# Patient Record
Sex: MALE | Race: WHITE | NOT HISPANIC OR LATINO | Employment: FULL TIME | URBAN - METROPOLITAN AREA
[De-identification: names, ages, dates, MRNs, and addresses within clinical notes are randomized per-mention and may not be internally consistent; named-entity substitution may affect disease eponyms.]

---

## 2021-05-13 ENCOUNTER — OFFICE VISIT (OUTPATIENT)
Dept: SURGERY | Facility: CLINIC | Age: 51
End: 2021-05-13

## 2021-05-13 VITALS — HEIGHT: 75 IN | BODY MASS INDEX: 39.17 KG/M2 | WEIGHT: 315 LBS

## 2021-05-13 DIAGNOSIS — N50.89 SCROTAL MASS: Primary | ICD-10-CM

## 2021-05-13 PROBLEM — I51.7 LEFT VENTRICULAR HYPERTROPHY: Status: ACTIVE | Noted: 2021-05-13

## 2021-05-13 PROBLEM — F41.1 GENERALIZED ANXIETY DISORDER: Status: ACTIVE | Noted: 2021-05-13

## 2021-05-13 PROBLEM — F32.0 MILD MAJOR DEPRESSION (HCC): Status: ACTIVE | Noted: 2021-05-13

## 2021-05-13 PROBLEM — R04.0 ANTERIOR EPISTAXIS: Status: ACTIVE | Noted: 2021-05-13

## 2021-05-13 PROBLEM — K21.9 GASTROESOPHAGEAL REFLUX DISEASE WITHOUT ESOPHAGITIS: Status: ACTIVE | Noted: 2021-05-13

## 2021-05-13 PROBLEM — I51.7 CARDIOMEGALY: Status: ACTIVE | Noted: 2021-05-13

## 2021-05-13 PROBLEM — R05.3 CHRONIC COUGH: Status: ACTIVE | Noted: 2021-05-13

## 2021-05-13 PROBLEM — J30.2 SEASONAL ALLERGIC RHINITIS: Status: ACTIVE | Noted: 2018-05-29

## 2021-05-13 PROBLEM — R73.01 IMPAIRED FASTING GLUCOSE: Status: ACTIVE | Noted: 2018-05-29

## 2021-05-13 PROCEDURE — 99204 OFFICE O/P NEW MOD 45 MIN: CPT | Performed by: PHYSICIAN ASSISTANT

## 2021-05-13 RX ORDER — OMEPRAZOLE 40 MG/1
40 CAPSULE, DELAYED RELEASE ORAL NIGHTLY
COMMUNITY

## 2021-05-13 RX ORDER — TELMISARTAN 40 MG/1
40 TABLET ORAL NIGHTLY
COMMUNITY

## 2021-05-13 NOTE — PROGRESS NOTES
"CC:    Right inguinal hernia    HPI:    This is a 50-year-old gentleman presenting to the office today at the request of Dr. Bigg Hassan for consultation.  He states that for going on approximately 20 years now he has had an enlarging area in his scrotum on the right side.  Over the last several months however this is begun to enlarge significantly extending from his right groin down into his right scrotum.  He occasionally has pain in his right groin and testicle.  He states that the bulge never reduces or goes away.  He does state that previously he believes he had an ultrasound potentially as far back as 2000 for which he was informed was showing a hydrocele.  More recently he states he underwent [facility] at [facility] and was told that he had a right inguinal hernia.  He denies having difficulty with urination, difficulty with bowel habits, abdominal distention, nausea, vomiting or any other complaints.    PMH:    Seasonal allergies  Cardiomegaly  Essential hypertension  Left ventricular hypertrophy  Anterior epistaxis  Impaired fasting glucose  GERD  Generalized anxiety disorder  Mild major depression  Chronic cough    PSH:     Colonoscopy 2020  Left toe amputation 2003    SH:  Does not smoke, does not consume alcohol    FMH:  No colorectal cancer in his family history    ALLERGIES:   Penicillin    MEDICATIONS:  Reviewed and reconciled in Epic.    ROS:    All other systems reviewed and negative other than presenting complaints.    PE:  Vitals: Weight 343 height 75\" BMI 43 Discussed with patient increased perioperative risks associated with obesity including increased risks of DVT, infection, seromas, poor wound healing and hernias (with abdominal surgery).  Constitutional: Well-nourished, well-developed male in no acute distress  HEENT: Normocephalic, atraumatic, sclera anicteric, normal conjunctiva  Pulmonary: Clear to auscultation bilaterally, normal respiratory effort, no wheezes, rales or rhonchi " noted  Cardiac: Regular rate and rhythm, no murmurs, gallops or rubs noted  Abdomen: Rotund, soft, nontender, nondistended, normal bowel sounds are present  : Large right-sided scrotal mass, unable to reduce, unable to ascertain whether or not this is hernia or hydrocele on exam  Skin: Warm, dry, intact, no rashes noted  Musculoskeletal: Normal gait, no joint asymmetries noted  Psych: Alert and orient x3, normal affect, normal judgment    CLINICAL SUMMARY (A/P):    This is a 50-year-old gentleman presenting with a longstanding history of a large right-sided scrotal mass.  Dr. Mueller and I examined him and were unable to ascertain whether or not this was indeed a large right inguinal hernia or hydrocele.  We called [facility] to obtain the ultrasound report.  We did decide to proceed with a CT scan of the pelvis to fully ascertain whether this was indeed an inguinal hernia.  In the event that it does return as an inguinal hernia I did discuss with him surgical intervention with that being an inguinal hernia repair.  I discussed with him the possibility of attempting this laparoscopically versus open and he understands the nature of the procedure and the risks including but not limited to bleeding, infection, use of mesh, and recurrence.  All questions were answered at the time of this visit and they were willing to proceed with all recommendations.  Once we have the results of the CT scan we will contact him to discuss the next step.      Linwood Mcgill PA-C

## 2021-06-07 ENCOUNTER — APPOINTMENT (OUTPATIENT)
Dept: CT IMAGING | Facility: HOSPITAL | Age: 51
End: 2021-06-07

## 2021-07-16 ENCOUNTER — TELEPHONE (OUTPATIENT)
Dept: SURGERY | Facility: CLINIC | Age: 51
End: 2021-07-16

## 2021-07-16 ENCOUNTER — PREP FOR SURGERY (OUTPATIENT)
Dept: OTHER | Facility: HOSPITAL | Age: 51
End: 2021-07-16

## 2021-07-16 DIAGNOSIS — K40.90 RIGHT INGUINAL HERNIA: Primary | ICD-10-CM

## 2021-07-16 RX ORDER — CEFAZOLIN SODIUM 2 G/100ML
2 INJECTION, SOLUTION INTRAVENOUS ONCE
Status: CANCELLED | OUTPATIENT
Start: 2022-01-25 | End: 2021-07-16

## 2021-07-16 NOTE — TELEPHONE ENCOUNTER
----- Message from Anton Mueller MD sent at 7/16/2021  8:54 AM EDT -----  Please let him know that his CT shows large right inguinal hernia. I have placed orders for open right inguinal hernia repair to be done in main OR

## 2022-01-04 PROBLEM — K40.90 RIGHT INGUINAL HERNIA: Status: ACTIVE | Noted: 2022-01-04

## 2022-01-17 ENCOUNTER — PRE-ADMISSION TESTING (OUTPATIENT)
Dept: PREADMISSION TESTING | Facility: HOSPITAL | Age: 52
End: 2022-01-17

## 2022-01-17 ENCOUNTER — TELEPHONE (OUTPATIENT)
Dept: SURGERY | Facility: CLINIC | Age: 52
End: 2022-01-17

## 2022-01-17 VITALS
SYSTOLIC BLOOD PRESSURE: 137 MMHG | WEIGHT: 315 LBS | HEIGHT: 75 IN | RESPIRATION RATE: 18 BRPM | HEART RATE: 97 BPM | DIASTOLIC BLOOD PRESSURE: 83 MMHG | TEMPERATURE: 97.9 F | BODY MASS INDEX: 39.17 KG/M2 | OXYGEN SATURATION: 95 %

## 2022-01-17 LAB
ANION GAP SERPL CALCULATED.3IONS-SCNC: 11.8 MMOL/L (ref 5–15)
BUN SERPL-MCNC: 12 MG/DL (ref 6–20)
BUN/CREAT SERPL: 17.1 (ref 7–25)
CALCIUM SPEC-SCNC: 9.3 MG/DL (ref 8.6–10.5)
CHLORIDE SERPL-SCNC: 98 MMOL/L (ref 98–107)
CO2 SERPL-SCNC: 26.2 MMOL/L (ref 22–29)
CREAT SERPL-MCNC: 0.7 MG/DL (ref 0.76–1.27)
DEPRECATED RDW RBC AUTO: 39.8 FL (ref 37–54)
ERYTHROCYTE [DISTWIDTH] IN BLOOD BY AUTOMATED COUNT: 13.4 % (ref 12.3–15.4)
GFR SERPL CREATININE-BSD FRML MDRD: 118 ML/MIN/1.73
GLUCOSE SERPL-MCNC: 343 MG/DL (ref 65–99)
HCT VFR BLD AUTO: 45.1 % (ref 37.5–51)
HGB BLD-MCNC: 14.8 G/DL (ref 13–17.7)
MCH RBC QN AUTO: 27.3 PG (ref 26.6–33)
MCHC RBC AUTO-ENTMCNC: 32.8 G/DL (ref 31.5–35.7)
MCV RBC AUTO: 83.1 FL (ref 79–97)
PLATELET # BLD AUTO: 307 10*3/MM3 (ref 140–450)
PMV BLD AUTO: 10.2 FL (ref 6–12)
POTASSIUM SERPL-SCNC: 4.3 MMOL/L (ref 3.5–5.2)
QT INTERVAL: 340 MS
RBC # BLD AUTO: 5.43 10*6/MM3 (ref 4.14–5.8)
SODIUM SERPL-SCNC: 136 MMOL/L (ref 136–145)
WBC NRBC COR # BLD: 8.44 10*3/MM3 (ref 3.4–10.8)

## 2022-01-17 PROCEDURE — 36415 COLL VENOUS BLD VENIPUNCTURE: CPT

## 2022-01-17 PROCEDURE — 80048 BASIC METABOLIC PNL TOTAL CA: CPT

## 2022-01-17 PROCEDURE — 93005 ELECTROCARDIOGRAM TRACING: CPT

## 2022-01-17 PROCEDURE — 85027 COMPLETE CBC AUTOMATED: CPT

## 2022-01-17 PROCEDURE — 93010 ELECTROCARDIOGRAM REPORT: CPT | Performed by: INTERNAL MEDICINE

## 2022-01-17 RX ORDER — CHLORHEXIDINE GLUCONATE 500 MG/1
1 CLOTH TOPICAL TAKE AS DIRECTED
COMMUNITY
End: 2022-02-15 | Stop reason: HOSPADM

## 2022-01-17 NOTE — DISCHARGE INSTRUCTIONS
ARRIVE DAY OF SURGERY AT  7:30 AM          Take the following medications the morning of surgery:  NONE      If you are on prescription narcotic pain medication to control your pain you may also take that medication the morning of surgery.    General Instructions:  • Do not eat solid food after midnight the night before surgery.  • You may drink clear liquids day of surgery but must stop at least one hour before your hospital arrival time.  • It is beneficial for you to have a clear drink that contains carbohydrates the day of surgery.  We suggest a 12 to 20 ounce bottle of Gatorade or Powerade for non-diabetic patients or a 12 to 20 ounce bottle of G2 or Powerade Zero for diabetic patients. (Pediatric patients, are not advised to drink a 12 to 20 ounce carbohydrate drink)    Clear liquids are liquids you can see through.  Nothing red in color.     Plain water                               Sports drinks  Sodas                                   Gelatin (Jell-O)  Fruit juices without pulp such as white grape juice and apple juice  Popsicles that contain no fruit or yogurt  Tea or coffee (no cream or milk added)  Gatorade / Powerade  G2 / Powerade Zero    • Infants may have breast milk up to four hours before surgery.  • Infants drinking formula may drink formula up to six hours before surgery.   • Patients who avoid smoking, chewing tobacco and alcohol for 4 weeks prior to surgery have a reduced risk of post-operative complications.  Quit smoking as many days before surgery as you can.  • Do not smoke, use chewing tobacco or drink alcohol the day of surgery.   • If applicable bring your C-PAP/ BI-PAP machine.  • Bring any papers given to you in the doctor’s office.  • Wear clean comfortable clothes.  • Do not wear contact lenses, false eyelashes or make-up.  Bring a case for your glasses.   • Bring crutches or walker if applicable.  • Remove all piercings.  Leave jewelry and any other valuables at home.  • Hair  extensions with metal clips must be removed prior to surgery.  • The Pre-Admission Testing nurse will instruct you to bring medications if unable to obtain an accurate list in Pre-Admission Testing.            Preventing a Surgical Site Infection:  • For 2 to 3 days before surgery, avoid shaving with a razor because the razor can irritate skin and make it easier to develop an infection.    • Any areas of open skin can increase the risk of a post-operative wound infection by allowing bacteria to enter and travel throughout the body.  Notify your surgeon if you have any skin wounds / rashes even if it is not near the expected surgical site.  The area will need assessed to determine if surgery should be delayed until it is healed.  • The night prior to surgery shower using a fresh bar of anti-bacterial soap (such as Dial) and clean washcloth.  Sleep in a clean bed with clean clothing.  Do not allow pets to sleep with you.  • Shower on the morning of surgery using a fresh bar of anti-bacterial soap (such as Dial) and clean washcloth.  Dry with a clean towel and dress in clean clothing.  • Ask your surgeon if you will be receiving antibiotics prior to surgery.  • Make sure you, your family, and all healthcare providers clean their hands with soap and water or an alcohol based hand  before caring for you or your wound.    Day of surgery:  Your arrival time is approximately two hours before your scheduled surgery time.  Upon arrival, a Pre-op nurse and Anesthesiologist will review your health history, obtain vital signs, and answer questions you may have.  The only belongings needed at this time will be a list of your home medications and if applicable your C-PAP/BI-PAP machine.  A Pre-op nurse will start an IV and you may receive medication in preparation for surgery, including something to help you relax.     Please be aware that surgery does come with discomfort.  We want to make every effort to control your  discomfort so please discuss any uncontrolled symptoms with your nurse.   Your doctor will most likely have prescribed pain medications.      If you are going home after surgery you will receive individualized written care instructions before being discharged.  A responsible adult must drive you to and from the hospital on the day of your surgery and stay with you for 24 hours.  Discharge prescriptions can be filled by the hospital pharmacy during regular pharmacy hours.  If you are having surgery late in the day/evening your prescription may be e-prescribed to your pharmacy.  Please verify your pharmacy hours or chose a 24 hour pharmacy to avoid not having access to your prescription because your pharmacy has closed for the day.    If you are staying overnight following surgery, you will be transported to your hospital room following the recovery period.  [facility] has all private rooms.    If you have any questions please call Pre-Admission Testing at [phone].  Deductibles and co-payments are collected on the day of service. Please be prepared to pay the required co-pay, deductible or deposit on the day of service as defined by your plan.    Patient Education for Self-Quarantine Process    • Following your COVID testing, we strongly recommend that you wear a mask when you are with other people and practice social distancing.   • Limit your activities to only required outings.  • Wash your hands with soap and water frequently for at least 20 seconds.   • Avoid touching your eyes, nose and mouth with unwashed hands.  • Do not share anything - utensils, drinking glasses, food from the same bowl.   • Sanitize household surfaces daily. Include all high touch areas (door handles, light switches, phones, countertops, etc.)    Call your surgeon immediately if you experience any of the following symptoms:  • Sore Throat  • Shortness of Breath or difficulty breathing  • Cough  • Chills  • Body soreness  or muscle pain  • Headache  • Fever  • New loss of taste or smell  • Do not arrive for your surgery ill.  Your procedure will need to be rescheduled to another time.  You will need to call your physician before the day of surgery to avoid any unnecessary exposure to hospital staff as well as other patients.    CHLORHEXIDINE CLOTH INSTRUCTIONS  The morning of surgery follow these instructions using the Chlorhexidine cloths you've been given.  These steps reduce bacteria on the body.  Do not use the cloths near your eyes, ears mouth, genitalia or on open wounds.  Throw the cloths away after use but do not try to flush them down a toilet.      • Open and remove one cloth at a time from the package.    • Leave the cloth unfolded and begin the bathing.  • Massage the skin with the cloths using gentle pressure to remove bacteria.  Do not scrub harshly.   • Follow the steps below with one 2% CHG cloth per area (6 total cloths).  • One cloth for neck, shoulders and chest.  • One cloth for both arms, hands, fingers and underarms (do underarms last).  • One cloth for the abdomen followed by groin.  • One cloth for right leg and foot including between the toes.  • One cloth for left leg and foot including between the toes.  • The last cloth is to be used for the back of the neck, back and buttocks.    Allow the CHG to air dry 3 minutes on the skin which will give it time to work and decrease the chance of irritation.  The skin may feel sticky until it is dry.  Do not rinse with water or any other liquid or you will lose the beneficial effects of the CHG.  If mild skin irritation occurs, do rinse the skin to remove the CHG.  Report this to the nurse at time of admission.  Do not apply lotions, creams, ointments, deodorants or perfumes after using the clothes. Dress in clean clothes before coming to the hospital.

## 2022-01-17 NOTE — TELEPHONE ENCOUNTER
"Called pt to inform him of his blood sugar and what steps we needed to take to make sure he was okay for surgery. He was upset.\" He does not know when he can get into doctor and if we are going be like this he wants to cancel the whole thing\" I did confirm with him twice that he wanted to cancel his surgery.  "

## 2022-02-12 ENCOUNTER — LAB (OUTPATIENT)
Dept: LAB | Facility: HOSPITAL | Age: 52
End: 2022-02-12

## 2022-02-12 LAB — SARS-COV-2 ORF1AB RESP QL NAA+PROBE: NOT DETECTED

## 2022-02-12 PROCEDURE — C9803 HOPD COVID-19 SPEC COLLECT: HCPCS

## 2022-02-12 PROCEDURE — U0004 COV-19 TEST NON-CDC HGH THRU: HCPCS

## 2022-02-15 ENCOUNTER — ANESTHESIA (OUTPATIENT)
Dept: PERIOP | Facility: HOSPITAL | Age: 52
End: 2022-02-15

## 2022-02-15 ENCOUNTER — HOSPITAL ENCOUNTER (OUTPATIENT)
Facility: HOSPITAL | Age: 52
Setting detail: HOSPITAL OUTPATIENT SURGERY
Discharge: HOME | End: 2022-02-15
Attending: SURGERY | Admitting: SURGERY

## 2022-02-15 ENCOUNTER — ANESTHESIA EVENT (OUTPATIENT)
Dept: PERIOP | Facility: HOSPITAL | Age: 52
End: 2022-02-15

## 2022-02-15 VITALS
HEIGHT: 75 IN | OXYGEN SATURATION: 95 % | DIASTOLIC BLOOD PRESSURE: 77 MMHG | RESPIRATION RATE: 16 BRPM | SYSTOLIC BLOOD PRESSURE: 112 MMHG | HEART RATE: 89 BPM | WEIGHT: 315 LBS | TEMPERATURE: 98.1 F | BODY MASS INDEX: 39.17 KG/M2

## 2022-02-15 DIAGNOSIS — K40.90 RIGHT INGUINAL HERNIA: ICD-10-CM

## 2022-02-15 LAB
GLUCOSE BLDC GLUCOMTR-MCNC: 154 MG/DL (ref 70–130)
GLUCOSE BLDC GLUCOMTR-MCNC: 160 MG/DL (ref 70–130)

## 2022-02-15 PROCEDURE — 25010000002 ONDANSETRON PER 1 MG: Performed by: NURSE ANESTHETIST, CERTIFIED REGISTERED

## 2022-02-15 PROCEDURE — 25010000002 SUCCINYLCHOLINE PER 20 MG: Performed by: NURSE ANESTHETIST, CERTIFIED REGISTERED

## 2022-02-15 PROCEDURE — C1781 MESH (IMPLANTABLE): HCPCS | Performed by: SURGERY

## 2022-02-15 PROCEDURE — 49507 PRP I/HERN INIT BLOCK >5 YR: CPT | Performed by: SURGERY

## 2022-02-15 PROCEDURE — C1889 IMPLANT/INSERT DEVICE, NOC: HCPCS | Performed by: SURGERY

## 2022-02-15 PROCEDURE — 25010000002 HYDROMORPHONE PER 4 MG: Performed by: NURSE ANESTHETIST, CERTIFIED REGISTERED

## 2022-02-15 PROCEDURE — S0260 H&P FOR SURGERY: HCPCS | Performed by: SURGERY

## 2022-02-15 PROCEDURE — 25010000002 CEFAZOLIN PER 500 MG: Performed by: SURGERY

## 2022-02-15 PROCEDURE — 25010000002 NEOSTIGMINE 5 MG/10ML SOLUTION: Performed by: NURSE ANESTHETIST, CERTIFIED REGISTERED

## 2022-02-15 PROCEDURE — 25010000002 FENTANYL CITRATE (PF) 50 MCG/ML SOLUTION: Performed by: NURSE ANESTHETIST, CERTIFIED REGISTERED

## 2022-02-15 PROCEDURE — 25010000002 DEXAMETHASONE PER 1 MG: Performed by: NURSE ANESTHETIST, CERTIFIED REGISTERED

## 2022-02-15 PROCEDURE — 82962 GLUCOSE BLOOD TEST: CPT

## 2022-02-15 PROCEDURE — 49507 PRP I/HERN INIT BLOCK >5 YR: CPT | Performed by: SPECIALIST/TECHNOLOGIST, OTHER

## 2022-02-15 PROCEDURE — 25010000002 PROPOFOL 10 MG/ML EMULSION: Performed by: NURSE ANESTHETIST, CERTIFIED REGISTERED

## 2022-02-15 PROCEDURE — 25010000002 KETOROLAC TROMETHAMINE PER 15 MG: Performed by: NURSE ANESTHETIST, CERTIFIED REGISTERED

## 2022-02-15 DEVICE — BARD MESH
Type: IMPLANTABLE DEVICE | Site: GROIN | Status: FUNCTIONAL
Brand: BARD MESH

## 2022-02-15 DEVICE — HORIZON TI ML 6 CLIPS/CART
Type: IMPLANTABLE DEVICE | Site: GROIN | Status: FUNCTIONAL
Brand: WECK

## 2022-02-15 RX ORDER — CEFAZOLIN SODIUM 2 G/100ML
2 INJECTION, SOLUTION INTRAVENOUS ONCE
Status: DISCONTINUED | OUTPATIENT
Start: 2022-02-15 | End: 2022-02-15

## 2022-02-15 RX ORDER — HYDROCODONE BITARTRATE AND ACETAMINOPHEN 7.5; 325 MG/1; MG/1
1 TABLET ORAL ONCE AS NEEDED
Status: COMPLETED | OUTPATIENT
Start: 2022-02-15 | End: 2022-02-15

## 2022-02-15 RX ORDER — SODIUM CHLORIDE 0.9 % (FLUSH) 0.9 %
10 SYRINGE (ML) INJECTION AS NEEDED
Status: DISCONTINUED | OUTPATIENT
Start: 2022-02-15 | End: 2022-02-15 | Stop reason: HOSPADM

## 2022-02-15 RX ORDER — KETOROLAC TROMETHAMINE 30 MG/ML
INJECTION, SOLUTION INTRAMUSCULAR; INTRAVENOUS AS NEEDED
Status: DISCONTINUED | OUTPATIENT
Start: 2022-02-15 | End: 2022-02-15 | Stop reason: SURG

## 2022-02-15 RX ORDER — DIPHENHYDRAMINE HCL 25 MG
25 CAPSULE ORAL
Status: DISCONTINUED | OUTPATIENT
Start: 2022-02-15 | End: 2022-02-15 | Stop reason: HOSPADM

## 2022-02-15 RX ORDER — FENTANYL CITRATE 50 UG/ML
INJECTION, SOLUTION INTRAMUSCULAR; INTRAVENOUS AS NEEDED
Status: DISCONTINUED | OUTPATIENT
Start: 2022-02-15 | End: 2022-02-15 | Stop reason: SURG

## 2022-02-15 RX ORDER — OXYCODONE AND ACETAMINOPHEN 7.5; 325 MG/1; MG/1
1 TABLET ORAL EVERY 4 HOURS PRN
Status: DISCONTINUED | OUTPATIENT
Start: 2022-02-15 | End: 2022-02-15 | Stop reason: HOSPADM

## 2022-02-15 RX ORDER — BUPIVACAINE HYDROCHLORIDE AND EPINEPHRINE 5; 5 MG/ML; UG/ML
INJECTION, SOLUTION EPIDURAL; INTRACAUDAL; PERINEURAL AS NEEDED
Status: DISCONTINUED | OUTPATIENT
Start: 2022-02-15 | End: 2022-02-15 | Stop reason: HOSPADM

## 2022-02-15 RX ORDER — SODIUM CHLORIDE 0.9 % (FLUSH) 0.9 %
10 SYRINGE (ML) INJECTION EVERY 12 HOURS SCHEDULED
Status: DISCONTINUED | OUTPATIENT
Start: 2022-02-15 | End: 2022-02-15 | Stop reason: HOSPADM

## 2022-02-15 RX ORDER — FLUMAZENIL 0.1 MG/ML
0.2 INJECTION INTRAVENOUS AS NEEDED
Status: DISCONTINUED | OUTPATIENT
Start: 2022-02-15 | End: 2022-02-15 | Stop reason: HOSPADM

## 2022-02-15 RX ORDER — NEOSTIGMINE METHYLSULFATE 0.5 MG/ML
INJECTION, SOLUTION INTRAVENOUS AS NEEDED
Status: DISCONTINUED | OUTPATIENT
Start: 2022-02-15 | End: 2022-02-15 | Stop reason: SURG

## 2022-02-15 RX ORDER — LIDOCAINE HYDROCHLORIDE 20 MG/ML
INJECTION, SOLUTION INFILTRATION; PERINEURAL AS NEEDED
Status: DISCONTINUED | OUTPATIENT
Start: 2022-02-15 | End: 2022-02-15 | Stop reason: SURG

## 2022-02-15 RX ORDER — PROMETHAZINE HYDROCHLORIDE 25 MG/1
25 SUPPOSITORY RECTAL ONCE AS NEEDED
Status: DISCONTINUED | OUTPATIENT
Start: 2022-02-15 | End: 2022-02-15 | Stop reason: HOSPADM

## 2022-02-15 RX ORDER — HYDROCODONE BITARTRATE AND ACETAMINOPHEN 5; 325 MG/1; MG/1
2 TABLET ORAL EVERY 4 HOURS PRN
Qty: 24 TABLET | Refills: 0 | Status: SHIPPED | OUTPATIENT
Start: 2022-02-15 | End: 2022-03-07 | Stop reason: ALTCHOICE

## 2022-02-15 RX ORDER — SODIUM CHLORIDE, SODIUM LACTATE, POTASSIUM CHLORIDE, CALCIUM CHLORIDE 600; 310; 30; 20 MG/100ML; MG/100ML; MG/100ML; MG/100ML
9 INJECTION, SOLUTION INTRAVENOUS CONTINUOUS
Status: DISCONTINUED | OUTPATIENT
Start: 2022-02-15 | End: 2022-02-15 | Stop reason: HOSPADM

## 2022-02-15 RX ORDER — ONDANSETRON 2 MG/ML
INJECTION INTRAMUSCULAR; INTRAVENOUS AS NEEDED
Status: DISCONTINUED | OUTPATIENT
Start: 2022-02-15 | End: 2022-02-15 | Stop reason: SURG

## 2022-02-15 RX ORDER — MAGNESIUM HYDROXIDE 1200 MG/15ML
LIQUID ORAL AS NEEDED
Status: DISCONTINUED | OUTPATIENT
Start: 2022-02-15 | End: 2022-02-15 | Stop reason: HOSPADM

## 2022-02-15 RX ORDER — LABETALOL HYDROCHLORIDE 5 MG/ML
5 INJECTION, SOLUTION INTRAVENOUS
Status: DISCONTINUED | OUTPATIENT
Start: 2022-02-15 | End: 2022-02-15 | Stop reason: HOSPADM

## 2022-02-15 RX ORDER — FAMOTIDINE 10 MG/ML
20 INJECTION, SOLUTION INTRAVENOUS ONCE
Status: COMPLETED | OUTPATIENT
Start: 2022-02-15 | End: 2022-02-15

## 2022-02-15 RX ORDER — DEXAMETHASONE SODIUM PHOSPHATE 10 MG/ML
INJECTION INTRAMUSCULAR; INTRAVENOUS AS NEEDED
Status: DISCONTINUED | OUTPATIENT
Start: 2022-02-15 | End: 2022-02-15 | Stop reason: SURG

## 2022-02-15 RX ORDER — FENTANYL CITRATE 50 UG/ML
50 INJECTION, SOLUTION INTRAMUSCULAR; INTRAVENOUS
Status: DISCONTINUED | OUTPATIENT
Start: 2022-02-15 | End: 2022-02-15 | Stop reason: HOSPADM

## 2022-02-15 RX ORDER — PIOGLITAZONEHYDROCHLORIDE 45 MG/1
45 TABLET ORAL DAILY
Status: ON HOLD | COMMUNITY
End: 2022-02-15

## 2022-02-15 RX ORDER — DAPAGLIFLOZIN 10 MG/1
10 TABLET, FILM COATED ORAL DAILY
COMMUNITY

## 2022-02-15 RX ORDER — ROCURONIUM BROMIDE 10 MG/ML
INJECTION, SOLUTION INTRAVENOUS AS NEEDED
Status: DISCONTINUED | OUTPATIENT
Start: 2022-02-15 | End: 2022-02-15 | Stop reason: SURG

## 2022-02-15 RX ORDER — DIPHENHYDRAMINE HYDROCHLORIDE 50 MG/ML
12.5 INJECTION INTRAMUSCULAR; INTRAVENOUS
Status: DISCONTINUED | OUTPATIENT
Start: 2022-02-15 | End: 2022-02-15 | Stop reason: HOSPADM

## 2022-02-15 RX ORDER — SUCCINYLCHOLINE CHLORIDE 20 MG/ML
INJECTION INTRAMUSCULAR; INTRAVENOUS AS NEEDED
Status: DISCONTINUED | OUTPATIENT
Start: 2022-02-15 | End: 2022-02-15 | Stop reason: SURG

## 2022-02-15 RX ORDER — EPHEDRINE SULFATE 50 MG/ML
5 INJECTION, SOLUTION INTRAVENOUS ONCE AS NEEDED
Status: DISCONTINUED | OUTPATIENT
Start: 2022-02-15 | End: 2022-02-15 | Stop reason: HOSPADM

## 2022-02-15 RX ORDER — MIDAZOLAM HYDROCHLORIDE 1 MG/ML
1 INJECTION INTRAMUSCULAR; INTRAVENOUS
Status: DISCONTINUED | OUTPATIENT
Start: 2022-02-15 | End: 2022-02-15 | Stop reason: HOSPADM

## 2022-02-15 RX ORDER — ONDANSETRON 4 MG/1
4 TABLET, FILM COATED ORAL EVERY 6 HOURS PRN
Qty: 10 TABLET | Refills: 1 | Status: SHIPPED | OUTPATIENT
Start: 2022-02-15 | End: 2022-03-07

## 2022-02-15 RX ORDER — PROMETHAZINE HYDROCHLORIDE 25 MG/1
25 TABLET ORAL ONCE AS NEEDED
Status: DISCONTINUED | OUTPATIENT
Start: 2022-02-15 | End: 2022-02-15 | Stop reason: HOSPADM

## 2022-02-15 RX ORDER — ONDANSETRON 2 MG/ML
4 INJECTION INTRAMUSCULAR; INTRAVENOUS ONCE AS NEEDED
Status: COMPLETED | OUTPATIENT
Start: 2022-02-15 | End: 2022-02-15

## 2022-02-15 RX ORDER — GLYCOPYRROLATE 0.2 MG/ML
INJECTION INTRAMUSCULAR; INTRAVENOUS AS NEEDED
Status: DISCONTINUED | OUTPATIENT
Start: 2022-02-15 | End: 2022-02-15 | Stop reason: SURG

## 2022-02-15 RX ORDER — IBUPROFEN 600 MG/1
600 TABLET ORAL ONCE AS NEEDED
Status: DISCONTINUED | OUTPATIENT
Start: 2022-02-15 | End: 2022-02-15 | Stop reason: HOSPADM

## 2022-02-15 RX ORDER — HYDRALAZINE HYDROCHLORIDE 20 MG/ML
5 INJECTION INTRAMUSCULAR; INTRAVENOUS
Status: DISCONTINUED | OUTPATIENT
Start: 2022-02-15 | End: 2022-02-15 | Stop reason: HOSPADM

## 2022-02-15 RX ORDER — PROPOFOL 10 MG/ML
VIAL (ML) INTRAVENOUS AS NEEDED
Status: DISCONTINUED | OUTPATIENT
Start: 2022-02-15 | End: 2022-02-15 | Stop reason: SURG

## 2022-02-15 RX ORDER — HYDROMORPHONE HYDROCHLORIDE 1 MG/ML
0.5 INJECTION, SOLUTION INTRAMUSCULAR; INTRAVENOUS; SUBCUTANEOUS
Status: DISCONTINUED | OUTPATIENT
Start: 2022-02-15 | End: 2022-02-15 | Stop reason: HOSPADM

## 2022-02-15 RX ORDER — NALOXONE HCL 0.4 MG/ML
0.2 VIAL (ML) INJECTION AS NEEDED
Status: DISCONTINUED | OUTPATIENT
Start: 2022-02-15 | End: 2022-02-15 | Stop reason: HOSPADM

## 2022-02-15 RX ORDER — ORAL SEMAGLUTIDE 3 MG/1
3 TABLET ORAL DAILY
COMMUNITY

## 2022-02-15 RX ORDER — CEFAZOLIN SODIUM IN 0.9 % NACL 3 G/100 ML
3 INTRAVENOUS SOLUTION, PIGGYBACK (ML) INTRAVENOUS ONCE
Status: COMPLETED | OUTPATIENT
Start: 2022-02-15 | End: 2022-02-15

## 2022-02-15 RX ADMIN — FENTANYL CITRATE 100 MCG: 0.05 INJECTION, SOLUTION INTRAMUSCULAR; INTRAVENOUS at 09:54

## 2022-02-15 RX ADMIN — SODIUM CHLORIDE, POTASSIUM CHLORIDE, SODIUM LACTATE AND CALCIUM CHLORIDE: 600; 310; 30; 20 INJECTION, SOLUTION INTRAVENOUS at 11:47

## 2022-02-15 RX ADMIN — SUCCINYLCHOLINE CHLORIDE 200 MG: 20 INJECTION, SOLUTION INTRAMUSCULAR; INTRAVENOUS; PARENTERAL at 09:58

## 2022-02-15 RX ADMIN — FENTANYL CITRATE 50 MCG: 50 INJECTION, SOLUTION INTRAMUSCULAR; INTRAVENOUS at 12:10

## 2022-02-15 RX ADMIN — LIDOCAINE HYDROCHLORIDE 100 MG: 20 INJECTION, SOLUTION INFILTRATION; PERINEURAL at 09:57

## 2022-02-15 RX ADMIN — HYDROCODONE BITARTRATE AND ACETAMINOPHEN 1 TABLET: 7.5; 325 TABLET ORAL at 13:01

## 2022-02-15 RX ADMIN — DEXAMETHASONE SODIUM PHOSPHATE 8 MG: 10 INJECTION INTRAMUSCULAR; INTRAVENOUS at 10:04

## 2022-02-15 RX ADMIN — ONDANSETRON 4 MG: 2 INJECTION INTRAMUSCULAR; INTRAVENOUS at 13:01

## 2022-02-15 RX ADMIN — ROCURONIUM BROMIDE 30 MG: 50 INJECTION INTRAVENOUS at 10:04

## 2022-02-15 RX ADMIN — ROCURONIUM BROMIDE 10 MG: 50 INJECTION INTRAVENOUS at 10:37

## 2022-02-15 RX ADMIN — ROCURONIUM BROMIDE 10 MG: 50 INJECTION INTRAVENOUS at 09:57

## 2022-02-15 RX ADMIN — HYDROMORPHONE HYDROCHLORIDE 0.5 MG: 1 INJECTION, SOLUTION INTRAMUSCULAR; INTRAVENOUS; SUBCUTANEOUS at 11:56

## 2022-02-15 RX ADMIN — SODIUM CHLORIDE, POTASSIUM CHLORIDE, SODIUM LACTATE AND CALCIUM CHLORIDE 9 ML/HR: 600; 310; 30; 20 INJECTION, SOLUTION INTRAVENOUS at 08:25

## 2022-02-15 RX ADMIN — CEFAZOLIN SODIUM 3 G: 10 INJECTION, POWDER, FOR SOLUTION INTRAVENOUS at 09:38

## 2022-02-15 RX ADMIN — NEOSTIGMINE METHYLSULFATE 3 MG: 0.5 INJECTION INTRAVENOUS at 11:30

## 2022-02-15 RX ADMIN — FENTANYL CITRATE 50 MCG: 50 INJECTION, SOLUTION INTRAMUSCULAR; INTRAVENOUS at 11:50

## 2022-02-15 RX ADMIN — KETOROLAC TROMETHAMINE 30 MG: 30 INJECTION, SOLUTION INTRAMUSCULAR at 11:30

## 2022-02-15 RX ADMIN — PROPOFOL 250 MG: 10 INJECTION, EMULSION INTRAVENOUS at 09:57

## 2022-02-15 RX ADMIN — ONDANSETRON 4 MG: 2 INJECTION INTRAMUSCULAR; INTRAVENOUS at 11:30

## 2022-02-15 RX ADMIN — FAMOTIDINE 20 MG: 10 INJECTION INTRAVENOUS at 08:37

## 2022-02-15 RX ADMIN — GLYCOPYRROLATE 0.6 MG: 0.2 INJECTION INTRAMUSCULAR; INTRAVENOUS at 11:30

## 2022-02-15 NOTE — ANESTHESIA PREPROCEDURE EVALUATION
Anesthesia Evaluation     history of anesthetic complications (post op headache):               Airway   Mallampati: III  TM distance: >3 FB  Neck ROM: full  Large neck circumference  Dental - normal exam     Pulmonary    (+) sleep apnea (at risk , sleep study scheduled),   (-) shortness of breath, not a smoker  Cardiovascular     (+) hypertension,   (-) dysrhythmias, angina, hyperlipidemia    ROS comment: Cardiomegaly, LVH    Neuro/Psych  GI/Hepatic/Renal/Endo    (+) morbid obesity, GERD,  diabetes mellitus,   (-) liver disease, no renal disease    Musculoskeletal     Abdominal    Substance History      OB/GYN          Other                        Anesthesia Plan    ASA 3     general     intravenous induction     Anesthetic plan, all risks, benefits, and alternatives have been provided, discussed and informed consent has been obtained with: patient.        CODE STATUS:

## 2022-02-15 NOTE — ANESTHESIA PROCEDURE NOTES
Airway  Urgency: elective    Date/Time: 2/15/2022 9:59 AM  Airway not difficult    General Information and Staff    Patient location during procedure: OR  Anesthesiologist: Dawson Carlin MD  CRNA: Bam Marquez CRNA    Indications and Patient Condition  Indications for airway management: airway protection    Preoxygenated: yes  MILS maintained throughout  Mask difficulty assessment: 2 - vent by mask + OA or adjuvant +/- NMBA    Final Airway Details  Final airway type: endotracheal airway      Successful airway: ETT  Cuffed: yes   Successful intubation technique: direct laryngoscopy  Facilitating devices/methods: intubating stylet  Endotracheal tube insertion site: oral  Blade: Beltrán  Blade size: 2  ETT size (mm): 8.0  Cormack-Lehane Classification: grade I - full view of glottis  Placement verified by: chest auscultation and capnometry   Cuff volume (mL): 8  Measured from: lips  ETT/EBT  to lips (cm): 22  Number of attempts at approach: 1  Assessment: lips, teeth, and gum same as pre-op and atraumatic intubation

## 2022-02-15 NOTE — ANESTHESIA POSTPROCEDURE EVALUATION
Patient: Guevara Barreto    Procedure Summary     Date: 02/15/22 Room / Location: [facility] OR  / [facility] MAIN OR    Anesthesia Start: 0950 Anesthesia Stop: 1147    Procedure: OPEN RIGHT INGUINAL HERNIA REPAIR WITH MESH (Right Abdomen) Diagnosis:       Right inguinal hernia      (Right inguinal hernia [K40.90])    Surgeons: Anton Mueller MD Provider: Dawson Carlin MD    Anesthesia Type: general ASA Status: 3          Anesthesia Type: general    Vitals  Vitals Value Taken Time   /80 02/15/22 1156   Temp 36.7 °C (98.1 °F) 02/15/22 1144   Pulse 91 02/15/22 1207   Resp 16 02/15/22 1155   SpO2 98 % 02/15/22 1207   Vitals shown include unvalidated device data.        Post Anesthesia Care and Evaluation    Patient location during evaluation: PACU  Patient participation: complete - patient participated  Level of consciousness: awake and alert  Pain management: adequate  Airway patency: patent  Anesthetic complications: No anesthetic complications    Cardiovascular status: acceptable  Respiratory status: acceptable  Hydration status: acceptable    Comments: --------------------            02/15/22               1155     --------------------   BP:       134/80     Pulse:      80       Resp:       16       Temp:                SpO2:      96%      --------------------

## 2022-02-15 NOTE — H&P
HPI: 51-year-old gentleman with longstanding right inguinal hernia.    PMH, PSH, MEDS AND ALLERGIES reviewed and reconciled with EPIC    PHYSICAL EXAM:  -  Constitutional:  no acute distress  -  Respiratory:  normal inspiratory effort  -  Cardiovascular: regular rate  -  Gastrointestinal: Soft, Large right inguinal hernia    ASSESSMENT/PLAN:    Open right inguinal hernia repair.  He understands nature the procedure and the risks.    Anton Mueller M.D.

## 2022-02-15 NOTE — DISCHARGE INSTRUCTIONS
1.  May shower day after tomorrow over incision and drain, may remove dressings day after tomorrow  2.  My office will call him with follow-up appointment for this coming Monday.        Dr. Anton Mueller  [address]  [place], [place] [address] [phone]    Discharge Instructions for Hernia Surgery    1. Go home, rest and take it easy today; however, you should get up and move about several times today to reduce the risk of developing a clot in your legs.      2. You may experience some dizziness or memory loss from the anesthesia.  This may last for the next 24 hours.  Someone should plan on staying with you for the first 24 hours for your safety.    3. Do not make any important legal decisions or sign any legal papers for the next 24 hours.      4. Eat and drink lightly today.  Start off with liquids, jello, soup, crackers or other bland foods at first. You may advance your diet tomorrow as tolerated as long as you do not experience any nausea or vomiting.     5. If skin glue (Dermabond) was used, your incisions are protected and covered.  The invisible glue will dissolve on its own as your incision heals. If dressings were used, you may remove your outer dressings in 3 days.  The white tapes called steri-strips should stay in place.  They will fall off on their own in 1-2 weeks.  Do not worry if they come off sooner.      6. If dressings were used, you may notice some bleeding/drainage on your outer dressings. A little bloody drainage is normal. If the bleeding/drainage is such that the bandage cannot absorb it, remove the dressing, apply clean gauze and apply firm pressure for a full 15 minutes.  If the bleeding continues, please call me.    7. You may shower tomorrow allowing water to run over the incisions; however, do not scrub the incisions.  No tub baths until your incisions are completely healed.      8. No lifting > 20 lbs. until you are seen at your follow-up visit.         9. You have  received a prescription for a narcotic pain medicine, as you will have some pain following surgery.   You will not be totally pain free, but your pain medicine should make the pain tolerable.  Please take your pain medicine as prescribed and always take your pills with food to prevent nausea. If you are having severe pain that cannot be controlled by the pain medicine, please contact me.      10. You have also received a prescription for an anti-nausea medicine.  Please take this as prescribed for any nausea or vomiting.  Nausea could be a result of the anesthesia or a result of the narcotic pain medicine.  If you experience severe nausea and vomiting that cannot be controlled by the nausea medicine, please call me.      11. If you had a laparoscopic surgery, it is not unusual to experience pain/discomfort in your shoulders or under your ribs after surgery.  It is from the gas used during the laparoscopic procedure and usually lasts 1-3 days.  The prescription pain medicine is used to treat the surgical pain and does not typically alleviate this “gassy” pain.     12. No driving for 24 hours and for as long as you are taking your prescription pain medicine.    13. You will need to call the office at [phone] to schedule a follow-up appointment in 6-10 days.     14. Remember to contact me for any of the following:    • Fever > 101 degrees  • Severe pain that cannot be controlled by taking your pain pills  • Severe nausea or vomiting that cannot be controlled by taking your nausea pills  • Significant bleeding of your incisions  • Drainage that has a bad smell or is yellow or green in appearance  • Any other questions or concerns      Additional Instruction for Inguinal Hernia Patients Only    1. If you did not urinate at the hospital after your surgery or if you feel the need to urinate and cannot, this will necessitate a return to the Emergency Room for placement of a urinary catheter.  You should also notify me as  well.  As a rule, you should be able to empty your bladder within 4-6 hours after discharge from the hospital.      2. You may notice some scrotal bruising and/or swelling. A scrotal support or briefs as well as ice packs may be used to alleviate discomfort.

## 2022-02-15 NOTE — OP NOTE
PREOPERATIVE DIAGNOSIS:  Large incarcerated right inguinal hernia    POSTOPERATIVE DIAGNOSIS (FINDINGS):  Large incarcerated indirect right inguinal hernia    PROCEDURE:  Open incarcerated right inguinal hernia repair with polypropylene mesh (Per technique)    SURGEON:  Anton Mueller MD    ASSISTANT:  Emely Bhardwaj, was responsible for performing the following activities: suction, irrigation, suturing, closing, retraction, and placing dressing, and their skilled assistance was necessary for the success of this case.    ANESTHESIA:  General    EBL:  Minimal    SPECIMEN(S):  none    DESCRIPTION:  In supine position under general anesthetic prepped and draped usual sterile manner.  Half percent Marcaine with epinephrine infiltrated locally.  Transverse incision made and carried to a large incarcerated hernia sac.  The external oblique was  to the level of the internal ring and was divided laterally.  The hernia sac and cord structures were dissected circumferentially and Penrose drain was placed.  Cremasteric muscles were  from the underlying cord structures and ultimately dissected back past the external ring and divided with the electrocautery.  Spermatic cord was skeletonized and the vas deferens and vessels were  from the very large indirect sac that extended down to the scrotum.  Dissection was carried proximally and ultimately the hernia sac was completely reduced with some difficulty.  The transversalis fascia was closed over the sac with interrupted 0 Ethibond.  A generous medial relaxing incision was made and the conjoined aponeurosis was sutured to the transversalis fascia immediately adjacent to the shelving edge of Poupart's ligament with interrupted 0 Ethibond.  Appropriate defect was left for the cord.  6 x 6 polypropylene mesh was then placed on the inguinal floor and secured medially close to the pubic tubercle with interrupted 0 Ethibond and then secured to the  shelving edge of Poupart's ligament with interrupted 0 Ethibond.  A slit was cut for the cord structures and the inferior aspect of the superior portion of the mesh was secured to the shelving edge of Poupart's ligament lateral to the cord structures to finish the neointernal ring.  Medially and superiorly the mesh was secured to the fascia with interrupted 0 Ethibond.  Good flat apposition of the mesh was achieved and good hemostasis was noted.  The portion of the external oblique which was intact preoperatively was sutured with interrupted 3-0 Vicryl.  19 Maori Syed drain was placed well into the scrotum and then anterior to the cord structures.  Skin was closed with interrupted 3-0 nylon vertical mattress sutures.  Sterile dressing applied.  Tolerated well.    Anton Mueller M.D.

## 2022-02-18 ENCOUNTER — TELEPHONE (OUTPATIENT)
Dept: SURGERY | Facility: CLINIC | Age: 52
End: 2022-02-18

## 2022-02-18 NOTE — TELEPHONE ENCOUNTER
S/P OPEN RIGHT INGUINAL HERNIA REPAIR WITH MESH on 2/15/2022    Mr. Barreto called this morning stating that he had been constipated took a stool softener, had a bowel movement and then later last night had a coughing spell and now his right testicle is swollen (see below picture). He confirmed that he has not had an increase in pain and his INDER drain is still in tact. The fluid has changed color from normal color red to light red/pinkish now. He has only emptied his drain once yesterday evening and it was 20 cc's.         Does patient just need to elevate and apply ice?

## 2022-02-18 NOTE — TELEPHONE ENCOUNTER
I spoke with Mr. Barreto and informed him that per Dr. Mueller his swelling does not look like a problem. He has advised that the patient can apply ice to help minimize this and I also advised him to elevate his scrotum by rolling up a hand towel and placing it beneath them. Patient voiced understanding and his post-op appointment was scheduled for Monday 2/21 @ 1:00 pm

## 2022-02-21 ENCOUNTER — OFFICE VISIT (OUTPATIENT)
Dept: SURGERY | Facility: CLINIC | Age: 52
End: 2022-02-21

## 2022-02-21 VITALS — WEIGHT: 315 LBS | HEIGHT: 75 IN | BODY MASS INDEX: 39.17 KG/M2

## 2022-02-21 DIAGNOSIS — Z09 FOLLOW UP: Primary | ICD-10-CM

## 2022-02-21 PROCEDURE — 99024 POSTOP FOLLOW-UP VISIT: CPT | Performed by: SURGERY

## 2022-02-21 NOTE — PROGRESS NOTES
Postoperative visit    Open incarcerated right inguinal hernia repair with polypropylene mesh (Per technique) 2/15/2022    Office visit: His incision is healing well and his drain is putting out minimal serosanguineous fluid and was removed.  He has significant but not unexpected right hemiscrotal swelling.  I recommended that he start wearing better scrotal support.  He is to return in 1 week to remove sutures.

## 2022-02-24 ENCOUNTER — TELEPHONE (OUTPATIENT)
Dept: SURGERY | Facility: CLINIC | Age: 52
End: 2022-02-24

## 2022-02-24 NOTE — TELEPHONE ENCOUNTER
Caller: DARIA WAGNER    Relationship to patient: SELF     Best call back number: [phone]    Patient is needing: PT WAS RETURNING [facility] CALL. PLEASE CALL HIM BACK.

## 2022-02-24 NOTE — TELEPHONE ENCOUNTER
I spoke with Mr. Barreto and he states that he is weak and yesterday evening after eating a salad he began to have vomiting and running a fever from 100.0 to around 101.9  He confirms that he still has drainage where his drain was removed on Monday 2/21. He states the color is reddish and has an odor.  He took an at home Covid test and it was negative.  The first thing he has had in the last 24 hr is an apple and that seems to be staying down. He states he is drinking plenty of fluids also.  I advised the patient that he can take Zofran that was prescribed post-operatively and to drink either Gatorade or Powered as he may be dehydrated and the electrolytes will help with supplementation. I also advised him to continue on a bland diet such as dry toast, beef broth, chicken broth, crackers etc to see if this will stay down also. I advised the patient that if his symptoms worsened to either proceed to the ER or call the office and the answering service will page the on call provider for advice.  Please advise.

## 2022-02-24 NOTE — TELEPHONE ENCOUNTER
ATTEMPTED TO WARM TRANSFER BUT UNABLE TO REACH THE PRACTICE.     Caller: DARIA WAGNER    Relationship to patient: SELF    Best call back number: [phone]    Patient is needing: PT HAD OPEN RIGHT INGUINAL HERNIA REPAIR WITH MESH (RIGHT TESTICLE) ON 2/15/22.  PT HAD DRAIN REMOVED AT POST-OP APPT ON 2/21/22    SINCE THEN, PUS HAS BEEN DRAINING FROM PT'S RIGHT TESTICLE,  PT STARTED RUNNING A FEVER (100-102), CURRENTLY 101.8 AND IS EXTREMELY WEAK.  PT VOMITED LAST NIGHT AFTER A SALAD, AND AGAIN THIS MORNING (JUST BILE).    PT WANTED TO KNOW IF THIS IS TYPICAL OR A PROBLEM.    SINCE I COULDN'T GET ANYONE ON THE LINE TO ADVISE THE PT, I TOLD THE PT I WOULD PUT IN A NOTE TO HAVE SOMEONE CALL THEM BACK, BUT ADVISED THEM TO GO TO UC OR ER TO GET CHECKED OUT SINCE I DIDN'T KNOW WHEN SOMEONE WOULD BE ABLE TO CALL BACK.

## 2022-02-25 RX ORDER — CEPHALEXIN 500 MG/1
500 CAPSULE ORAL 3 TIMES DAILY
Qty: 21 CAPSULE | Refills: 0 | Status: SHIPPED | OUTPATIENT
Start: 2022-02-25 | End: 2022-03-04

## 2022-02-25 NOTE — TELEPHONE ENCOUNTER
I spoke with the patient & informed him that Keflex has been e-scribed to the [facility] @ [address]. Patient voiced understanding and will call back if he develops any additional issues or this does not help.

## 2022-02-25 NOTE — TELEPHONE ENCOUNTER
"Patient called and left message stating, \"I won't be able to take Amoxicillin.\" Please let him know if there is anything else that Dr. Mueller could prescribe. "

## 2022-02-25 NOTE — TELEPHONE ENCOUNTER
I left Mr. Barreto a voice mail to call me back to find out if he is able to Augmentin since he has an allergy to Penicillins. I left him my direct number to return my call.

## 2022-02-28 ENCOUNTER — HOSPITAL ENCOUNTER (INPATIENT)
Facility: HOSPITAL | Age: 52
LOS: 2 days | Discharge: HOME | End: 2022-03-02
Attending: EMERGENCY MEDICINE | Admitting: SURGERY

## 2022-02-28 DIAGNOSIS — T81.31XA POSTOPERATIVE WOUND DEHISCENCE, INITIAL ENCOUNTER: Primary | ICD-10-CM

## 2022-02-28 DIAGNOSIS — Z87.19 H/O INGUINAL HERNIA: ICD-10-CM

## 2022-02-28 DIAGNOSIS — T81.31XS POSTOPERATIVE WOUND DEHISCENCE, SEQUELA: ICD-10-CM

## 2022-02-28 DIAGNOSIS — L03.314 CELLULITIS OF RIGHT GROIN: ICD-10-CM

## 2022-02-28 DIAGNOSIS — E87.6 HYPOKALEMIA: ICD-10-CM

## 2022-02-28 LAB
ALBUMIN SERPL-MCNC: 2.7 G/DL (ref 3.5–5.2)
ALBUMIN/GLOB SERPL: 0.7 G/DL
ALP SERPL-CCNC: 81 U/L (ref 39–117)
ALT SERPL W P-5'-P-CCNC: 27 U/L (ref 1–41)
ANION GAP SERPL CALCULATED.3IONS-SCNC: 17 MMOL/L (ref 5–15)
APTT PPP: 31.2 SECONDS (ref 22.7–35.4)
AST SERPL-CCNC: 22 U/L (ref 1–40)
BASOPHILS # BLD AUTO: 0.07 10*3/MM3 (ref 0–0.2)
BASOPHILS NFR BLD AUTO: 0.5 % (ref 0–1.5)
BILIRUB SERPL-MCNC: 0.5 MG/DL (ref 0–1.2)
BUN SERPL-MCNC: 12 MG/DL (ref 6–20)
BUN/CREAT SERPL: 23.1 (ref 7–25)
CALCIUM SPEC-SCNC: 8.7 MG/DL (ref 8.6–10.5)
CHLORIDE SERPL-SCNC: 93 MMOL/L (ref 98–107)
CO2 SERPL-SCNC: 23 MMOL/L (ref 22–29)
CREAT SERPL-MCNC: 0.52 MG/DL (ref 0.76–1.27)
D-LACTATE SERPL-SCNC: 1.2 MMOL/L (ref 0.5–2)
DEPRECATED RDW RBC AUTO: 38.3 FL (ref 37–54)
EGFRCR SERPLBLD CKD-EPI 2021: 121.3 ML/MIN/1.73
EOSINOPHIL # BLD AUTO: 0.03 10*3/MM3 (ref 0–0.4)
EOSINOPHIL NFR BLD AUTO: 0.2 % (ref 0.3–6.2)
ERYTHROCYTE [DISTWIDTH] IN BLOOD BY AUTOMATED COUNT: 13.3 % (ref 12.3–15.4)
GLOBULIN UR ELPH-MCNC: 3.9 GM/DL
GLUCOSE SERPL-MCNC: 146 MG/DL (ref 65–99)
HCT VFR BLD AUTO: 38.3 % (ref 37.5–51)
HGB BLD-MCNC: 12.5 G/DL (ref 13–17.7)
IMM GRANULOCYTES # BLD AUTO: 0.2 10*3/MM3 (ref 0–0.05)
IMM GRANULOCYTES NFR BLD AUTO: 1.4 % (ref 0–0.5)
INR PPP: 1.18 (ref 0.9–1.1)
LYMPHOCYTES # BLD AUTO: 1.19 10*3/MM3 (ref 0.7–3.1)
LYMPHOCYTES NFR BLD AUTO: 8.3 % (ref 19.6–45.3)
MCH RBC QN AUTO: 26.7 PG (ref 26.6–33)
MCHC RBC AUTO-ENTMCNC: 32.6 G/DL (ref 31.5–35.7)
MCV RBC AUTO: 81.7 FL (ref 79–97)
MONOCYTES # BLD AUTO: 1.68 10*3/MM3 (ref 0.1–0.9)
MONOCYTES NFR BLD AUTO: 11.7 % (ref 5–12)
NEUTROPHILS NFR BLD AUTO: 11.22 10*3/MM3 (ref 1.7–7)
NEUTROPHILS NFR BLD AUTO: 77.9 % (ref 42.7–76)
NRBC BLD AUTO-RTO: 0 /100 WBC (ref 0–0.2)
PLATELET # BLD AUTO: 428 10*3/MM3 (ref 140–450)
PMV BLD AUTO: 9 FL (ref 6–12)
POTASSIUM SERPL-SCNC: 3.1 MMOL/L (ref 3.5–5.2)
PROT SERPL-MCNC: 6.6 G/DL (ref 6–8.5)
PROTHROMBIN TIME: 14.9 SECONDS (ref 11.7–14.2)
RBC # BLD AUTO: 4.69 10*6/MM3 (ref 4.14–5.8)
SARS-COV-2 RNA PNL SPEC NAA+PROBE: NOT DETECTED
SODIUM SERPL-SCNC: 133 MMOL/L (ref 136–145)
WBC NRBC COR # BLD: 14.39 10*3/MM3 (ref 3.4–10.8)

## 2022-02-28 PROCEDURE — 87186 SC STD MICRODIL/AGAR DIL: CPT | Performed by: EMERGENCY MEDICINE

## 2022-02-28 PROCEDURE — 87040 BLOOD CULTURE FOR BACTERIA: CPT | Performed by: EMERGENCY MEDICINE

## 2022-02-28 PROCEDURE — 80053 COMPREHEN METABOLIC PANEL: CPT | Performed by: EMERGENCY MEDICINE

## 2022-02-28 PROCEDURE — 85025 COMPLETE CBC W/AUTO DIFF WBC: CPT | Performed by: EMERGENCY MEDICINE

## 2022-02-28 PROCEDURE — 25010000002 VANCOMYCIN 10 G RECONSTITUTED SOLUTION: Performed by: EMERGENCY MEDICINE

## 2022-02-28 PROCEDURE — 25010000002 CEFTRIAXONE PER 250 MG: Performed by: EMERGENCY MEDICINE

## 2022-02-28 PROCEDURE — 85730 THROMBOPLASTIN TIME PARTIAL: CPT | Performed by: EMERGENCY MEDICINE

## 2022-02-28 PROCEDURE — 87205 SMEAR GRAM STAIN: CPT | Performed by: EMERGENCY MEDICINE

## 2022-02-28 PROCEDURE — 87070 CULTURE OTHR SPECIMN AEROBIC: CPT | Performed by: EMERGENCY MEDICINE

## 2022-02-28 PROCEDURE — 85610 PROTHROMBIN TIME: CPT | Performed by: EMERGENCY MEDICINE

## 2022-02-28 PROCEDURE — 36415 COLL VENOUS BLD VENIPUNCTURE: CPT

## 2022-02-28 PROCEDURE — 99284 EMERGENCY DEPT VISIT MOD MDM: CPT

## 2022-02-28 PROCEDURE — 83605 ASSAY OF LACTIC ACID: CPT | Performed by: EMERGENCY MEDICINE

## 2022-02-28 PROCEDURE — 87635 SARS-COV-2 COVID-19 AMP PRB: CPT | Performed by: EMERGENCY MEDICINE

## 2022-02-28 RX ORDER — HYDROCODONE BITARTRATE AND ACETAMINOPHEN 5; 325 MG/1; MG/1
1 TABLET ORAL EVERY 6 HOURS PRN
Status: DISCONTINUED | OUTPATIENT
Start: 2022-02-28 | End: 2022-03-02 | Stop reason: HOSPADM

## 2022-02-28 RX ORDER — ONDANSETRON 4 MG/1
4 TABLET, FILM COATED ORAL EVERY 6 HOURS PRN
Status: DISCONTINUED | OUTPATIENT
Start: 2022-02-28 | End: 2022-03-02 | Stop reason: HOSPADM

## 2022-02-28 RX ORDER — CEFAZOLIN SODIUM 2 G/100ML
2 INJECTION, SOLUTION INTRAVENOUS EVERY 8 HOURS
Status: DISCONTINUED | OUTPATIENT
Start: 2022-03-01 | End: 2022-03-01

## 2022-02-28 RX ORDER — LOSARTAN POTASSIUM 50 MG/1
50 TABLET ORAL
Status: DISCONTINUED | OUTPATIENT
Start: 2022-03-01 | End: 2022-03-02 | Stop reason: HOSPADM

## 2022-02-28 RX ORDER — NICOTINE POLACRILEX 4 MG
15 LOZENGE BUCCAL
Status: DISCONTINUED | OUTPATIENT
Start: 2022-02-28 | End: 2022-03-02 | Stop reason: HOSPADM

## 2022-02-28 RX ORDER — SODIUM CHLORIDE 0.9 % (FLUSH) 0.9 %
10 SYRINGE (ML) INJECTION AS NEEDED
Status: DISCONTINUED | OUTPATIENT
Start: 2022-02-28 | End: 2022-03-02 | Stop reason: HOSPADM

## 2022-02-28 RX ORDER — DEXTROSE MONOHYDRATE 25 G/50ML
25 INJECTION, SOLUTION INTRAVENOUS
Status: DISCONTINUED | OUTPATIENT
Start: 2022-02-28 | End: 2022-03-02 | Stop reason: HOSPADM

## 2022-02-28 RX ORDER — HYDROCODONE BITARTRATE AND ACETAMINOPHEN 5; 325 MG/1; MG/1
2 TABLET ORAL EVERY 4 HOURS PRN
Status: DISCONTINUED | OUTPATIENT
Start: 2022-02-28 | End: 2022-03-02 | Stop reason: HOSPADM

## 2022-02-28 RX ORDER — CHOLECALCIFEROL (VITAMIN D3) 125 MCG
5 CAPSULE ORAL NIGHTLY PRN
Status: DISCONTINUED | OUTPATIENT
Start: 2022-02-28 | End: 2022-03-02 | Stop reason: HOSPADM

## 2022-02-28 RX ORDER — SODIUM CHLORIDE 0.9 % (FLUSH) 0.9 %
10 SYRINGE (ML) INJECTION EVERY 12 HOURS SCHEDULED
Status: DISCONTINUED | OUTPATIENT
Start: 2022-02-28 | End: 2022-03-02 | Stop reason: HOSPADM

## 2022-02-28 RX ORDER — SODIUM CHLORIDE 9 MG/ML
125 INJECTION, SOLUTION INTRAVENOUS CONTINUOUS
Status: DISCONTINUED | OUTPATIENT
Start: 2022-02-28 | End: 2022-03-01

## 2022-02-28 RX ORDER — HYDROMORPHONE HYDROCHLORIDE 1 MG/ML
0.5 INJECTION, SOLUTION INTRAMUSCULAR; INTRAVENOUS; SUBCUTANEOUS
Status: DISCONTINUED | OUTPATIENT
Start: 2022-02-28 | End: 2022-03-02 | Stop reason: HOSPADM

## 2022-02-28 RX ORDER — INSULIN LISPRO 100 [IU]/ML
0-7 INJECTION, SOLUTION INTRAVENOUS; SUBCUTANEOUS
Status: DISCONTINUED | OUTPATIENT
Start: 2022-03-01 | End: 2022-03-02 | Stop reason: HOSPADM

## 2022-02-28 RX ORDER — PANTOPRAZOLE SODIUM 40 MG/1
40 TABLET, DELAYED RELEASE ORAL EVERY MORNING
Status: DISCONTINUED | OUTPATIENT
Start: 2022-03-01 | End: 2022-03-02 | Stop reason: HOSPADM

## 2022-02-28 RX ORDER — DEXTROSE AND SODIUM CHLORIDE 5; .45 G/100ML; G/100ML
100 INJECTION, SOLUTION INTRAVENOUS CONTINUOUS
Status: DISCONTINUED | OUTPATIENT
Start: 2022-02-28 | End: 2022-03-01

## 2022-02-28 RX ORDER — POTASSIUM CHLORIDE 750 MG/1
40 TABLET, FILM COATED, EXTENDED RELEASE ORAL ONCE
Status: COMPLETED | OUTPATIENT
Start: 2022-02-28 | End: 2022-02-28

## 2022-02-28 RX ADMIN — SODIUM CHLORIDE 500 ML: 9 INJECTION, SOLUTION INTRAVENOUS at 20:02

## 2022-02-28 RX ADMIN — POTASSIUM CHLORIDE 40 MEQ: 750 TABLET, EXTENDED RELEASE ORAL at 20:00

## 2022-02-28 RX ADMIN — HYDROCODONE BITARTRATE AND ACETAMINOPHEN 1 TABLET: 5; 325 TABLET ORAL at 23:00

## 2022-02-28 RX ADMIN — SODIUM CHLORIDE 125 ML/HR: 9 INJECTION, SOLUTION INTRAVENOUS at 20:02

## 2022-02-28 RX ADMIN — Medication 5 MG: at 23:00

## 2022-02-28 RX ADMIN — DEXTROSE AND SODIUM CHLORIDE 75 ML/HR: 5; 450 INJECTION, SOLUTION INTRAVENOUS at 23:00

## 2022-02-28 RX ADMIN — VANCOMYCIN HYDROCHLORIDE 3000 MG: 10 INJECTION, POWDER, LYOPHILIZED, FOR SOLUTION INTRAVENOUS at 23:00

## 2022-02-28 RX ADMIN — CEFTRIAXONE 2 G: 2 INJECTION, POWDER, FOR SOLUTION INTRAMUSCULAR; INTRAVENOUS at 20:01

## 2022-02-28 RX ADMIN — Medication 10 ML: at 23:00

## 2022-03-01 ENCOUNTER — ANESTHESIA EVENT (OUTPATIENT)
Dept: PERIOP | Facility: HOSPITAL | Age: 52
End: 2022-03-01

## 2022-03-01 ENCOUNTER — ANESTHESIA (OUTPATIENT)
Dept: PERIOP | Facility: HOSPITAL | Age: 52
End: 2022-03-01

## 2022-03-01 LAB
BASOPHILS # BLD AUTO: 0.08 10*3/MM3 (ref 0–0.2)
BASOPHILS NFR BLD AUTO: 0.7 % (ref 0–1.5)
DEPRECATED RDW RBC AUTO: 39.9 FL (ref 37–54)
EOSINOPHIL # BLD AUTO: 0.07 10*3/MM3 (ref 0–0.4)
EOSINOPHIL NFR BLD AUTO: 0.6 % (ref 0.3–6.2)
ERYTHROCYTE [DISTWIDTH] IN BLOOD BY AUTOMATED COUNT: 13.4 % (ref 12.3–15.4)
GLUCOSE BLDC GLUCOMTR-MCNC: 123 MG/DL (ref 70–130)
GLUCOSE BLDC GLUCOMTR-MCNC: 123 MG/DL (ref 70–130)
GLUCOSE BLDC GLUCOMTR-MCNC: 174 MG/DL (ref 70–130)
GLUCOSE BLDC GLUCOMTR-MCNC: 253 MG/DL (ref 70–130)
HCT VFR BLD AUTO: 36 % (ref 37.5–51)
HGB BLD-MCNC: 11.9 G/DL (ref 13–17.7)
IMM GRANULOCYTES # BLD AUTO: 0.24 10*3/MM3 (ref 0–0.05)
IMM GRANULOCYTES NFR BLD AUTO: 2.2 % (ref 0–0.5)
LYMPHOCYTES # BLD AUTO: 2.09 10*3/MM3 (ref 0.7–3.1)
LYMPHOCYTES NFR BLD AUTO: 19 % (ref 19.6–45.3)
MCH RBC QN AUTO: 27.1 PG (ref 26.6–33)
MCHC RBC AUTO-ENTMCNC: 33.1 G/DL (ref 31.5–35.7)
MCV RBC AUTO: 82 FL (ref 79–97)
MONOCYTES # BLD AUTO: 1.33 10*3/MM3 (ref 0.1–0.9)
MONOCYTES NFR BLD AUTO: 12.1 % (ref 5–12)
NEUTROPHILS NFR BLD AUTO: 65.4 % (ref 42.7–76)
NEUTROPHILS NFR BLD AUTO: 7.21 10*3/MM3 (ref 1.7–7)
NRBC BLD AUTO-RTO: 0 /100 WBC (ref 0–0.2)
PLATELET # BLD AUTO: 411 10*3/MM3 (ref 140–450)
PMV BLD AUTO: 9.1 FL (ref 6–12)
RBC # BLD AUTO: 4.39 10*6/MM3 (ref 4.14–5.8)
WBC NRBC COR # BLD: 11.02 10*3/MM3 (ref 3.4–10.8)

## 2022-03-01 PROCEDURE — 25010000002 HYDROMORPHONE PER 4 MG: Performed by: STUDENT IN AN ORGANIZED HEALTH CARE EDUCATION/TRAINING PROGRAM

## 2022-03-01 PROCEDURE — 25010000002 FENTANYL CITRATE (PF) 50 MCG/ML SOLUTION: Performed by: ANESTHESIOLOGY

## 2022-03-01 PROCEDURE — 0 CEFAZOLIN IN DEXTROSE 2-4 GM/100ML-% SOLUTION: Performed by: STUDENT IN AN ORGANIZED HEALTH CARE EDUCATION/TRAINING PROGRAM

## 2022-03-01 PROCEDURE — 10140 I&D HMTMA SEROMA/FLUID COLLJ: CPT | Performed by: SURGERY

## 2022-03-01 PROCEDURE — 25010000002 SUCCINYLCHOLINE PER 20 MG: Performed by: NURSE ANESTHETIST, CERTIFIED REGISTERED

## 2022-03-01 PROCEDURE — 25010000002 ONDANSETRON PER 1 MG: Performed by: NURSE ANESTHETIST, CERTIFIED REGISTERED

## 2022-03-01 PROCEDURE — 0J9C3ZZ DRAINAGE OF PELVIC REGION SUBCUTANEOUS TISSUE AND FASCIA, PERCUTANEOUS APPROACH: ICD-10-PCS | Performed by: SURGERY

## 2022-03-01 PROCEDURE — 25010000002 FENTANYL CITRATE (PF) 50 MCG/ML SOLUTION: Performed by: NURSE ANESTHETIST, CERTIFIED REGISTERED

## 2022-03-01 PROCEDURE — 85025 COMPLETE CBC W/AUTO DIFF WBC: CPT | Performed by: STUDENT IN AN ORGANIZED HEALTH CARE EDUCATION/TRAINING PROGRAM

## 2022-03-01 PROCEDURE — 25010000002 PROPOFOL 10 MG/ML EMULSION: Performed by: NURSE ANESTHETIST, CERTIFIED REGISTERED

## 2022-03-01 PROCEDURE — 25010000002 CEFAZOLIN PER 500 MG: Performed by: SURGERY

## 2022-03-01 PROCEDURE — 25010000002 KETOROLAC TROMETHAMINE PER 15 MG: Performed by: NURSE ANESTHETIST, CERTIFIED REGISTERED

## 2022-03-01 PROCEDURE — 25010000002 VANCOMYCIN 10 G RECONSTITUTED SOLUTION: Performed by: SURGERY

## 2022-03-01 PROCEDURE — 25010000002 MAGNESIUM SULFATE PER 500 MG OF MAGNESIUM: Performed by: NURSE ANESTHETIST, CERTIFIED REGISTERED

## 2022-03-01 PROCEDURE — 82962 GLUCOSE BLOOD TEST: CPT

## 2022-03-01 PROCEDURE — 99024 POSTOP FOLLOW-UP VISIT: CPT | Performed by: SURGERY

## 2022-03-01 PROCEDURE — 87147 CULTURE TYPE IMMUNOLOGIC: CPT | Performed by: SURGERY

## 2022-03-01 PROCEDURE — 87070 CULTURE OTHR SPECIMN AEROBIC: CPT | Performed by: SURGERY

## 2022-03-01 PROCEDURE — 87205 SMEAR GRAM STAIN: CPT | Performed by: SURGERY

## 2022-03-01 PROCEDURE — 87075 CULTR BACTERIA EXCEPT BLOOD: CPT | Performed by: SURGERY

## 2022-03-01 PROCEDURE — 25010000002 DEXAMETHASONE PER 1 MG: Performed by: NURSE ANESTHETIST, CERTIFIED REGISTERED

## 2022-03-01 PROCEDURE — 25010000002 MIDAZOLAM PER 1 MG: Performed by: ANESTHESIOLOGY

## 2022-03-01 RX ORDER — NITROGLYCERIN 0.4 MG/1
0.4 TABLET SUBLINGUAL
Status: DISCONTINUED | OUTPATIENT
Start: 2022-03-01 | End: 2022-03-02 | Stop reason: HOSPADM

## 2022-03-01 RX ORDER — ONDANSETRON 2 MG/ML
INJECTION INTRAMUSCULAR; INTRAVENOUS AS NEEDED
Status: DISCONTINUED | OUTPATIENT
Start: 2022-03-01 | End: 2022-03-01 | Stop reason: SURG

## 2022-03-01 RX ORDER — ONDANSETRON 2 MG/ML
4 INJECTION INTRAMUSCULAR; INTRAVENOUS ONCE AS NEEDED
Status: DISCONTINUED | OUTPATIENT
Start: 2022-03-01 | End: 2022-03-01 | Stop reason: HOSPADM

## 2022-03-01 RX ORDER — FENTANYL CITRATE 50 UG/ML
50 INJECTION, SOLUTION INTRAMUSCULAR; INTRAVENOUS
Status: DISCONTINUED | OUTPATIENT
Start: 2022-03-01 | End: 2022-03-01 | Stop reason: HOSPADM

## 2022-03-01 RX ORDER — FAMOTIDINE 10 MG/ML
20 INJECTION, SOLUTION INTRAVENOUS ONCE
Status: COMPLETED | OUTPATIENT
Start: 2022-03-01 | End: 2022-03-01

## 2022-03-01 RX ORDER — LIDOCAINE HYDROCHLORIDE 10 MG/ML
0.5 INJECTION, SOLUTION EPIDURAL; INFILTRATION; INTRACAUDAL; PERINEURAL ONCE AS NEEDED
Status: DISCONTINUED | OUTPATIENT
Start: 2022-03-01 | End: 2022-03-01 | Stop reason: HOSPADM

## 2022-03-01 RX ORDER — PROMETHAZINE HYDROCHLORIDE 25 MG/1
25 TABLET ORAL ONCE AS NEEDED
Status: DISCONTINUED | OUTPATIENT
Start: 2022-03-01 | End: 2022-03-01 | Stop reason: HOSPADM

## 2022-03-01 RX ORDER — MAGNESIUM HYDROXIDE 1200 MG/15ML
LIQUID ORAL AS NEEDED
Status: DISCONTINUED | OUTPATIENT
Start: 2022-03-01 | End: 2022-03-01 | Stop reason: HOSPADM

## 2022-03-01 RX ORDER — HYDROMORPHONE HYDROCHLORIDE 1 MG/ML
0.5 INJECTION, SOLUTION INTRAMUSCULAR; INTRAVENOUS; SUBCUTANEOUS
Status: DISCONTINUED | OUTPATIENT
Start: 2022-03-01 | End: 2022-03-01 | Stop reason: HOSPADM

## 2022-03-01 RX ORDER — LABETALOL HYDROCHLORIDE 5 MG/ML
5 INJECTION, SOLUTION INTRAVENOUS
Status: DISCONTINUED | OUTPATIENT
Start: 2022-03-01 | End: 2022-03-01 | Stop reason: HOSPADM

## 2022-03-01 RX ORDER — SODIUM CHLORIDE 0.9 % (FLUSH) 0.9 %
3 SYRINGE (ML) INJECTION EVERY 12 HOURS SCHEDULED
Status: DISCONTINUED | OUTPATIENT
Start: 2022-03-01 | End: 2022-03-01 | Stop reason: HOSPADM

## 2022-03-01 RX ORDER — HYDROCODONE BITARTRATE AND ACETAMINOPHEN 7.5; 325 MG/1; MG/1
1 TABLET ORAL ONCE AS NEEDED
Status: DISCONTINUED | OUTPATIENT
Start: 2022-03-01 | End: 2022-03-01 | Stop reason: HOSPADM

## 2022-03-01 RX ORDER — LIDOCAINE HYDROCHLORIDE 20 MG/ML
INJECTION, SOLUTION INFILTRATION; PERINEURAL AS NEEDED
Status: DISCONTINUED | OUTPATIENT
Start: 2022-03-01 | End: 2022-03-01 | Stop reason: SURG

## 2022-03-01 RX ORDER — FLUMAZENIL 0.1 MG/ML
0.2 INJECTION INTRAVENOUS AS NEEDED
Status: DISCONTINUED | OUTPATIENT
Start: 2022-03-01 | End: 2022-03-01 | Stop reason: HOSPADM

## 2022-03-01 RX ORDER — SUCCINYLCHOLINE CHLORIDE 20 MG/ML
INJECTION INTRAMUSCULAR; INTRAVENOUS AS NEEDED
Status: DISCONTINUED | OUTPATIENT
Start: 2022-03-01 | End: 2022-03-01 | Stop reason: SURG

## 2022-03-01 RX ORDER — PROPOFOL 10 MG/ML
VIAL (ML) INTRAVENOUS AS NEEDED
Status: DISCONTINUED | OUTPATIENT
Start: 2022-03-01 | End: 2022-03-01 | Stop reason: SURG

## 2022-03-01 RX ORDER — DROPERIDOL 2.5 MG/ML
0.62 INJECTION, SOLUTION INTRAMUSCULAR; INTRAVENOUS ONCE
Status: CANCELLED | OUTPATIENT
Start: 2022-03-01 | End: 2022-03-01

## 2022-03-01 RX ORDER — PROMETHAZINE HYDROCHLORIDE 25 MG/1
25 SUPPOSITORY RECTAL ONCE AS NEEDED
Status: DISCONTINUED | OUTPATIENT
Start: 2022-03-01 | End: 2022-03-01 | Stop reason: HOSPADM

## 2022-03-01 RX ORDER — DIPHENHYDRAMINE HCL 25 MG
25 CAPSULE ORAL
Status: DISCONTINUED | OUTPATIENT
Start: 2022-03-01 | End: 2022-03-01 | Stop reason: HOSPADM

## 2022-03-01 RX ORDER — KETOROLAC TROMETHAMINE 30 MG/ML
INJECTION, SOLUTION INTRAMUSCULAR; INTRAVENOUS AS NEEDED
Status: DISCONTINUED | OUTPATIENT
Start: 2022-03-01 | End: 2022-03-01 | Stop reason: SURG

## 2022-03-01 RX ORDER — DIPHENHYDRAMINE HYDROCHLORIDE 50 MG/ML
12.5 INJECTION INTRAMUSCULAR; INTRAVENOUS
Status: DISCONTINUED | OUTPATIENT
Start: 2022-03-01 | End: 2022-03-01 | Stop reason: HOSPADM

## 2022-03-01 RX ORDER — SODIUM CHLORIDE, SODIUM LACTATE, POTASSIUM CHLORIDE, CALCIUM CHLORIDE 600; 310; 30; 20 MG/100ML; MG/100ML; MG/100ML; MG/100ML
9 INJECTION, SOLUTION INTRAVENOUS CONTINUOUS
Status: DISCONTINUED | OUTPATIENT
Start: 2022-03-01 | End: 2022-03-01

## 2022-03-01 RX ORDER — IBUPROFEN 600 MG/1
600 TABLET ORAL ONCE AS NEEDED
Status: DISCONTINUED | OUTPATIENT
Start: 2022-03-01 | End: 2022-03-01 | Stop reason: HOSPADM

## 2022-03-01 RX ORDER — FENTANYL CITRATE 50 UG/ML
INJECTION, SOLUTION INTRAMUSCULAR; INTRAVENOUS AS NEEDED
Status: DISCONTINUED | OUTPATIENT
Start: 2022-03-01 | End: 2022-03-01 | Stop reason: SURG

## 2022-03-01 RX ORDER — EPHEDRINE SULFATE 50 MG/ML
5 INJECTION, SOLUTION INTRAVENOUS ONCE AS NEEDED
Status: DISCONTINUED | OUTPATIENT
Start: 2022-03-01 | End: 2022-03-01 | Stop reason: HOSPADM

## 2022-03-01 RX ORDER — DEXAMETHASONE SODIUM PHOSPHATE 4 MG/ML
INJECTION, SOLUTION INTRA-ARTICULAR; INTRALESIONAL; INTRAMUSCULAR; INTRAVENOUS; SOFT TISSUE AS NEEDED
Status: DISCONTINUED | OUTPATIENT
Start: 2022-03-01 | End: 2022-03-01 | Stop reason: SURG

## 2022-03-01 RX ORDER — OXYCODONE AND ACETAMINOPHEN 7.5; 325 MG/1; MG/1
1 TABLET ORAL EVERY 4 HOURS PRN
Status: DISCONTINUED | OUTPATIENT
Start: 2022-03-01 | End: 2022-03-01 | Stop reason: HOSPADM

## 2022-03-01 RX ORDER — MAGNESIUM SULFATE HEPTAHYDRATE 500 MG/ML
INJECTION, SOLUTION INTRAMUSCULAR; INTRAVENOUS AS NEEDED
Status: DISCONTINUED | OUTPATIENT
Start: 2022-03-01 | End: 2022-03-01 | Stop reason: SURG

## 2022-03-01 RX ORDER — CEFAZOLIN SODIUM IN 0.9 % NACL 3 G/100 ML
3 INTRAVENOUS SOLUTION, PIGGYBACK (ML) INTRAVENOUS EVERY 8 HOURS
Status: DISCONTINUED | OUTPATIENT
Start: 2022-03-01 | End: 2022-03-02 | Stop reason: HOSPADM

## 2022-03-01 RX ORDER — MIDAZOLAM HYDROCHLORIDE 1 MG/ML
1 INJECTION INTRAMUSCULAR; INTRAVENOUS
Status: COMPLETED | OUTPATIENT
Start: 2022-03-01 | End: 2022-03-01

## 2022-03-01 RX ORDER — NALOXONE HCL 0.4 MG/ML
0.2 VIAL (ML) INJECTION AS NEEDED
Status: DISCONTINUED | OUTPATIENT
Start: 2022-03-01 | End: 2022-03-01 | Stop reason: HOSPADM

## 2022-03-01 RX ORDER — HYDRALAZINE HYDROCHLORIDE 20 MG/ML
5 INJECTION INTRAMUSCULAR; INTRAVENOUS
Status: DISCONTINUED | OUTPATIENT
Start: 2022-03-01 | End: 2022-03-01 | Stop reason: HOSPADM

## 2022-03-01 RX ORDER — SODIUM CHLORIDE 0.9 % (FLUSH) 0.9 %
3-10 SYRINGE (ML) INJECTION AS NEEDED
Status: DISCONTINUED | OUTPATIENT
Start: 2022-03-01 | End: 2022-03-01 | Stop reason: HOSPADM

## 2022-03-01 RX ADMIN — CEFAZOLIN SODIUM 2 G: 2 INJECTION, SOLUTION INTRAVENOUS at 08:39

## 2022-03-01 RX ADMIN — Medication 10 ML: at 18:42

## 2022-03-01 RX ADMIN — HYDROCODONE BITARTRATE AND ACETAMINOPHEN 1 TABLET: 5; 325 TABLET ORAL at 21:50

## 2022-03-01 RX ADMIN — VANCOMYCIN HYDROCHLORIDE 1500 MG: 10 INJECTION, POWDER, LYOPHILIZED, FOR SOLUTION INTRAVENOUS at 18:45

## 2022-03-01 RX ADMIN — PROPOFOL 200 MG: 10 INJECTION, EMULSION INTRAVENOUS at 13:10

## 2022-03-01 RX ADMIN — Medication 5 MG: at 21:38

## 2022-03-01 RX ADMIN — DEXAMETHASONE SODIUM PHOSPHATE 8 MG: 4 INJECTION, SOLUTION INTRAMUSCULAR; INTRAVENOUS at 13:21

## 2022-03-01 RX ADMIN — Medication 10 ML: at 23:16

## 2022-03-01 RX ADMIN — LIDOCAINE HYDROCHLORIDE 100 MG: 20 INJECTION, SOLUTION INFILTRATION; PERINEURAL at 13:10

## 2022-03-01 RX ADMIN — PROPOFOL 50 MG: 10 INJECTION, EMULSION INTRAVENOUS at 13:34

## 2022-03-01 RX ADMIN — MIDAZOLAM 1 MG: 1 INJECTION INTRAMUSCULAR; INTRAVENOUS at 11:47

## 2022-03-01 RX ADMIN — HYDROMORPHONE HYDROCHLORIDE 0.5 MG: 1 INJECTION, SOLUTION INTRAMUSCULAR; INTRAVENOUS; SUBCUTANEOUS at 08:39

## 2022-03-01 RX ADMIN — MAGNESIUM SULFATE HEPTAHYDRATE 2 G: 500 INJECTION, SOLUTION INTRAMUSCULAR; INTRAVENOUS at 13:24

## 2022-03-01 RX ADMIN — HYDROCODONE BITARTRATE AND ACETAMINOPHEN 2 TABLET: 5; 325 TABLET ORAL at 14:23

## 2022-03-01 RX ADMIN — CEFAZOLIN SODIUM 2 G: 2 INJECTION, SOLUTION INTRAVENOUS at 16:38

## 2022-03-01 RX ADMIN — ONDANSETRON 4 MG: 2 INJECTION INTRAMUSCULAR; INTRAVENOUS at 13:39

## 2022-03-01 RX ADMIN — HYDROMORPHONE HYDROCHLORIDE 0.5 MG: 1 INJECTION, SOLUTION INTRAMUSCULAR; INTRAVENOUS; SUBCUTANEOUS at 03:53

## 2022-03-01 RX ADMIN — MIDAZOLAM 1 MG: 1 INJECTION INTRAMUSCULAR; INTRAVENOUS at 11:09

## 2022-03-01 RX ADMIN — KETOROLAC TROMETHAMINE 30 MG: 30 INJECTION, SOLUTION INTRAMUSCULAR at 13:38

## 2022-03-01 RX ADMIN — FENTANYL CITRATE 100 MCG: 0.05 INJECTION, SOLUTION INTRAMUSCULAR; INTRAVENOUS at 13:06

## 2022-03-01 RX ADMIN — SUCCINYLCHOLINE CHLORIDE 200 MG: 20 INJECTION, SOLUTION INTRAMUSCULAR; INTRAVENOUS; PARENTERAL at 13:11

## 2022-03-01 RX ADMIN — FAMOTIDINE 20 MG: 10 INJECTION INTRAVENOUS at 11:09

## 2022-03-01 RX ADMIN — SODIUM CHLORIDE, POTASSIUM CHLORIDE, SODIUM LACTATE AND CALCIUM CHLORIDE 9 ML/HR: 600; 310; 30; 20 INJECTION, SOLUTION INTRAVENOUS at 11:09

## 2022-03-01 RX ADMIN — FENTANYL CITRATE 50 MCG: 50 INJECTION INTRAMUSCULAR; INTRAVENOUS at 14:23

## 2022-03-01 RX ADMIN — CEFAZOLIN SODIUM 2 G: 2 INJECTION, SOLUTION INTRAVENOUS at 13:31

## 2022-03-01 RX ADMIN — CEFAZOLIN SODIUM 3 G: 10 INJECTION, POWDER, FOR SOLUTION INTRAVENOUS at 21:50

## 2022-03-01 RX ADMIN — FENTANYL CITRATE 50 MCG: 50 INJECTION INTRAMUSCULAR; INTRAVENOUS at 12:29

## 2022-03-01 RX ADMIN — LOSARTAN POTASSIUM 50 MG: 50 TABLET, FILM COATED ORAL at 16:39

## 2022-03-01 NOTE — PLAN OF CARE
Goal Outcome Evaluation:              Outcome Summary: Pt new admit for wound dehiscence from recent inguinal hernia repair. RA, AAOx4. NPO at midnight upheld. Administered prn norco and dilaudid for scrotal pain. Pt ambulating to toilet standy assist. Steri strips applied to wound. Monitoring for s/s bleeding upheld. General surgery to see pt today. VSS. Will CTM.

## 2022-03-01 NOTE — CASE MANAGEMENT/SOCIAL WORK
Discharge Planning Assessment  [facility]     Patient Name: Guevara Barreto  MRN: [id number]  Today's Date: 3/1/2022    Admit Date: 2/28/2022     Discharge Needs Assessment     Row Name 03/01/22 2346       Living Environment    Lives With spouse; child(nancy), dependent    Name(s) of Who Lives With Patient wife, Emily ( [phone]) and 13 year old son    Current Living Arrangements home/apartment/condo    Primary Care Provided by self    Provides Primary Care For child(nancy)  has 13 year old son    Family Caregiver if Needed none    Quality of Family Relationships helpful; involved; supportive    Able to Return to Prior Arrangements yes       Resource/Environmental Concerns    Resource/Environmental Concerns none    Transportation Concerns car, none       Transition Planning    Patient/Family Anticipates Transition to home with family    Patient/Family Anticipated Services at Transition none    Transportation Anticipated family or friend will provide       Discharge Needs Assessment    Equipment Currently Used at Home glucometer    Concerns to be Addressed no discharge needs identified; denies needs/concerns at this time    Anticipated Changes Related to Illness none    Equipment Needed After Discharge none               Discharge Plan     Row Name 03/01/22 7110       Plan    Plan plans to return home- CCP will follow for needs    Patient/Family in Agreement with Plan yes    Plan Comments Spoke with patient at bedside.  Facesheet, PCP and pharmacy verified.  Patient lives with his wife, Emily ( [phone]) , and 13 year old son. He is IADLS and works full time.  He does not use any DME and does not have a HH or SNF history.  At dc, he plans to return home and and does not anticipate any dc needs.  Will continue to follow. Lisette Tobias RN              Continued Care and Services - Admitted Since 2/28/2022    Coordination has not been started for this encounter.       Expected Discharge Date and Time     Expected  Discharge Date Expected Discharge Time    Mar 3, 2022          Demographic Summary     Row Name 03/01/22 1551       General Information    Admission Type inpatient    Arrived From emergency department    Referral Source admission list    Reason for Consult discharge planning    Preferred Language English               Functional Status     Row Name 03/01/22 1551       Functional Status    Usual Activity Tolerance good    Current Activity Tolerance good       Functional Status, IADL    Medications independent    Meal Preparation independent    Housekeeping independent    Laundry independent    Shopping independent       Mental Status    General Appearance WDL WDL               Psychosocial    No documentation.                Abuse/Neglect    No documentation.                Legal    No documentation.                Substance Abuse    No documentation.                Patient Forms    No documentation.                   Lisette Tobias, RN

## 2022-03-01 NOTE — ANESTHESIA POSTPROCEDURE EVALUATION
"Patient: Guevara Barreto    Procedure Summary     Date: 03/01/22 Room / Location: [facility] OR  / [facility] MAIN OR    Anesthesia Start: 1303 Anesthesia Stop: 1357    Procedure: RIGHT INGUINAL WOUND EXPLORATION AND DRAINAGE (N/A ) Diagnosis:     Surgeons: Anton Mueller MD Provider: Shani Zamora MD    Anesthesia Type: general ASA Status: 3          Anesthesia Type: general    Vitals  Vitals Value Taken Time   /62 03/01/22 1401   Temp 37.1 °C (98.7 °F) 03/01/22 1353   Pulse 96 03/01/22 1415   Resp 16 03/01/22 1400   SpO2 96 % 03/01/22 1415   Vitals shown include unvalidated device data.        Post Anesthesia Care and Evaluation    Patient location during evaluation: PACU  Patient participation: complete - patient participated  Level of consciousness: awake  Pain management: adequate  Airway patency: patent  Anesthetic complications: No anesthetic complications    Cardiovascular status: acceptable  Respiratory status: acceptable  Hydration status: acceptable    Comments: /62 (BP Location: Right arm, Patient Position: Lying)   Pulse 101   Temp 37.1 °C (98.7 °F) (Oral)   Resp 16   Ht 190.5 cm (75\")   Wt (!) 139 kg (307 lb)   SpO2 100%   BMI 38.37 kg/m²       "

## 2022-03-01 NOTE — ANESTHESIA PREPROCEDURE EVALUATION
Anesthesia Evaluation     Patient summary reviewed and Nursing notes reviewed   history of anesthetic complications (post op headache):  NPO Solid Status: > 8 hours             Airway   Mallampati: III  TM distance: >3 FB  Neck ROM: full  Large neck circumference  Dental - normal exam     Pulmonary    (+) sleep apnea (at risk , sleep study scheduled),   (-) shortness of breath, not a smoker  Cardiovascular     (+) hypertension,   (-) dysrhythmias, angina, hyperlipidemia    ROS comment: Cardiomegaly, LVH    Neuro/Psych  GI/Hepatic/Renal/Endo    (+) morbid obesity, GERD,  diabetes mellitus,   (-) liver disease, no renal disease    Musculoskeletal     Abdominal    Substance History      OB/GYN          Other                          Anesthesia Plan    ASA 3     general     intravenous induction     Anesthetic plan, all risks, benefits, and alternatives have been provided, discussed and informed consent has been obtained with: patient.        CODE STATUS:

## 2022-03-01 NOTE — ANESTHESIA PROCEDURE NOTES
Airway  Urgency: elective    Date/Time: 3/1/2022 1:13 PM    General Information and Staff    Patient location during procedure: OR  Anesthesiologist: Shani Zamora MD  CRNA: Susy Valadez CRNA    Indications and Patient Condition  Indications for airway management: airway protection    Preoxygenated: yes  Mask difficulty assessment: 1 - vent by mask    Final Airway Details  Final airway type: endotracheal airway      Successful airway: ETT  Cuffed: yes   Successful intubation technique: direct laryngoscopy  Facilitating devices/methods: cricoid pressure  Endotracheal tube insertion site: oral  Blade: Beltrán  Blade size: 2  ETT size (mm): 7.5  Cormack-Lehane Classification: grade IIa - partial view of glottis  Placement verified by: chest auscultation and capnometry   Cuff volume (mL): 5  Measured from: lips  ETT/EBT  to lips (cm): 23  Number of attempts at approach: 1  Assessment: lips, teeth, and gum same as pre-op    Additional Comments  EBBS: ETCO2+: Atraumatic intubation

## 2022-03-01 NOTE — PROGRESS NOTES
His right inguinal incision remains intact with only slight separation medially where serous drainage is occurring.  I think the erythema is more related to the undrained seroma than actually cellulitis.  I recommended going to surgery today to open the wound, evacuate the seroma, and likely close over a drain.

## 2022-03-01 NOTE — PROGRESS NOTES
"[facility] Clinical Pharmacy Services: Vancomycin Pharmacokinetic Initial Consult Note    Guevara Barreto is a 51 y.o. male who is on day 1 of pharmacy to dose vancomycin.    Indication: SSTI  Consulting Provider: Dr. Mueller  Planned Duration of Therapy: 7 days  Loading Dose Ordered or Given: 3000 mg on 2/28 at 2300  MRSA PCR performed: n/a; Result: n/a  Culture/Source: blood pending, body fluid (groin): 2+ gpc  Target: -600 mg/L.hr   Other Antimicrobials: cefazolin    Vitals/Labs  Ht: 190.5 cm (75\"); Wt: (!) 139 kg (307 lb)  Temp Readings from Last 1 Encounters:   03/01/22 97.9 °F (36.6 °C) (Oral)    Estimated Creatinine Clearance: 249.1 mL/min (A) (by C-G formula based on SCr of 0.52 mg/dL (L)).       Results from last 7 days   Lab Units 03/01/22  0626 02/28/22  1845   CREATININE mg/dL  --  0.52*   WBC 10*3/mm3 11.02* 14.39*     Assessment/Plan:    Vancomycin Dose:   1500 mg IV every  12  hours  Predictive AUC level for the dose ordered is 458 mg/L.hr, which is within the target of 400-600 mg/L.hr  Vanc Trough has been ordered for 3/2 at 1730     Pharmacy will follow patient's kidney function and will adjust doses and obtain levels as necessary. Thank you for involving pharmacy in this patient's care. Please contact pharmacy with any questions or concerns.                           Ervin Chi [facility]  Clinical Pharmacist    "

## 2022-03-01 NOTE — H&P
General Surgery history and physical exam    Summary:    Mr. Guevara Barreto is a 51 y.o. year old gentleman status post open inguinal hernia repair with a seroma and cellulitis.  Will admit for IV antibiotics.  N.p.o. after midnight for possible washout and drain placement in the OR tomorrow.    Chief Complaint:    Drainage    History of Present Illness:    Mr. Guevara Barreto is a 51 y.o. year old gentleman who underwent an open right inguinal hernia repair with mesh with Dr. Mueller 2/15/2022.  He presents today because he is having a large amount of drainage from his incision.  He knew he had a seroma that he has been monitoring.  His drain was removed in the office recently. No fever or chills.     Past Medical History:   Past Medical History:   Diagnosis Date   • At risk for sleep apnea     STOP BANG - 5   • Diabetes mellitus (HCC)    • GERD (gastroesophageal reflux disease)    • Groin rash    • Hypertension    • Painful urination    • Right inguinal hernia    • Scrotal swelling       Past Surgical History:    Past Surgical History:   Procedure Laterality Date   • AMPUTATION Left 2003    MIDDLE TOE   • COLONOSCOPY  01/2020    Pt reports 2 polyps, repeat in 10 years, [facility]   • INGUINAL HERNIA REPAIR Right 2/15/2022    Procedure: OPEN RIGHT INGUINAL HERNIA REPAIR WITH MESH;  Surgeon: Anton Mueller MD;  Location: [facility];  Service: General;  Laterality: Right;   • MANDIBLE FRACTURE SURGERY       Family History:    Family History   Problem Relation Age of Onset   • Hypertension Father    • Arthritis Father    • Cancer Father    • Malig Hyperthermia Neg Hx      Social History:    Social History     Socioeconomic History   • Marital status:    Tobacco Use   • Smoking status: Never Smoker   • Smokeless tobacco: Never Used   Vaping Use   • Vaping Use: Never used   Substance and Sexual Activity   • Alcohol use: Yes     Comment: 1 WINE COOLER AT NIGHT   • Drug use: Never   • Sexual  activity: Defer   •   Allergies:   Allergies   Allergen Reactions   • Penicillins Hives and Rash     Medications:     Current Facility-Administered Medications:   •  [COMPLETED] Insert peripheral IV, , , Once **AND** sodium chloride 0.9 % flush 10 mL, 10 mL, Intravenous, PRN, Rubens Zamora MD  •  sodium chloride 0.9 % infusion, 125 mL/hr, Intravenous, Continuous, Rubens Zamora MD, Last Rate: 125 mL/hr at 02/28/22 2002, 125 mL/hr at 02/28/22 2002  •  vancomycin 3000 mg/1000 mL 0.9% NS IVPB, 20 mg/kg, Intravenous, Once, Rubens Zamora MD    Radiology/Endoscopy:    • No imaging today    Labs:    • WBC 14 Hgb 12 platelets 428 Cr 0.52    Review of Systems:   Influenza-like illness: no fever, no  cough, no  sore throat, no  body aches, no loss of sense of taste or smell, no known exposure to person with Covid-19.  Constitutional: Negative for fevers or chills  HENT: Negative for hearing loss or runny nose  Eyes: Negative for vision changes or scleral icterus  Respiratory: Negative for cough or shortness of breath  Cardiovascular: Negative for chest pain or heart palpitations  Gastrointestinal: Negative for abdominal pain, nausea, vomiting, constipation, melena, or hematochezia  Genitourinary: Negative for hematuria or dysuria  Musculoskeletal: Negative for joint swelling or gait instability  Neurologic: Negative for tremors or seizures  Psychiatric: Negative for suicidal ideations or depression  All other systems reviewed and negative    Physical Exam:   • Constitutional: Well-developed, well-nourished, no acute distress  • Eyes: Conjunctiva normal, sclera nonicteric  • ENMT: Hearing grossly normal, oral mucosa moist  • Neck: Supple, trachea midline  • Respiratory: Clear to auscultation, normal inspiratory effort  • Cardiovascular: Regular rate, no peripheral edema, no jugular venous distention  • Gastrointestinal: Soft, nontender  • Skin:  Warm, dry, no rash on visualized skin surfaces, right groin incision with mild  erythema and drainage from medial aspect  • Musculoskeletal: Symmetric strength, normal gait  • Psychiatric: Alert and oriented ×3, normal affect     DEJAN AGOSTO M.D.  General and Endoscopic Surgery  [facility] [place], operated by [facility]    [address], [address]  [place], [place], [address]  P: [phone]  F: [phone]

## 2022-03-01 NOTE — OP NOTE
PREOPERATIVE DIAGNOSIS:  Seroma status post large open right inguinal hernia repair    POSTOPERATIVE DIAGNOSIS (FINDINGS):  Probable infected seroma    PROCEDURE:  Incision and drainage of infected inguinal seroma    SURGEON:  Anton Mueller MD    ASSISTANT:  Emely Bhardwaj, was responsible for performing the following activities: suction, irrigation, retraction, and placing dressing, and their skilled assistance was necessary for the success of this case.    ANESTHESIA:  General    EBL:  Minimal    SPECIMEN(S):  Wound culture    DESCRIPTION:  In supine position under general anesthetic prepped and draped usual sterile manner.  The medial aspect of his open inguinal hernia incision where drainage was already occurring was opened for approximately 1 inch.  The rest of the incision was intact.  Murky fluid was encountered and cultured and this was aspirated.  The cavity extended from the area just below the incision to the scrotum.  This was irrigated out copiously.  Good hemostasis was noted.  Wound was packed with Betadine soaked gauze.  Tolerated well.    Anton Mueller M.D.

## 2022-03-01 NOTE — CASE MANAGEMENT/SOCIAL WORK
Continued Stay Note  [facility]     Patient Name: Guevara Barreto  MRN: [id number]  Today's Date: 3/1/2022    Admit Date: 2/28/2022     Discharge Plan     Row Name 03/01/22 1206       Plan    Plan Comments Patient in OR.  CCP will follow up on Wednesday. Lisette Tobias RN               Discharge Codes    No documentation.                     Lisette Tobias RN

## 2022-03-01 NOTE — ED PROVIDER NOTES
EMERGENCY DEPARTMENT ENCOUNTER    Room Number:  26/26  Date of encounter:  2/28/2022  PCP: Bigg Hassan MD  Historian: Patient    Patient was placed in face mask during triage process. Patient was wearing facemask when I entered the room and throughout our encounter. I wore full protective equipment throughout this patient encounter including a face mask, eye protection, and gloves. Hand hygiene was performed before donning protective equipment and again following doffing of PPE after leaving the room.    HPI:  Chief Complaint: Right groin pain and bleeding  A complete HPI/ROS/PMH/PSH/SH/FH are unobtainable due to: N/A   Context: Guevara Barreto is a 51 y.o. male who is 2 weeks postop from right inguinal hernia repair by Dr. Mueller presents to the ED c/o abrupt onset bleeding from the surgical site today in the right groin with persistent swelling of the right groin and discomfort radiating into the testicle since surgery.  No fevers, change in bowel habits, cough, shortness of breath.  No exacerbating or relieving factors.  Mild to moderate discomfort at this time.  Declines offer for pain medication.      MEDICAL HISTORY REVIEW  EMR reviewed    PAST MEDICAL HISTORY  Active Ambulatory Problems     Diagnosis Date Noted   • Gastroesophageal reflux disease without esophagitis 05/13/2021   • Anterior epistaxis 05/13/2021   • Cardiomegaly 05/13/2021   • Chronic cough 05/13/2021   • Essential hypertension 12/20/2016   • Generalized anxiety disorder 05/13/2021   • Impaired fasting glucose 05/29/2018   • Left ventricular hypertrophy 05/13/2021   • Mild major depression (HCC) 05/13/2021   • Seasonal allergic rhinitis 05/29/2018   • Right inguinal hernia 01/04/2022     Resolved Ambulatory Problems     Diagnosis Date Noted   • No Resolved Ambulatory Problems     Past Medical History:   Diagnosis Date   • At risk for sleep apnea    • Diabetes mellitus (HCC)    • GERD (gastroesophageal reflux disease)    • Groin rash    •  Hypertension    • Painful urination    • Scrotal swelling          PAST SURGICAL HISTORY  Past Surgical History:   Procedure Laterality Date   • AMPUTATION Left 2003    MIDDLE TOE   • COLONOSCOPY  01/2020    Pt reports 2 polyps, repeat in 10 years, [facility]   • INGUINAL HERNIA REPAIR Right 2/15/2022    Procedure: OPEN RIGHT INGUINAL HERNIA REPAIR WITH MESH;  Surgeon: Anton Mueller MD;  Location: [facility] OR;  Service: General;  Laterality: Right;   • MANDIBLE FRACTURE SURGERY           FAMILY HISTORY  Family History   Problem Relation Age of Onset   • Hypertension Father    • Arthritis Father    • Cancer Father    • Malig Hyperthermia Neg Hx          SOCIAL HISTORY  Social History     Socioeconomic History   • Marital status:    Tobacco Use   • Smoking status: Never Smoker   • Smokeless tobacco: Never Used   Vaping Use   • Vaping Use: Never used   Substance and Sexual Activity   • Alcohol use: Yes     Comment: 1 WINE COOLER AT NIGHT   • Drug use: Never   • Sexual activity: Defer         ALLERGIES  Penicillins        REVIEW OF SYSTEMS  Review of Systems     All systems reviewed and negative except for those discussed in HPI.       PHYSICAL EXAM    I have reviewed the triage vital signs and nursing notes.    ED Triage Vitals   Temp Heart Rate Resp BP SpO2   02/28/22 1730 02/28/22 1730 02/28/22 1730 02/28/22 1847 02/28/22 1730   99.5 °F (37.5 °C) 118 18 112/66 94 %      Temp src Heart Rate Source Patient Position BP Location FiO2 (%)   -- -- 02/28/22 1847 02/28/22 1847 --     Lying Right arm        Physical Exam    Physical Exam   Constitutional: No distress.  Nontoxic but somewhat uncomfortable appearing  HENT:  Head: Normocephalic and atraumatic.   Oropharynx: Mucous membranes are moist.   Eyes: No scleral icterus. No conjunctival pallor.  Neck: Painless range of motion noted. Neck supple.   Cardiovascular: Normal rate, regular rhythm and intact distal pulses.  Pulmonary/Chest: No  respiratory distress. There are no wheezes, no rhonchi, and no rales.   Abdominal: Large habitus soft. There is no tenderness. There is no rebound and no guarding.  Large inguinal swelling with erythema, this includes his scrotum.  There is mild induration with no fluctuance appreciated.  Surgical wound on the most medial portion is slightly dehisced with persistently draining thin pinkish-white drainage.  Moderate discomfort with palpation of this area.  Musculoskeletal: Moves all extremities equally. There is no pedal edema or calf tenderness.   Neurological: Alert.  Baseline strength and sensation noted.   Skin: Skin is pink, warm, and dry. No pallor.   Psychiatric: Mood and affect normal.   Nursing note and vitals reviewed.    LAB RESULTS  Recent Results (from the past 24 hour(s))   Comprehensive Metabolic Panel    Collection Time: 02/28/22  6:45 PM    Specimen: Blood   Result Value Ref Range    Glucose 146 (H) 65 - 99 mg/dL    BUN 12 6 - 20 mg/dL    Creatinine 0.52 (L) 0.76 - 1.27 mg/dL    Sodium 133 (L) 136 - 145 mmol/L    Potassium 3.1 (L) 3.5 - 5.2 mmol/L    Chloride 93 (L) 98 - 107 mmol/L    CO2 23.0 22.0 - 29.0 mmol/L    Calcium 8.7 8.6 - 10.5 mg/dL    Total Protein 6.6 6.0 - 8.5 g/dL    Albumin 2.70 (L) 3.50 - 5.20 g/dL    ALT (SGPT) 27 1 - 41 U/L    AST (SGOT) 22 1 - 40 U/L    Alkaline Phosphatase 81 39 - 117 U/L    Total Bilirubin 0.5 0.0 - 1.2 mg/dL    Globulin 3.9 gm/dL    A/G Ratio 0.7 g/dL    BUN/Creatinine Ratio 23.1 7.0 - 25.0    Anion Gap 17.0 (H) 5.0 - 15.0 mmol/L    eGFR 121.3 >60.0 mL/min/1.73   Protime-INR    Collection Time: 02/28/22  6:45 PM    Specimen: Blood   Result Value Ref Range    Protime 14.9 (H) 11.7 - 14.2 Seconds    INR 1.18 (H) 0.90 - 1.10   aPTT    Collection Time: 02/28/22  6:45 PM    Specimen: Blood   Result Value Ref Range    PTT 31.2 22.7 - 35.4 seconds   CBC Auto Differential    Collection Time: 02/28/22  6:45 PM    Specimen: Blood   Result Value Ref Range    WBC 14.39 (H)  3.40 - 10.80 10*3/mm3    RBC 4.69 4.14 - 5.80 10*6/mm3    Hemoglobin 12.5 (L) 13.0 - 17.7 g/dL    Hematocrit 38.3 37.5 - 51.0 %    MCV 81.7 79.0 - 97.0 fL    MCH 26.7 26.6 - 33.0 pg    MCHC 32.6 31.5 - 35.7 g/dL    RDW 13.3 12.3 - 15.4 %    RDW-SD 38.3 37.0 - 54.0 fl    MPV 9.0 6.0 - 12.0 fL    Platelets 428 140 - 450 10*3/mm3    Neutrophil % 77.9 (H) 42.7 - 76.0 %    Lymphocyte % 8.3 (L) 19.6 - 45.3 %    Monocyte % 11.7 5.0 - 12.0 %    Eosinophil % 0.2 (L) 0.3 - 6.2 %    Basophil % 0.5 0.0 - 1.5 %    Immature Grans % 1.4 (H) 0.0 - 0.5 %    Neutrophils, Absolute 11.22 (H) 1.70 - 7.00 10*3/mm3    Lymphocytes, Absolute 1.19 0.70 - 3.10 10*3/mm3    Monocytes, Absolute 1.68 (H) 0.10 - 0.90 10*3/mm3    Eosinophils, Absolute 0.03 0.00 - 0.40 10*3/mm3    Basophils, Absolute 0.07 0.00 - 0.20 10*3/mm3    Immature Grans, Absolute 0.20 (H) 0.00 - 0.05 10*3/mm3    nRBC 0.0 0.0 - 0.2 /100 WBC       Ordered the above labs and independently reviewed the results.        RADIOLOGY  No Radiology Exams Resulted Within Past 24 Hours    I ordered the above noted radiological studies. Reviewed by me and discussed with radiologist.  See dictation for official radiology interpretation.      PROCEDURES    Procedures        MEDICATIONS GIVEN IN ER    Medications   sodium chloride 0.9 % flush 10 mL (has no administration in time range)   cefTRIAXone (ROCEPHIN) 2 g in sodium chloride 0.9 % 100 mL IVPB-VTB (has no administration in time range)   vancomycin 3000 mg/1000 mL 0.9% NS IVPB (has no administration in time range)   sodium chloride 0.9 % bolus 500 mL (has no administration in time range)   sodium chloride 0.9 % infusion (has no administration in time range)   potassium chloride (K-DUR,KLOR-CON) ER tablet 40 mEq (has no administration in time range)         PROGRESS, DATA ANALYSIS, CONSULTS, AND MEDICAL DECISION MAKING    My differential diagnosis for abdominal pain includes but is not limited to:  Gastritis, gastroenteritis, peptic  ulcer disease, GERD, esophageal perforation, acute appendicitis, mesenteric adenitis, Meckel’s diverticulum, epiploic appendagitis, diverticulitis, colon cancer, ulcerative colitis, Crohn’s disease, intussusception, small bowel obstruction, adhesions, ischemic bowel, perforated viscus, ileus, obstipation, biliary colic, cholecystitis, cholelithiasis, Isacc-Eugene Pedro, hepatitis, pancreatitis, common bile duct obstruction, cholangitis, bile leak, splenic trauma, splenic rupture, splenic infarction, splenic abscess, abdominal abscess, ascites, spontaneous bacterial peritonitis, hernia, UTI, cystitis, prostatitis, ureterolithiasis, urinary obstruction, AAA, myocardial infarction, pneumonia, cancer, porphyria, DKA, medications, sickle cell, viral syndrome, zoster      All labs have been independently reviewed by me.  All radiology studies have been reviewed by me and discussed with radiologist dictating the report.   EKG's independently viewed and interpreted by me.  Discussion below represents my analysis of pertinent findings related to patient's condition, differential diagnosis, treatment plan and final disposition.      ED Course as of 02/28/22 1926 Mon Feb 28, 2022   1858 CONSULT        Provider: Dr. Prince-General surgery    Discussion: Reviewed patient history, presentation and evaluation.  She will see the patient in the emergency department.    Agreeable c treatment and planned disposition.         [RS]   1914 Dr. Prince has been to bedside and evaluated the patient.  She requests patient be admitted on Dr. Mueller's service.  She is agreeable with plan for some vancomycin and ceftriaxone. [RS]   1926 Patient agreeable with admission plan. [RS]      ED Course User Index  [RS] Rubens Zamora MD       AS OF 19:26 EST VITALS:    BP - 112/66  HR - 118  TEMP - 99.5 °F (37.5 °C)  O2 SATS - 94%        DIAGNOSIS  Final diagnoses:   Postoperative wound dehiscence, initial encounter   Cellulitis of right groin    Hypokalemia         DISPOSITION  ADMISSION    Discussed treatment plan and reason for admission with pt/family and admitting physician.  Pt/family voiced understanding of the plan for admission for further testing/treatment as needed.          Rubens Zamora MD  02/28/22 1922

## 2022-03-02 ENCOUNTER — TELEPHONE (OUTPATIENT)
Dept: SURGERY | Facility: CLINIC | Age: 52
End: 2022-03-02

## 2022-03-02 VITALS
HEIGHT: 75 IN | OXYGEN SATURATION: 95 % | DIASTOLIC BLOOD PRESSURE: 77 MMHG | WEIGHT: 307 LBS | TEMPERATURE: 97.8 F | SYSTOLIC BLOOD PRESSURE: 115 MMHG | HEART RATE: 91 BPM | RESPIRATION RATE: 18 BRPM | BODY MASS INDEX: 38.17 KG/M2

## 2022-03-02 LAB
BACTERIA FLD CULT: ABNORMAL
CREAT SERPL-MCNC: 0.4 MG/DL (ref 0.76–1.27)
EGFRCR SERPLBLD CKD-EPI 2021: 131.3 ML/MIN/1.73
GLUCOSE BLDC GLUCOMTR-MCNC: 148 MG/DL (ref 70–130)
GLUCOSE BLDC GLUCOMTR-MCNC: 155 MG/DL (ref 70–130)
GRAM STN SPEC: ABNORMAL
GRAM STN SPEC: ABNORMAL

## 2022-03-02 PROCEDURE — 25010000002 CEFAZOLIN PER 500 MG: Performed by: SURGERY

## 2022-03-02 PROCEDURE — 82565 ASSAY OF CREATININE: CPT | Performed by: SURGERY

## 2022-03-02 PROCEDURE — 25010000002 VANCOMYCIN 10 G RECONSTITUTED SOLUTION: Performed by: SURGERY

## 2022-03-02 PROCEDURE — 82962 GLUCOSE BLOOD TEST: CPT

## 2022-03-02 RX ORDER — SODIUM HYPOCHLORITE 1.25 MG/ML
SOLUTION TOPICAL 2 TIMES DAILY
Status: DISCONTINUED | OUTPATIENT
Start: 2022-03-02 | End: 2022-03-02 | Stop reason: HOSPADM

## 2022-03-02 RX ORDER — TEMAZEPAM 7.5 MG/1
15 CAPSULE ORAL NIGHTLY PRN
Qty: 21 CAPSULE | Refills: 0 | Status: SHIPPED | OUTPATIENT
Start: 2022-03-02 | End: 2022-04-04

## 2022-03-02 RX ORDER — HYDROCODONE BITARTRATE AND ACETAMINOPHEN 5; 325 MG/1; MG/1
TABLET ORAL
Qty: 24 TABLET | Refills: 0 | Status: SHIPPED | OUTPATIENT
Start: 2022-03-02 | End: 2022-04-04

## 2022-03-02 RX ORDER — SULFAMETHOXAZOLE AND TRIMETHOPRIM 800; 160 MG/1; MG/1
1 TABLET ORAL 2 TIMES DAILY
Qty: 20 TABLET | Refills: 0 | Status: SHIPPED | OUTPATIENT
Start: 2022-03-02 | End: 2022-03-07 | Stop reason: ALTCHOICE

## 2022-03-02 RX ORDER — NITROGLYCERIN 0.4 MG/1
0.4 TABLET SUBLINGUAL
Status: DISCONTINUED | OUTPATIENT
Start: 2022-03-02 | End: 2022-03-02 | Stop reason: HOSPADM

## 2022-03-02 RX ORDER — ONDANSETRON 4 MG/1
4 TABLET, FILM COATED ORAL EVERY 6 HOURS PRN
Qty: 10 TABLET | Refills: 1 | Status: SHIPPED | OUTPATIENT
Start: 2022-03-02 | End: 2022-04-04

## 2022-03-02 RX ADMIN — CEFAZOLIN SODIUM 3 G: 10 INJECTION, POWDER, FOR SOLUTION INTRAVENOUS at 04:37

## 2022-03-02 RX ADMIN — VANCOMYCIN HYDROCHLORIDE 1500 MG: 10 INJECTION, POWDER, LYOPHILIZED, FOR SOLUTION INTRAVENOUS at 06:24

## 2022-03-02 RX ADMIN — PANTOPRAZOLE SODIUM 40 MG: 40 TABLET, DELAYED RELEASE ORAL at 06:25

## 2022-03-02 RX ADMIN — Medication 10 ML: at 08:16

## 2022-03-02 NOTE — DISCHARGE SUMMARY
DATE OF ADMIT: 2/28/2022    DATE OF DISCHARGE: 3/2/2022    DIAGNOSIS: Postop seroma and wound infection    PROCEDURES: Incision and drainage of infected right inguinal seroma 3/1/2022    SUMMARY OF HOSPITAL COURSE:   Patient was admitted through the emergency room following open right inguinal hernia repair (about 2 weeks prior to admission) with drainage from the medial aspect of the incision.  Cultures demonstrated gram-positive cocci.  Cultures from surgery also demonstrated gram-positive cocci, final culture pending.  Postoperatively, cutaneous erythema was resolving well and he was afebrile and felt good and very much wished to go home.  He was discharged with prescription for Bactrim and instructions for wet-to-dry normal saline dressing changes twice daily.  Follow-up with me in 1 week.    DIET: Regular    ACTIVITY: Walking encouraged, no lifting or strenuous activity    MEDICATIONS: Refer to MAR    FOLLOW-UP: To call office and schedule 1 week follow-up appointment    Anton Mueller M.D.

## 2022-03-02 NOTE — PAYOR COMM NOTE
"Daria Barreto (51 y.o. Male)     PLEASE SEE ATTACHED DC SUMMARY    REF#[id number]    THANK YOU    ZULEIKA LEVIN LPN CCP            Date of Birth Social Security Number Address Home Phone MRN    [id number]  [id number] [id number] [phone] [id number]    Judaism Marital Status             Shinto        Admission Date Admission Type Admitting Provider Attending Provider Department, Room/Bed    2/28/22 Emergency Anton Mueller MD  [address], [address]/1    Discharge Date Discharge Disposition Discharge Destination          3/2/2022 Home or Self Care              Attending Provider: (none)   Allergies: Penicillins    Isolation: None   Infection: MRSA (03/02/22)   Code Status: CPR   Advance Care Planning Activity    Ht: 190.5 cm (75\")   Wt: 139 kg (307 lb)    Admission Cmt: None   Principal Problem: None                Active Insurance as of 2/28/2022     Primary Coverage     Payor Plan Insurance Group Employer/Plan Group    [employer] BLUE [employer]OSS [facility] [employer] BLUE SHIELD PPO [id number]     Payor Plan Address Payor Plan Phone Number Payor Plan Fax Number Effective Dates    [address] [phone]  9/20/2015 - None Entered    [facility] [id number]       Subscriber Name Subscriber Birth Date Member ID       DARIA BARRETO 1970 [id number]                 Emergency Contacts      (Rel.) Home Phone Work Phone Mobile Phone    Emily Barreto (Spouse) [phone] -- [phone]               Discharge Summary      Anton Mueller MD at 03/02/22 1106        DATE OF ADMIT: 2/28/2022    DATE OF DISCHARGE: 3/2/2022    DIAGNOSIS: Postop seroma and wound infection    PROCEDURES: Incision and drainage of infected right inguinal seroma 3/1/2022    SUMMARY OF HOSPITAL COURSE:   Patient was admitted through the emergency room following open right inguinal hernia repair (about 2 weeks prior to admission) with drainage from the medial aspect of the " incision.  Cultures demonstrated gram-positive cocci.  Cultures from surgery also demonstrated gram-positive cocci, final culture pending.  Postoperatively, cutaneous erythema was resolving well and he was afebrile and felt good and very much wished to go home.  He was discharged with prescription for Bactrim and instructions for wet-to-dry normal saline dressing changes twice daily.  Follow-up with me in 1 week.    DIET: Regular    ACTIVITY: Walking encouraged, no lifting or strenuous activity    MEDICATIONS: Refer to MAR    FOLLOW-UP: To call office and schedule 1 week follow-up appointment    Anton Mueller M.D.    Electronically signed by Anton Mueller MD at 03/02/22 1108       Discharge Order (From admission, onward)     Start     Ordered    03/02/22 1104  Discharge patient  Once        Expected Discharge Date: 03/02/22    Discharge Disposition: Home or Self Care    Physician of Record for Attribution - Please select from Treatment Team: ANTON MUELLER [[id number]]    Review needed by CMO to determine Physician of Record: No       Question Answer Comment   Physician of Record for Attribution - Please select from Treatment Team ANTON MUELLER    Review needed by CMO to determine Physician of Record No        03/02/22 [id number]

## 2022-03-02 NOTE — PLAN OF CARE
Goal Outcome Evaluation: patient being discharged today when Dr. Mueller gets here.  Patient very anxious about discharge and says that he feels like he is being left in the dark.  RA and VSS. Denies pain at this time.  Dressing to right groin in place. WCTM.

## 2022-03-02 NOTE — PLAN OF CARE
Goal Outcome Evaluation:         Patient given one Norco 5mg for pain at beginning of shift. No complaints of pain since that time. IV abx continued. VSS. Patient refuses insulin, stating he doesn't take it at home. Advised him to hold off on snacks/drinks for the evening so his glucose can decrease. He has been compliant with this.  Patient has been diaphoretic at times and this loosened his surgical dressing. He got up to walk to the bathroom, it fell off, and the packing came out of the wound. No wound orders at this time. Nurse spoke with Dr. Go who said to repack the wound with betadyne soaked gauze and cover with abd pad. Dressing change completed.

## 2022-03-02 NOTE — PLAN OF CARE
Goal Outcome Evaluation:              Outcome Summary: Pt underwent surgery today w/ Dr. Mueller. Pt returned to the floor A & O x4. Pt denies pain and is resting comforatbly. Ice pack applied to incision. Surgical dressing C/D/I.  Continue to monitor.

## 2022-03-02 NOTE — DISCHARGE INSTRUCTIONS
1.  Wet-to-dry normal saline dressing changes twice daily (patient indicates his wife can do this)  2.  May shower over open aspect of wound  3.  My office will call him with follow-up appointment in time

## 2022-03-02 NOTE — TELEPHONE ENCOUNTER
----- Message from Anton Mueller MD sent at 3/2/2022  3:15 PM EST -----  Please let him know that cultures came back and he does not need to continue Keflex, just continue the Bactrim

## 2022-03-03 LAB
BACTERIA SPEC AEROBE CULT: ABNORMAL
GRAM STN SPEC: ABNORMAL
GRAM STN SPEC: ABNORMAL

## 2022-03-03 NOTE — CASE MANAGEMENT/SOCIAL WORK
Case Management Discharge Note      Final Note: DC'd home. Lisette Tobias RN         Selected Continued Care - Discharged on 3/2/2022 Admission date: 2/28/2022 - Discharge disposition: Home or Self Care    Destination    No services have been selected for the patient.              Durable Medical Equipment    No services have been selected for the patient.              Dialysis/Infusion    No services have been selected for the patient.              Home Medical Care    No services have been selected for the patient.              Therapy    No services have been selected for the patient.              Community Resources    No services have been selected for the patient.              Community & DME    No services have been selected for the patient.                  Transportation Services  Private: Car    Final Discharge Disposition Code: 01 - home or self-care

## 2022-03-05 LAB
BACTERIA SPEC AEROBE CULT: NORMAL
BACTERIA SPEC AEROBE CULT: NORMAL

## 2022-03-06 LAB — BACTERIA SPEC ANAEROBE CULT: NORMAL

## 2022-03-07 ENCOUNTER — OFFICE VISIT (OUTPATIENT)
Dept: SURGERY | Facility: CLINIC | Age: 52
End: 2022-03-07

## 2022-03-07 VITALS — HEIGHT: 75 IN | BODY MASS INDEX: 38.17 KG/M2 | WEIGHT: 307 LBS

## 2022-03-07 DIAGNOSIS — Z09 FOLLOW UP: Primary | ICD-10-CM

## 2022-03-07 PROCEDURE — 99024 POSTOP FOLLOW-UP VISIT: CPT | Performed by: SURGERY

## 2022-03-07 RX ORDER — SULFAMETHOXAZOLE AND TRIMETHOPRIM 800; 160 MG/1; MG/1
TABLET ORAL EVERY 12 HOURS
COMMUNITY
End: 2022-03-14

## 2022-03-07 RX ORDER — CEPHALEXIN 500 MG/1
CAPSULE ORAL
COMMUNITY
End: 2022-03-07

## 2022-03-07 RX ORDER — SULFAMETHOXAZOLE AND TRIMETHOPRIM 800; 160 MG/1; MG/1
1 TABLET ORAL 2 TIMES DAILY
Qty: 28 TABLET | Refills: 0 | Status: SHIPPED | OUTPATIENT
Start: 2022-03-07 | End: 2022-03-21

## 2022-03-07 NOTE — PROGRESS NOTES
Mr. Barreto underwent open incarcerated right inguinal hernia repair with polypropylene mesh on 2/15/2022 for a very large incarcerated indirect right inguinal hernia.  Unfortunately, he presented with cellulitis and drainage on February 28 and was taken to surgery on March 1 for incision and drainage of infected inguinal seroma.    On examination today, erythema has completely resolved but significant induration remains in the region of the wound extending to the scrotum.  There is a small amount of purulent fluid in the wound but the wound itself is not particularly deep on probing and I placed a red rubber catheter and there was no significant undrained fluid in the deeper aspect of the wound.    His wound culture demonstrated light growth MRSA.  He has been on Bactrim since discharge from the hospital.  He and his wife have only been covering the wound and not packing it.  I gave him careful instructions on how to do wet-to-dry Betadine dressing changes twice a day (patient videotaped the process for his wife to have as a reference).  He is to continue Bactrim and see me back in 1 week.  I would like him to stay on Bactrim until the wound has healed completely.

## 2022-03-14 ENCOUNTER — OFFICE VISIT (OUTPATIENT)
Dept: SURGERY | Facility: CLINIC | Age: 52
End: 2022-03-14

## 2022-03-14 DIAGNOSIS — Z09 FOLLOW UP: Primary | ICD-10-CM

## 2022-03-14 PROCEDURE — 99024 POSTOP FOLLOW-UP VISIT: CPT | Performed by: SURGERY

## 2022-03-14 RX ORDER — SULFAMETHOXAZOLE AND TRIMETHOPRIM 800; 160 MG/1; MG/1
1 TABLET ORAL 2 TIMES DAILY
Qty: 42 TABLET | Refills: 0 | Status: SHIPPED | OUTPATIENT
Start: 2022-03-14 | End: 2022-04-04

## 2022-03-14 NOTE — PROGRESS NOTES
His incision has healed except for the medial opening.  Cellulitis has resolved and induration is markedly improved.  There is no drainage from the wound.  His wife is doing a good job with the dressing changes.  He is to continue dressing changes as is and I have told him to stay on the Bactrim until his follow-up visit here in 3 weeks.

## 2022-04-04 ENCOUNTER — OFFICE VISIT (OUTPATIENT)
Dept: SURGERY | Facility: CLINIC | Age: 52
End: 2022-04-04

## 2022-04-04 VITALS — HEIGHT: 75 IN | BODY MASS INDEX: 38.17 KG/M2 | WEIGHT: 307 LBS

## 2022-04-04 DIAGNOSIS — Z48.89 POSTOPERATIVE VISIT: Primary | ICD-10-CM

## 2022-04-04 PROCEDURE — 99024 POSTOP FOLLOW-UP VISIT: CPT | Performed by: SURGERY

## 2024-10-15 ENCOUNTER — LAB (OUTPATIENT)
Facility: HOSPITAL | Age: 54
End: 2024-10-15
Payer: COMMERCIAL

## 2024-10-15 ENCOUNTER — TELEPHONE (OUTPATIENT)
Age: 54
End: 2024-10-15
Payer: COMMERCIAL

## 2024-10-15 DIAGNOSIS — E11.69 TYPE 2 DIABETES MELLITUS WITH OTHER SPECIFIED COMPLICATION, WITHOUT LONG-TERM CURRENT USE OF INSULIN: Primary | ICD-10-CM

## 2024-10-15 LAB — HBA1C MFR BLD: 6.2 % (ref 4.8–5.6)

## 2024-10-15 PROCEDURE — 83036 HEMOGLOBIN GLYCOSYLATED A1C: CPT | Performed by: FAMILY MEDICINE

## 2024-10-15 PROCEDURE — 36415 COLL VENOUS BLD VENIPUNCTURE: CPT | Performed by: FAMILY MEDICINE

## 2024-10-15 NOTE — TELEPHONE ENCOUNTER
Pt walked into office stating his work sent him home due to him using the restroom to many times. Pt states he is having incontinence and is needing documentation to give to his employer so he is able to go back to work. Can we put an A1c order for pt to take to [facility] to get drawn as well. Please advise.

## 2025-02-17 ENCOUNTER — HOSPITAL ENCOUNTER (EMERGENCY)
Facility: HOSPITAL | Age: 55
Discharge: HOME | End: 2025-02-17
Attending: STUDENT IN AN ORGANIZED HEALTH CARE EDUCATION/TRAINING PROGRAM | Admitting: STUDENT IN AN ORGANIZED HEALTH CARE EDUCATION/TRAINING PROGRAM
Payer: COMMERCIAL

## 2025-02-17 ENCOUNTER — APPOINTMENT (OUTPATIENT)
Dept: CT IMAGING | Facility: HOSPITAL | Age: 55
End: 2025-02-17
Payer: COMMERCIAL

## 2025-02-17 VITALS
RESPIRATION RATE: 16 BRPM | OXYGEN SATURATION: 96 % | SYSTOLIC BLOOD PRESSURE: 111 MMHG | TEMPERATURE: 97.2 F | DIASTOLIC BLOOD PRESSURE: 82 MMHG | HEART RATE: 111 BPM

## 2025-02-17 DIAGNOSIS — S30.1XXA CONTUSION OF ABDOMINAL WALL, INITIAL ENCOUNTER: Primary | ICD-10-CM

## 2025-02-17 DIAGNOSIS — M94.0 COSTOCHONDRITIS, ACUTE: ICD-10-CM

## 2025-02-17 LAB
ALBUMIN SERPL-MCNC: 3.5 G/DL (ref 3.5–5.2)
ALBUMIN/GLOB SERPL: 1 G/DL
ALP SERPL-CCNC: 70 U/L (ref 39–117)
ALT SERPL W P-5'-P-CCNC: 96 U/L (ref 1–41)
ANION GAP SERPL CALCULATED.3IONS-SCNC: 9.3 MMOL/L (ref 5–15)
APTT PPP: 26.5 SECONDS (ref 22.7–35.4)
AST SERPL-CCNC: 63 U/L (ref 1–40)
BASOPHILS # BLD AUTO: 0.03 10*3/MM3 (ref 0–0.2)
BASOPHILS NFR BLD AUTO: 0.5 % (ref 0–1.5)
BILIRUB SERPL-MCNC: 0.8 MG/DL (ref 0–1.2)
BUN SERPL-MCNC: 9 MG/DL (ref 6–20)
BUN/CREAT SERPL: 12.9 (ref 7–25)
CALCIUM SPEC-SCNC: 8.8 MG/DL (ref 8.6–10.5)
CHLORIDE SERPL-SCNC: 102 MMOL/L (ref 98–107)
CO2 SERPL-SCNC: 28.7 MMOL/L (ref 22–29)
CREAT SERPL-MCNC: 0.7 MG/DL (ref 0.76–1.27)
DEPRECATED RDW RBC AUTO: 40.7 FL (ref 37–54)
EGFRCR SERPLBLD CKD-EPI 2021: 108.8 ML/MIN/1.73
EOSINOPHIL # BLD AUTO: 0.2 10*3/MM3 (ref 0–0.4)
EOSINOPHIL NFR BLD AUTO: 3.1 % (ref 0.3–6.2)
ERYTHROCYTE [DISTWIDTH] IN BLOOD BY AUTOMATED COUNT: 13.3 % (ref 12.3–15.4)
GLOBULIN UR ELPH-MCNC: 3.6 GM/DL
GLUCOSE SERPL-MCNC: 113 MG/DL (ref 65–99)
HCT VFR BLD AUTO: 46 % (ref 37.5–51)
HGB BLD-MCNC: 15.3 G/DL (ref 13–17.7)
IMM GRANULOCYTES # BLD AUTO: 0.03 10*3/MM3 (ref 0–0.05)
IMM GRANULOCYTES NFR BLD AUTO: 0.5 % (ref 0–0.5)
INR PPP: 0.98 (ref 0.9–1.1)
LYMPHOCYTES # BLD AUTO: 2.56 10*3/MM3 (ref 0.7–3.1)
LYMPHOCYTES NFR BLD AUTO: 39.4 % (ref 19.6–45.3)
MCH RBC QN AUTO: 28 PG (ref 26.6–33)
MCHC RBC AUTO-ENTMCNC: 33.3 G/DL (ref 31.5–35.7)
MCV RBC AUTO: 84.2 FL (ref 79–97)
MONOCYTES # BLD AUTO: 0.79 10*3/MM3 (ref 0.1–0.9)
MONOCYTES NFR BLD AUTO: 12.2 % (ref 5–12)
NEUTROPHILS NFR BLD AUTO: 2.88 10*3/MM3 (ref 1.7–7)
NEUTROPHILS NFR BLD AUTO: 44.3 % (ref 42.7–76)
NRBC BLD AUTO-RTO: 0 /100 WBC (ref 0–0.2)
PLATELET # BLD AUTO: 488 10*3/MM3 (ref 140–450)
PMV BLD AUTO: 8.7 FL (ref 6–12)
POTASSIUM SERPL-SCNC: 3.6 MMOL/L (ref 3.5–5.2)
PROT SERPL-MCNC: 7.1 G/DL (ref 6–8.5)
PROTHROMBIN TIME: 13.1 SECONDS (ref 11.7–14.2)
RBC # BLD AUTO: 5.46 10*6/MM3 (ref 4.14–5.8)
SODIUM SERPL-SCNC: 140 MMOL/L (ref 136–145)
WBC NRBC COR # BLD AUTO: 6.49 10*3/MM3 (ref 3.4–10.8)

## 2025-02-17 PROCEDURE — 85730 THROMBOPLASTIN TIME PARTIAL: CPT | Performed by: STUDENT IN AN ORGANIZED HEALTH CARE EDUCATION/TRAINING PROGRAM

## 2025-02-17 PROCEDURE — 25510000001 IOPAMIDOL 61 % SOLUTION: Performed by: STUDENT IN AN ORGANIZED HEALTH CARE EDUCATION/TRAINING PROGRAM

## 2025-02-17 PROCEDURE — 85610 PROTHROMBIN TIME: CPT | Performed by: STUDENT IN AN ORGANIZED HEALTH CARE EDUCATION/TRAINING PROGRAM

## 2025-02-17 PROCEDURE — 85025 COMPLETE CBC W/AUTO DIFF WBC: CPT | Performed by: STUDENT IN AN ORGANIZED HEALTH CARE EDUCATION/TRAINING PROGRAM

## 2025-02-17 PROCEDURE — 80053 COMPREHEN METABOLIC PANEL: CPT | Performed by: STUDENT IN AN ORGANIZED HEALTH CARE EDUCATION/TRAINING PROGRAM

## 2025-02-17 PROCEDURE — 74177 CT ABD & PELVIS W/CONTRAST: CPT

## 2025-02-17 PROCEDURE — 99285 EMERGENCY DEPT VISIT HI MDM: CPT

## 2025-02-17 RX ORDER — IOPAMIDOL 612 MG/ML
100 INJECTION, SOLUTION INTRAVASCULAR
Status: COMPLETED | OUTPATIENT
Start: 2025-02-17 | End: 2025-02-17

## 2025-02-17 RX ADMIN — IOPAMIDOL 85 ML: 612 INJECTION, SOLUTION INTRAVENOUS at 10:59

## 2025-02-17 NOTE — ED NOTES
Patient c/o right sided abd bruising since 2/13/25. He reports it started after a coughing episode.

## 2025-02-17 NOTE — ED PROVIDER NOTES
EMERGENCY DEPARTMENT ENCOUNTER  Room Number:  30/30  PCP: Bigg Hassan MD  Independent Historians: Patient      HPI:  Chief Complaint: had concerns including Bleeding/Bruising.     A complete HPI/ROS/PMH/PSH/SH/FH are unobtainable due to: None    Chronic or social conditions impacting patient care (Social Determinants of Health): None      Context: Guevara Barreto is a 54 y.o. male with a medical history of GERD, cardiomegaly, who presents to the ED c/o acute bruising to his right flank and low abdomen.  States that he became sick 11 days ago and had to take 2 days off of work due to cough and illness.  He has had a mild persistent cough since, although his illness has improved.  He was in [place].  He was diving for the past several days.  While he was in [place], 3 days ago he noted small amount of bruising to his right flank.  It is gradually progressed.  No pain or history of trauma.  No fever chills or severe urinary symptoms.  He flew back yesterday.    He does not take blood thinners    Review of prior external notes (non-ED) -and- Review of prior external test results outside of this encounter:  I reviewed urgent care note from today.  No blood work obtained.  Sent to the ED for further imaging evaluation.    Prescription drug monitoring program review:         PAST MEDICAL HISTORY  Active Ambulatory Problems     Diagnosis Date Noted    Gastroesophageal reflux disease without esophagitis 05/13/2021    Anterior epistaxis 05/13/2021    Cardiomegaly 05/13/2021    Chronic cough 05/13/2021    Essential hypertension 12/20/2016    Generalized anxiety disorder 05/13/2021    Impaired fasting glucose 05/29/2018    Left ventricular hypertrophy 05/13/2021    Mild major depression 05/13/2021    Seasonal allergic rhinitis 05/29/2018    Right inguinal hernia 01/04/2022    Wound dehiscence, surgical 02/28/2022     Resolved Ambulatory Problems     Diagnosis Date Noted    No Resolved Ambulatory Problems     Past Medical  History:   Diagnosis Date    At risk for cardiovascular event     At risk for sleep apnea     Diabetes mellitus     GERD (gastroesophageal reflux disease)     Groin rash     Hypertension     Painful urination     Scrotal swelling     Type 2 diabetes mellitus          PAST SURGICAL HISTORY  Past Surgical History:   Procedure Laterality Date    AMPUTATION Left 2003    MIDDLE TOE    COLONOSCOPY  01/2020    Pt reports 2 polyps, repeat in 10 years, [facility]    INCISION AND DRAINAGE TRUNK N/A 03/01/2022    Procedure: RIGHT INGUINAL WOUND EXPLORATION AND DRAINAGE;  Surgeon: Anton Mueller MD;  Location: [facility] MAIN OR;  Service: General;  Laterality: N/A;    INGUINAL HERNIA REPAIR Right 02/15/2022    Procedure: OPEN RIGHT INGUINAL HERNIA REPAIR WITH MESH;  Surgeon: Anton Mueller MD;  Location: [facility] MAIN OR;  Service: General;  Laterality: Right;    MANDIBLE FRACTURE SURGERY      WISDOM TOOTH EXTRACTION           FAMILY HISTORY  Family History   Problem Relation Age of Onset    Hypertension Father     Arthritis Father     Cancer Father     Prostate cancer Father     Prostate cancer Paternal Grandfather     Colon cancer Other     Prostate cancer Other     Malig Hyperthermia Neg Hx          SOCIAL HISTORY  Social History     Socioeconomic History    Marital status:    Tobacco Use    Smoking status: Never    Smokeless tobacco: Never   Vaping Use    Vaping status: Never Used   Substance and Sexual Activity    Alcohol use: Yes     Comment: 1 WINE COOLER AT NIGHT    Drug use: Never    Sexual activity: Defer         ALLERGIES  Penicillins      REVIEW OF SYSTEMS  Review of Systems  Included in HPI  All systems reviewed and negative except for those discussed in HPI.      PHYSICAL EXAM    I have reviewed the triage vital signs and nursing notes.    ED Triage Vitals [02/17/25 0842]   Temp Heart Rate Resp BP SpO2   97.2 °F (36.2 °C) 111 16 -- 96 %      Temp src Heart Rate Source Patient Position BP  Location FiO2 (%)   Tympanic Monitor -- -- --       Physical Exam  GENERAL: alert, no acute distress  SKIN: Warm, dry, large ecchymosis to right flank extending the right lower quadrant  HENT: Normocephalic, atraumatic  EYES: no scleral icterus  CV: regular rhythm, regular rate  RESPIRATORY: normal effort, lungs clear  ABDOMEN: soft, nontender, nondistended  MUSCULOSKELETAL: no deformity  NEURO: alert, moves all extremities, follows commands            LAB RESULTS  Recent Results (from the past 24 hours)   Comprehensive Metabolic Panel    Collection Time: 02/17/25  9:28 AM    Specimen: Blood   Result Value Ref Range    Glucose 113 (H) 65 - 99 mg/dL    BUN 9 6 - 20 mg/dL    Creatinine 0.70 (L) 0.76 - 1.27 mg/dL    Sodium 140 136 - 145 mmol/L    Potassium 3.6 3.5 - 5.2 mmol/L    Chloride 102 98 - 107 mmol/L    CO2 28.7 22.0 - 29.0 mmol/L    Calcium 8.8 8.6 - 10.5 mg/dL    Total Protein 7.1 6.0 - 8.5 g/dL    Albumin 3.5 3.5 - 5.2 g/dL    ALT (SGPT) 96 (H) 1 - 41 U/L    AST (SGOT) 63 (H) 1 - 40 U/L    Alkaline Phosphatase 70 39 - 117 U/L    Total Bilirubin 0.8 0.0 - 1.2 mg/dL    Globulin 3.6 gm/dL    A/G Ratio 1.0 g/dL    BUN/Creatinine Ratio 12.9 7.0 - 25.0    Anion Gap 9.3 5.0 - 15.0 mmol/L    eGFR 108.8 >60.0 mL/min/1.73   aPTT    Collection Time: 02/17/25  9:28 AM    Specimen: Blood   Result Value Ref Range    PTT 26.5 22.7 - 35.4 seconds   Protime-INR    Collection Time: 02/17/25  9:28 AM    Specimen: Blood   Result Value Ref Range    Protime 13.1 11.7 - 14.2 Seconds    INR 0.98 0.90 - 1.10   CBC Auto Differential    Collection Time: 02/17/25  9:28 AM    Specimen: Blood   Result Value Ref Range    WBC 6.49 3.40 - 10.80 10*3/mm3    RBC 5.46 4.14 - 5.80 10*6/mm3    Hemoglobin 15.3 13.0 - 17.7 g/dL    Hematocrit 46.0 37.5 - 51.0 %    MCV 84.2 79.0 - 97.0 fL    MCH 28.0 26.6 - 33.0 pg    MCHC 33.3 31.5 - 35.7 g/dL    RDW 13.3 12.3 - 15.4 %    RDW-SD 40.7 37.0 - 54.0 fl    MPV 8.7 6.0 - 12.0 fL    Platelets 488 (H) 140  - 450 10*3/mm3    Neutrophil % 44.3 42.7 - 76.0 %    Lymphocyte % 39.4 19.6 - 45.3 %    Monocyte % 12.2 (H) 5.0 - 12.0 %    Eosinophil % 3.1 0.3 - 6.2 %    Basophil % 0.5 0.0 - 1.5 %    Immature Grans % 0.5 0.0 - 0.5 %    Neutrophils, Absolute 2.88 1.70 - 7.00 10*3/mm3    Lymphocytes, Absolute 2.56 0.70 - 3.10 10*3/mm3    Monocytes, Absolute 0.79 0.10 - 0.90 10*3/mm3    Eosinophils, Absolute 0.20 0.00 - 0.40 10*3/mm3    Basophils, Absolute 0.03 0.00 - 0.20 10*3/mm3    Immature Grans, Absolute 0.03 0.00 - 0.05 10*3/mm3    nRBC 0.0 0.0 - 0.2 /100 WBC         RADIOLOGY  CT Abdomen Pelvis With Contrast    Result Date: 2/17/2025  CT ABDOMEN AND PELVIS WITH IV CONTRAST  HISTORY: 54-year-old male with bruising at the right flank and abdominal wall. No trauma or anticoagulants.  TECHNIQUE: Radiation dose reduction techniques were utilized, including automated exposure control and exposure modulation based on body size. 3 mm images were obtained through the abdomen and pelvis after the administration of IV contrast. No priors for comparison.  FINDINGS: 1. There is a very small right pleural effusion. There is significant asymmetric thickening of the right lateral abdominal wall musculature and there is overlying subcutaneous stranding and thickening of the skin. These findings span approximately 27 cm in craniocaudad span. There is no subcutaneous or intramuscular fluid collection. The appearance can be seen with blunt trauma and anticoagulants. The skin and subcutaneous findings can be seen with cellulitis.  2. There is mild fatty filtration of the liver. Gallbladder appears unremarkable. Pancreas, spleen, adrenals, and kidneys appear unremarkable. There is no acute bowel abnormality. There is no colitis or appendicitis. No free fluid or lymphadenopathy. No significant abdominal aortic atherosclerotic changes.           MEDICATIONS GIVEN IN ER  Medications   iopamidol (ISOVUE-300) 61 % injection 100 mL (85 mL Intravenous  Given 2/17/25 1059)         ORDERS PLACED DURING THIS VISIT:  Orders Placed This Encounter   Procedures    CT Abdomen Pelvis With Contrast    Comprehensive Metabolic Panel    aPTT    Protime-INR    CBC Auto Differential    CBC & Differential         OUTPATIENT MEDICATION MANAGEMENT:  No current Epic-ordered facility-administered medications on file.     Current Outpatient Medications Ordered in Epic   Medication Sig Dispense Refill    Ascorbic Acid (VITAMIN C PO) Take 1 tablet by mouth Every Night. PT TAKES 500 MG NIGHTLY      Cholecalciferol (VITAMIN D-3 PO) Take 1 tablet by mouth Every Night. Unsure of dosage      Dapagliflozin Propanediol (Farxiga) 10 MG tablet Take 10 mg by mouth Daily.      Multiple Vitamins-Minerals (ZINC PO) Take 1 tablet by mouth Every Night.      omeprazole (priLOSEC) 40 MG capsule Take 40 mg by mouth Every Night.      Semaglutide (Rybelsus) 3 MG tablet Take 3 mg by mouth Daily.      telmisartan (MICARDIS) 40 MG tablet Take 40 mg by mouth Every Night.           PROCEDURES  Procedures            PROGRESS, DATA ANALYSIS, CONSULTS, AND MEDICAL DECISION MAKING  All labs have been independently interpreted by me.  All radiology studies have been reviewed by me. All EKG's have been independently viewed and interpreted by me.  Discussion below represents my analysis of pertinent findings related to patient's condition, differential diagnosis, treatment plan and final disposition.    Differential diagnosis includes but is not limited to spontaneous hematoma, venous bleed, retroperitoneal hematoma, decompression illness, occult trauma, coagulopathy.    Clinical Scores:                                       ED Course as of 02/17/25 1251   Mon Feb 17, 2025   0844 Patient presents ED for evaluation of bruising to his right flank and abdomen.  He states around 11 days ago he became sick with a cough and had to take 2 days off work.  He has had a persistent cough.  This past week he was in [facility] where  he was diving, and noted small amount of bruising to his right flank 3 days ago.  It has gradually progressed.  It is not painful, no trauma, no other symptoms.    On exam patient with significant ecchymosis from right flank extending to right lower quadrant.  He is tachycardic but nontoxic-appearing.  Abdomen is benign    Will obtain basic labs, coags, CT abdomen pelvis.  Patient is agreeable with plan [DN]   0947 Hemoglobin: 15.3 [DN]   0947 Platelets(!): 488 [DN]   1002 Creatinine(!): 0.70 [DN]   1031 INR: 0.98 [DN]   1145 CT Abdomen Pelvis With Contrast  I reviewed CT images, patient with stranding throughout right flank, no fluid collection, no free fluid in abdomen, interpreted by self  I reviewed radiologist interpretation of CT images [DN]   1248 I discussed results with patient, reassuring workup.  Suspect patient pulled a muscle leading to the costochondritis like symptoms and venous bleed with superficial ecchymosis.  He is agreeable plan for discharge home at this time [DN]      ED Course User Index  [DN] Gustavo Schmitz MD             AS OF 12:51 EST VITALS:    BP - 111/82  HR - 111  TEMP - 97.2 °F (36.2 °C) (Tympanic)  O2 SATS - 96%    COMPLEXITY OF CARE  Admission was considered but after careful review of the patient's presentation, physical examination, diagnostic results, and response to treatment the patient may be safely discharged with outpatient follow-up.      DIAGNOSIS  Final diagnoses:   Contusion of abdominal wall, initial encounter   Costochondritis, acute         DISPOSITION  ED Disposition       ED Disposition   Discharge    Condition   Stable    Comment   --                Please note that portions of this document were completed with a voice recognition program.    Note Disclaimer: At [facility], we believe that sharing information builds trust and better relationships. You are receiving this note because you recently visited [facility]. It is possible you will see [facility]  information before a provider has talked with you about it. This kind of information can be easy to misunderstand. To help you fully understand what it means for your health, we urge you to discuss this note with your provider.         Gustavo Schmitz MD  02/17/25 0856       Gustavo Schmitz MD  02/17/25 3312

## 2025-02-19 ENCOUNTER — APPOINTMENT (OUTPATIENT)
Dept: CT IMAGING | Facility: HOSPITAL | Age: 55
DRG: 205 | End: 2025-02-19
Payer: COMMERCIAL

## 2025-02-19 ENCOUNTER — HOSPITAL ENCOUNTER (INPATIENT)
Facility: HOSPITAL | Age: 55
LOS: 2 days | Discharge: HOME | DRG: 205 | End: 2025-02-22
Attending: EMERGENCY MEDICINE | Admitting: INTERNAL MEDICINE
Payer: COMMERCIAL

## 2025-02-19 DIAGNOSIS — S22.31XA CLOSED FRACTURE OF ONE RIB OF RIGHT SIDE, INITIAL ENCOUNTER: Primary | ICD-10-CM

## 2025-02-19 DIAGNOSIS — J94.2 HEMOTHORAX ON RIGHT: ICD-10-CM

## 2025-02-19 PROBLEM — S22.39XA CLOSED FRACTURE OF ONE RIB: Status: ACTIVE | Noted: 2025-02-19

## 2025-02-19 PROBLEM — E11.9 DIABETES: Status: ACTIVE | Noted: 2025-02-19

## 2025-02-19 LAB
ALBUMIN SERPL-MCNC: 3.1 G/DL (ref 3.5–5.2)
ALBUMIN/GLOB SERPL: 0.8 G/DL
ALP SERPL-CCNC: 70 U/L (ref 39–117)
ALT SERPL W P-5'-P-CCNC: 69 U/L (ref 1–41)
ANION GAP SERPL CALCULATED.3IONS-SCNC: 12.8 MMOL/L (ref 5–15)
APTT PPP: 28.3 SECONDS (ref 22.7–35.4)
AST SERPL-CCNC: 36 U/L (ref 1–40)
B PARAPERT DNA SPEC QL NAA+PROBE: NOT DETECTED
B PERT DNA SPEC QL NAA+PROBE: NOT DETECTED
BASOPHILS # BLD AUTO: 0.03 10*3/MM3 (ref 0–0.2)
BASOPHILS NFR BLD AUTO: 0.3 % (ref 0–1.5)
BILIRUB SERPL-MCNC: 0.6 MG/DL (ref 0–1.2)
BUN SERPL-MCNC: 9 MG/DL (ref 6–20)
BUN/CREAT SERPL: 12.7 (ref 7–25)
C PNEUM DNA NPH QL NAA+NON-PROBE: NOT DETECTED
CALCIUM SPEC-SCNC: 8.9 MG/DL (ref 8.6–10.5)
CHLORIDE SERPL-SCNC: 99 MMOL/L (ref 98–107)
CO2 SERPL-SCNC: 24.2 MMOL/L (ref 22–29)
CREAT SERPL-MCNC: 0.71 MG/DL (ref 0.76–1.27)
DEPRECATED RDW RBC AUTO: 41.4 FL (ref 37–54)
EGFRCR SERPLBLD CKD-EPI 2021: 108.4 ML/MIN/1.73
EOSINOPHIL # BLD AUTO: 0.03 10*3/MM3 (ref 0–0.4)
EOSINOPHIL NFR BLD AUTO: 0.3 % (ref 0.3–6.2)
ERYTHROCYTE [DISTWIDTH] IN BLOOD BY AUTOMATED COUNT: 13.5 % (ref 12.3–15.4)
FLUAV SUBTYP SPEC NAA+PROBE: NOT DETECTED
FLUBV RNA ISLT QL NAA+PROBE: NOT DETECTED
GEN 5 1HR TROPONIN T REFLEX: 8 NG/L
GLOBULIN UR ELPH-MCNC: 3.9 GM/DL
GLUCOSE BLDC GLUCOMTR-MCNC: 121 MG/DL (ref 70–130)
GLUCOSE BLDC GLUCOMTR-MCNC: 129 MG/DL (ref 70–130)
GLUCOSE SERPL-MCNC: 154 MG/DL (ref 65–99)
HADV DNA SPEC NAA+PROBE: NOT DETECTED
HCOV 229E RNA SPEC QL NAA+PROBE: NOT DETECTED
HCOV HKU1 RNA SPEC QL NAA+PROBE: NOT DETECTED
HCOV NL63 RNA SPEC QL NAA+PROBE: NOT DETECTED
HCOV OC43 RNA SPEC QL NAA+PROBE: NOT DETECTED
HCT VFR BLD AUTO: 43.5 % (ref 37.5–51)
HGB BLD-MCNC: 14.2 G/DL (ref 13–17.7)
HMPV RNA NPH QL NAA+NON-PROBE: NOT DETECTED
HPIV1 RNA ISLT QL NAA+PROBE: NOT DETECTED
HPIV2 RNA SPEC QL NAA+PROBE: NOT DETECTED
HPIV3 RNA NPH QL NAA+PROBE: NOT DETECTED
HPIV4 P GENE NPH QL NAA+PROBE: NOT DETECTED
IMM GRANULOCYTES # BLD AUTO: 0.04 10*3/MM3 (ref 0–0.05)
IMM GRANULOCYTES NFR BLD AUTO: 0.4 % (ref 0–0.5)
INR PPP: 1.06 (ref 0.9–1.1)
LYMPHOCYTES # BLD AUTO: 1.64 10*3/MM3 (ref 0.7–3.1)
LYMPHOCYTES NFR BLD AUTO: 15.3 % (ref 19.6–45.3)
M PNEUMO IGG SER IA-ACNC: NOT DETECTED
MAGNESIUM SERPL-MCNC: 2.2 MG/DL (ref 1.6–2.6)
MCH RBC QN AUTO: 28.1 PG (ref 26.6–33)
MCHC RBC AUTO-ENTMCNC: 32.6 G/DL (ref 31.5–35.7)
MCV RBC AUTO: 86 FL (ref 79–97)
MONOCYTES # BLD AUTO: 0.82 10*3/MM3 (ref 0.1–0.9)
MONOCYTES NFR BLD AUTO: 7.7 % (ref 5–12)
NEUTROPHILS NFR BLD AUTO: 76 % (ref 42.7–76)
NEUTROPHILS NFR BLD AUTO: 8.13 10*3/MM3 (ref 1.7–7)
NRBC BLD AUTO-RTO: 0 /100 WBC (ref 0–0.2)
NT-PROBNP SERPL-MCNC: <36 PG/ML (ref 0–900)
PLATELET # BLD AUTO: 525 10*3/MM3 (ref 140–450)
PMV BLD AUTO: 8.9 FL (ref 6–12)
POTASSIUM SERPL-SCNC: 3.4 MMOL/L (ref 3.5–5.2)
PROT SERPL-MCNC: 7 G/DL (ref 6–8.5)
PROTHROMBIN TIME: 13.7 SECONDS (ref 11.7–14.2)
QT INTERVAL: 345 MS
QTC INTERVAL: 425 MS
RBC # BLD AUTO: 5.06 10*6/MM3 (ref 4.14–5.8)
RHINOVIRUS RNA SPEC NAA+PROBE: NOT DETECTED
RSV RNA NPH QL NAA+NON-PROBE: NOT DETECTED
SARS-COV-2 RNA NPH QL NAA+NON-PROBE: NOT DETECTED
SODIUM SERPL-SCNC: 136 MMOL/L (ref 136–145)
TROPONIN T NUMERIC DELTA: -1 NG/L
TROPONIN T SERPL HS-MCNC: 9 NG/L
WBC NRBC COR # BLD AUTO: 10.69 10*3/MM3 (ref 3.4–10.8)

## 2025-02-19 PROCEDURE — 25010000002 HYDROMORPHONE PER 4 MG: Performed by: INTERNAL MEDICINE

## 2025-02-19 PROCEDURE — 36415 COLL VENOUS BLD VENIPUNCTURE: CPT

## 2025-02-19 PROCEDURE — 94799 UNLISTED PULMONARY SVC/PX: CPT

## 2025-02-19 PROCEDURE — 94640 AIRWAY INHALATION TREATMENT: CPT

## 2025-02-19 PROCEDURE — 93010 ELECTROCARDIOGRAM REPORT: CPT | Performed by: STUDENT IN AN ORGANIZED HEALTH CARE EDUCATION/TRAINING PROGRAM

## 2025-02-19 PROCEDURE — 93005 ELECTROCARDIOGRAM TRACING: CPT | Performed by: PHYSICIAN ASSISTANT

## 2025-02-19 PROCEDURE — 83880 ASSAY OF NATRIURETIC PEPTIDE: CPT | Performed by: PHYSICIAN ASSISTANT

## 2025-02-19 PROCEDURE — 94761 N-INVAS EAR/PLS OXIMETRY MLT: CPT

## 2025-02-19 PROCEDURE — 25510000001 IOPAMIDOL PER 1 ML: Performed by: EMERGENCY MEDICINE

## 2025-02-19 PROCEDURE — 0202U NFCT DS 22 TRGT SARS-COV-2: CPT | Performed by: PHYSICIAN ASSISTANT

## 2025-02-19 PROCEDURE — 99205 OFFICE O/P NEW HI 60 MIN: CPT

## 2025-02-19 PROCEDURE — G0378 HOSPITAL OBSERVATION PER HR: HCPCS

## 2025-02-19 PROCEDURE — 82948 REAGENT STRIP/BLOOD GLUCOSE: CPT

## 2025-02-19 PROCEDURE — 80053 COMPREHEN METABOLIC PANEL: CPT | Performed by: PHYSICIAN ASSISTANT

## 2025-02-19 PROCEDURE — 25010000002 ONDANSETRON PER 1 MG: Performed by: PHYSICIAN ASSISTANT

## 2025-02-19 PROCEDURE — 76377 3D RENDER W/INTRP POSTPROCES: CPT

## 2025-02-19 PROCEDURE — 99285 EMERGENCY DEPT VISIT HI MDM: CPT

## 2025-02-19 PROCEDURE — 85730 THROMBOPLASTIN TIME PARTIAL: CPT | Performed by: PHYSICIAN ASSISTANT

## 2025-02-19 PROCEDURE — 85610 PROTHROMBIN TIME: CPT | Performed by: PHYSICIAN ASSISTANT

## 2025-02-19 PROCEDURE — 85025 COMPLETE CBC W/AUTO DIFF WBC: CPT | Performed by: PHYSICIAN ASSISTANT

## 2025-02-19 PROCEDURE — 84484 ASSAY OF TROPONIN QUANT: CPT | Performed by: PHYSICIAN ASSISTANT

## 2025-02-19 PROCEDURE — 25010000002 HYDROMORPHONE PER 4 MG: Performed by: EMERGENCY MEDICINE

## 2025-02-19 PROCEDURE — 94664 DEMO&/EVAL PT USE INHALER: CPT

## 2025-02-19 PROCEDURE — 71275 CT ANGIOGRAPHY CHEST: CPT

## 2025-02-19 PROCEDURE — 74177 CT ABD & PELVIS W/CONTRAST: CPT

## 2025-02-19 PROCEDURE — 83735 ASSAY OF MAGNESIUM: CPT | Performed by: INTERNAL MEDICINE

## 2025-02-19 RX ORDER — DIAZEPAM 2 MG/1
2 TABLET ORAL EVERY 6 HOURS PRN
Status: DISCONTINUED | OUTPATIENT
Start: 2025-02-19 | End: 2025-02-22 | Stop reason: HOSPADM

## 2025-02-19 RX ORDER — ONDANSETRON 2 MG/ML
4 INJECTION INTRAMUSCULAR; INTRAVENOUS EVERY 6 HOURS PRN
Status: DISCONTINUED | OUTPATIENT
Start: 2025-02-19 | End: 2025-02-22 | Stop reason: HOSPADM

## 2025-02-19 RX ORDER — PANTOPRAZOLE SODIUM 40 MG/1
40 TABLET, DELAYED RELEASE ORAL
Status: DISCONTINUED | OUTPATIENT
Start: 2025-02-20 | End: 2025-02-22 | Stop reason: HOSPADM

## 2025-02-19 RX ORDER — IPRATROPIUM BROMIDE AND ALBUTEROL SULFATE 2.5; .5 MG/3ML; MG/3ML
3 SOLUTION RESPIRATORY (INHALATION)
Status: DISCONTINUED | OUTPATIENT
Start: 2025-02-19 | End: 2025-02-22 | Stop reason: HOSPADM

## 2025-02-19 RX ORDER — ACETAMINOPHEN 500 MG
1000 TABLET ORAL 3 TIMES DAILY
Status: DISCONTINUED | OUTPATIENT
Start: 2025-02-19 | End: 2025-02-22 | Stop reason: HOSPADM

## 2025-02-19 RX ORDER — GUAIFENESIN 600 MG/1
1200 TABLET, EXTENDED RELEASE ORAL EVERY 12 HOURS SCHEDULED
Status: DISCONTINUED | OUTPATIENT
Start: 2025-02-19 | End: 2025-02-22 | Stop reason: HOSPADM

## 2025-02-19 RX ORDER — SODIUM CHLORIDE 0.9 % (FLUSH) 0.9 %
10 SYRINGE (ML) INJECTION AS NEEDED
Status: DISCONTINUED | OUTPATIENT
Start: 2025-02-19 | End: 2025-02-22 | Stop reason: HOSPADM

## 2025-02-19 RX ORDER — LIDOCAINE 4 G/G
1 PATCH TOPICAL
Status: DISCONTINUED | OUTPATIENT
Start: 2025-02-19 | End: 2025-02-22 | Stop reason: HOSPADM

## 2025-02-19 RX ORDER — GABAPENTIN 300 MG/1
300 CAPSULE ORAL 3 TIMES DAILY
Status: DISCONTINUED | OUTPATIENT
Start: 2025-02-19 | End: 2025-02-22 | Stop reason: HOSPADM

## 2025-02-19 RX ORDER — ACETAMINOPHEN 650 MG/1
650 SUPPOSITORY RECTAL EVERY 4 HOURS PRN
Status: DISCONTINUED | OUTPATIENT
Start: 2025-02-19 | End: 2025-02-22 | Stop reason: HOSPADM

## 2025-02-19 RX ORDER — HYDROMORPHONE HYDROCHLORIDE 1 MG/ML
0.5 INJECTION, SOLUTION INTRAMUSCULAR; INTRAVENOUS; SUBCUTANEOUS ONCE
Status: COMPLETED | OUTPATIENT
Start: 2025-02-19 | End: 2025-02-19

## 2025-02-19 RX ORDER — CALCIUM CARBONATE 500 MG/1
2 TABLET, CHEWABLE ORAL 2 TIMES DAILY PRN
Status: DISCONTINUED | OUTPATIENT
Start: 2025-02-19 | End: 2025-02-22 | Stop reason: HOSPADM

## 2025-02-19 RX ORDER — BISACODYL 5 MG/1
5 TABLET, DELAYED RELEASE ORAL DAILY PRN
Status: DISCONTINUED | OUTPATIENT
Start: 2025-02-19 | End: 2025-02-22 | Stop reason: HOSPADM

## 2025-02-19 RX ORDER — BISACODYL 10 MG
10 SUPPOSITORY, RECTAL RECTAL DAILY PRN
Status: DISCONTINUED | OUTPATIENT
Start: 2025-02-19 | End: 2025-02-22 | Stop reason: HOSPADM

## 2025-02-19 RX ORDER — HYDROMORPHONE HYDROCHLORIDE 1 MG/ML
0.5 INJECTION, SOLUTION INTRAMUSCULAR; INTRAVENOUS; SUBCUTANEOUS
Status: DISCONTINUED | OUTPATIENT
Start: 2025-02-19 | End: 2025-02-22 | Stop reason: HOSPADM

## 2025-02-19 RX ORDER — OXYCODONE HYDROCHLORIDE 5 MG/1
5 TABLET ORAL EVERY 4 HOURS PRN
Status: DISCONTINUED | OUTPATIENT
Start: 2025-02-19 | End: 2025-02-22 | Stop reason: HOSPADM

## 2025-02-19 RX ORDER — SODIUM CHLORIDE 9 MG/ML
40 INJECTION, SOLUTION INTRAVENOUS AS NEEDED
Status: DISCONTINUED | OUTPATIENT
Start: 2025-02-19 | End: 2025-02-22 | Stop reason: HOSPADM

## 2025-02-19 RX ORDER — ACETAMINOPHEN 160 MG/5ML
650 SOLUTION ORAL EVERY 4 HOURS PRN
Status: DISCONTINUED | OUTPATIENT
Start: 2025-02-19 | End: 2025-02-22 | Stop reason: HOSPADM

## 2025-02-19 RX ORDER — LOSARTAN POTASSIUM 50 MG/1
50 TABLET ORAL NIGHTLY
Status: DISCONTINUED | OUTPATIENT
Start: 2025-02-19 | End: 2025-02-22 | Stop reason: HOSPADM

## 2025-02-19 RX ORDER — ACETAMINOPHEN 325 MG/1
650 TABLET ORAL EVERY 4 HOURS PRN
Status: DISCONTINUED | OUTPATIENT
Start: 2025-02-19 | End: 2025-02-22 | Stop reason: HOSPADM

## 2025-02-19 RX ORDER — IOPAMIDOL 755 MG/ML
100 INJECTION, SOLUTION INTRAVASCULAR
Status: COMPLETED | OUTPATIENT
Start: 2025-02-19 | End: 2025-02-19

## 2025-02-19 RX ORDER — DEXTROSE MONOHYDRATE 25 G/50ML
25 INJECTION, SOLUTION INTRAVENOUS
Status: DISCONTINUED | OUTPATIENT
Start: 2025-02-19 | End: 2025-02-22 | Stop reason: HOSPADM

## 2025-02-19 RX ORDER — INSULIN LISPRO 100 [IU]/ML
2-9 INJECTION, SOLUTION INTRAVENOUS; SUBCUTANEOUS
Status: DISCONTINUED | OUTPATIENT
Start: 2025-02-19 | End: 2025-02-22 | Stop reason: HOSPADM

## 2025-02-19 RX ORDER — POLYETHYLENE GLYCOL 3350 17 G/17G
17 POWDER, FOR SOLUTION ORAL DAILY PRN
Status: DISCONTINUED | OUTPATIENT
Start: 2025-02-19 | End: 2025-02-22 | Stop reason: HOSPADM

## 2025-02-19 RX ORDER — ONDANSETRON 4 MG/1
4 TABLET, ORALLY DISINTEGRATING ORAL EVERY 6 HOURS PRN
Status: DISCONTINUED | OUTPATIENT
Start: 2025-02-19 | End: 2025-02-22 | Stop reason: HOSPADM

## 2025-02-19 RX ORDER — IBUPROFEN 600 MG/1
1 TABLET ORAL
Status: DISCONTINUED | OUTPATIENT
Start: 2025-02-19 | End: 2025-02-22 | Stop reason: HOSPADM

## 2025-02-19 RX ORDER — NUTRITIONAL SUPPLEMENT
1 BAR ORAL DAILY
Status: DISCONTINUED | OUTPATIENT
Start: 2025-02-19 | End: 2025-02-22 | Stop reason: HOSPADM

## 2025-02-19 RX ORDER — NICOTINE POLACRILEX 4 MG
15 LOZENGE BUCCAL
Status: DISCONTINUED | OUTPATIENT
Start: 2025-02-19 | End: 2025-02-22 | Stop reason: HOSPADM

## 2025-02-19 RX ORDER — NITROGLYCERIN 0.4 MG/1
0.4 TABLET SUBLINGUAL
Status: DISCONTINUED | OUTPATIENT
Start: 2025-02-19 | End: 2025-02-22 | Stop reason: HOSPADM

## 2025-02-19 RX ORDER — ONDANSETRON 2 MG/ML
4 INJECTION INTRAMUSCULAR; INTRAVENOUS ONCE
Status: COMPLETED | OUTPATIENT
Start: 2025-02-19 | End: 2025-02-19

## 2025-02-19 RX ORDER — SODIUM CHLORIDE 0.9 % (FLUSH) 0.9 %
10 SYRINGE (ML) INJECTION EVERY 12 HOURS SCHEDULED
Status: DISCONTINUED | OUTPATIENT
Start: 2025-02-19 | End: 2025-02-22 | Stop reason: HOSPADM

## 2025-02-19 RX ORDER — AMOXICILLIN 250 MG
2 CAPSULE ORAL 2 TIMES DAILY PRN
Status: DISCONTINUED | OUTPATIENT
Start: 2025-02-19 | End: 2025-02-22 | Stop reason: HOSPADM

## 2025-02-19 RX ADMIN — ACETAMINOPHEN 1000 MG: 500 TABLET, FILM COATED ORAL at 17:05

## 2025-02-19 RX ADMIN — HYDROMORPHONE HYDROCHLORIDE 0.5 MG: 1 INJECTION, SOLUTION INTRAMUSCULAR; INTRAVENOUS; SUBCUTANEOUS at 17:04

## 2025-02-19 RX ADMIN — GABAPENTIN 300 MG: 300 CAPSULE ORAL at 21:01

## 2025-02-19 RX ADMIN — IPRATROPIUM BROMIDE AND ALBUTEROL SULFATE 3 ML: .5; 3 SOLUTION RESPIRATORY (INHALATION) at 21:44

## 2025-02-19 RX ADMIN — IOPAMIDOL 95 ML: 755 INJECTION, SOLUTION INTRAVENOUS at 12:22

## 2025-02-19 RX ADMIN — IPRATROPIUM BROMIDE AND ALBUTEROL SULFATE 3 ML: .5; 3 SOLUTION RESPIRATORY (INHALATION) at 15:02

## 2025-02-19 RX ADMIN — HYDROMORPHONE HYDROCHLORIDE 0.5 MG: 1 INJECTION, SOLUTION INTRAMUSCULAR; INTRAVENOUS; SUBCUTANEOUS at 11:00

## 2025-02-19 RX ADMIN — ACETAMINOPHEN 1000 MG: 500 TABLET, FILM COATED ORAL at 21:01

## 2025-02-19 RX ADMIN — Medication 10 ML: at 21:01

## 2025-02-19 RX ADMIN — GABAPENTIN 300 MG: 300 CAPSULE ORAL at 17:05

## 2025-02-19 RX ADMIN — GUAIFENESIN 1200 MG: 600 TABLET, MULTILAYER, EXTENDED RELEASE ORAL at 21:01

## 2025-02-19 RX ADMIN — LOSARTAN POTASSIUM 50 MG: 50 TABLET, FILM COATED ORAL at 21:01

## 2025-02-19 RX ADMIN — GUAIFENESIN 1200 MG: 600 TABLET, MULTILAYER, EXTENDED RELEASE ORAL at 17:04

## 2025-02-19 RX ADMIN — LIDOCAINE 1 PATCH: 4 PATCH TOPICAL at 17:04

## 2025-02-19 RX ADMIN — Medication 1 EACH: at 21:47

## 2025-02-19 RX ADMIN — ONDANSETRON 4 MG: 2 INJECTION, SOLUTION INTRAMUSCULAR; INTRAVENOUS at 10:58

## 2025-02-19 NOTE — CONSULTS
"    General MD Inpatient Consult  Consult performed by: Juliann Bedoya PA-C  Consult ordered by: Lisette Long PA-C          Patient Care Team:  Bigg Hassan MD as PCP - General  Bigg Hassan MD as PCP - Family Medicine    Chief Complaint   Patient presents with    Abdominal Pain    Rib Pain       Subjective     History of Present Illness  Mr. Barreto is a 54-year-old male with a past medical history of DM, cardiomegaly and GERD who presents to ED with acute bruising along his right lower abdomen and flank. He became ill over a week ago while he was traveling in [facility], where he was scuba diving for several days. He developed a cough, and a few days ago he noticed bruising to his right flank. This bruising continued to progress over the next two days before he travelled back to the . Two days ago he had a coughing episode when he heard a \"pop\" followed by pain to his right side. Initial workup in ED included CT angiogram and CT abdomen/pelvis. He has a displaced fracture of the posterolateral right eighth rib,small to moderate right pleural effusion suspicious for hemothorax, and adjacent heterogeneous collection suspicious for hematoma. No pulmonary embolism. He denies any trauma to his flank or chest. Persist cough.  Review of Systems   Constitutional:  Positive for fatigue. Negative for chills, fever and unexpected weight change.   Respiratory:  Positive for cough and wheezing. Negative for apnea.    Cardiovascular: Negative.    Gastrointestinal: Negative.    Skin:  Positive for color change and wound.        Skin discoloration along right side of lower abdomen   Neurological: Negative.    Psychiatric/Behavioral: Negative.          Past Medical History:   Diagnosis Date    Anterior epistaxis     At risk for cardiovascular event     At risk for sleep apnea     STOP BANG - 5    Cardiomegaly     Chronic cough     Diabetes 02/19/2025    Diabetes mellitus     Generalized anxiety disorder     GERD " (gastroesophageal reflux disease)     Groin rash     Hypertension     Left ventricular hypertrophy     Mild major depression     Painful urination     Right inguinal hernia     Scrotal swelling     Seasonal allergic rhinitis     Type 2 diabetes mellitus      Past Surgical History:   Procedure Laterality Date    AMPUTATION Left 2003    MIDDLE TOE    COLONOSCOPY  01/2020    Pt reports 2 polyps, repeat in 10 years, [facility]    INCISION AND DRAINAGE TRUNK N/A 03/01/2022    Procedure: RIGHT INGUINAL WOUND EXPLORATION AND DRAINAGE;  Surgeon: Anton Mueller MD;  Location: [facility] MAIN OR;  Service: General;  Laterality: N/A;    INGUINAL HERNIA REPAIR Right 02/15/2022    Procedure: OPEN RIGHT INGUINAL HERNIA REPAIR WITH MESH;  Surgeon: Anton Mueller MD;  Location: [facility] MAIN OR;  Service: General;  Laterality: Right;    MANDIBLE FRACTURE SURGERY      WISDOM TOOTH EXTRACTION       Family History   Problem Relation Age of Onset    Hypertension Father     Arthritis Father     Cancer Father     Prostate cancer Father     Prostate cancer Paternal Grandfather     Colon cancer Other     Prostate cancer Other     Malig Hyperthermia Neg Hx      Social History     Socioeconomic History    Marital status:    Tobacco Use    Smoking status: Never    Smokeless tobacco: Never   Vaping Use    Vaping status: Never Used   Substance and Sexual Activity    Alcohol use: Yes     Comment: 1 WINE COOLER AT NIGHT    Drug use: Never    Sexual activity: Defer     (Not in a hospital admission)    Allergies   Allergen Reactions    Penicillins Hives and Rash       Objective      Vital Signs  Temp:  [97.9 °F (36.6 °C)] 97.9 °F (36.6 °C)  Heart Rate:  [91-98] 92  Resp:  [18] 18  BP: (129-152)/(74-89) 142/85    Intake & Output (last day)       None            Physical Exam  Constitutional:       Comments: Uncomfortable appearing   HENT:      Head: Normocephalic.   Eyes:      General: No scleral icterus.      Conjunctiva/sclera: Conjunctivae normal.   Cardiovascular:      Rate and Rhythm: Regular rhythm. Tachycardia present.   Pulmonary:      Effort: Pulmonary effort is normal. No respiratory distress.      Comments: Intermittent cough. Right sided chest pain with exertion    Chest:      Chest wall: Tenderness present.   Abdominal:      Palpations: Abdomen is soft.   Skin:     General: Skin is warm and dry.      Findings: Bruising present.   Neurological:      Mental Status: He is alert and oriented to person, place, and time.   Psychiatric:         Behavior: Behavior normal.         Thought Content: Thought content normal.         Judgment: Judgment normal.         Results Review:    I reviewed the patient's new clinical results.  Discussed with patient, consulting provider    Imaging Results (Last 24 Hours)       Procedure Component Value Units Date/Time    CT Angiogram Chest Pulmonary Embolism [[id number]] Collected: 02/19/25 1252     Updated: 02/19/25 1315    Narrative:      CT ANGIOGRAM CHEST PULMONARY EMBOLISM-, CT ABDOMEN PELVIS W CONTRAST-     DATE OF EXAM: 2/19/2025 12:11 PM     INDICATION: right anterior lateral and inferior rib pain, shortness of  breath. Right-sided abdominal pain and bruising     COMPARISON: CT abdomen pelvis 2/17/2025.     TECHNIQUE: Multiple contiguous axial images were acquired through the  chest, abdomen, and pelvis following the intravenous administration of  95 mL of Isovue-370. Reformatted coronal, sagittal, and 3D  reconstruction images were also reviewed. Radiation dose reduction  techniques were utilized, including automated exposure control and  exposure modulation based on body size.     FINDINGS:  CTA chest:  The study is mild limited by suboptimal contrast bolus timing. No  evidence of a filling defect in the main or proximal segmental pulmonary  arterial branches to suggest pulmonary embolism. Evaluation of the more  distal segmental and subsegmental pulmonary arterial branches  is  limited. Dense contrast remains in the right axillary, subclavian, and  brachiocephalic veins and the SVC. The heart and great vessels of the  chest are normal in size. No evidence of calcified coronary artery  disease. Trace pericardial fluid. No pathologically enlarged  intrathoracic lymph nodes are identified. Partially imaged enlarged  heterogeneous multinodular left lobe of the thyroid gland with  calcification. Tiny hiatal hernia.     Enlarged heterogeneous small to moderate right pleural effusion, likely  hemothorax, with associated compressive atelectasis of the right lower  lobe. Patchy groundglass opacities in the dependent and basilar right  lower lobe could represent atelectasis and/or pulmonary contusion. The  left lung is clear. Trace retained secretions in the right interlobar  bronchus.     Displaced fracture of the posterolateral right eighth rib. Adjacent  heterogeneous family high attenuation collection in the posterolateral  right chest wall, measuring approximately 7.5 cm x 2 cm (axial series 1  image 97), probable hematoma. Additional enlarged predominately high  attenuation collection in the posterolateral right chest wall between  the right eighth and ninth ribs, now measuring 8 cm x 2.5 cm (axial  series 1 image 114), also likely representing a hematoma. Mild fat  stranding in the right chest wall.     CT abdomen and pelvis:  Stable incompletely evaluated tiny subcentimeter low-density lesion in  the left lobe of the liver, probably benign cyst/hemangioma. The  gallbladder, spleen, pancreas, adrenal glands, and kidneys are  unremarkable. High density contents of the otherwise unremarkable  appearing urinary bladder could represent hematuria or contrast.     Mild colorectal stool. No bowel obstruction or significant bowel wall  thickening. The appendix is normal.     No free fluid in the abdomen or pelvis. No free intraperitoneal air. No  pathologically enlarged lymph nodes in the abdomen  or pelvis.     Mild stranding in the subcutaneous fat of the anterolateral right  abdominal wall. Postoperative changes in the right inguinal region with  surgical clips in place. Mild to moderate bilateral hip and SI joint DJD  with bilateral SI joint bridging osteophyte formation. Chronic appearing  bilateral L5 pars defects with associated minimal grade 1  anterolisthesis of L5 on S1 and mild multilevel lumbar spondylosis. No  acute osseous abnormality or concerning osseous lesion       Impression:         1. The study is negative for pulmonary embolism in the main or proximal  segmental pulmonary arterial branches. Evaluation of the more distal  segmental and subsegmental pulmonary arterial branches is limited no  evidence of right heart strain.  2. Displaced fracture of the posterolateral right eighth rib with high  attenuation collections in the posterolateral lower right chest wall  that likely represent hematomas.  3. Enlarged heterogeneous small to moderate right pleural effusion,  likely hemothorax, with associated compressive atelectasis of the right  lower lobe.  4. Patchy groundglass opacities in the dependent and basilar right lower  lobe could represent atelectasis or pulmonary contusion.  5. Mild stranding in the subcutaneous fat of the anterior and lateral  right abdominal wall.     This report was finalized on 2/19/2025 1:12 PM by Luis Bryson MD on  Workstation: [id number]       CT Abdomen Pelvis With Contrast [[id number]] Collected: 02/19/25 1252     Updated: 02/19/25 1315    Narrative:      CT ANGIOGRAM CHEST PULMONARY EMBOLISM-, CT ABDOMEN PELVIS W CONTRAST-     DATE OF EXAM: 2/19/2025 12:11 PM     INDICATION: right anterior lateral and inferior rib pain, shortness of  breath. Right-sided abdominal pain and bruising     COMPARISON: CT abdomen pelvis 2/17/2025.     TECHNIQUE: Multiple contiguous axial images were acquired through the  chest, abdomen, and pelvis following the intravenous  administration of  95 mL of Isovue-370. Reformatted coronal, sagittal, and 3D  reconstruction images were also reviewed. Radiation dose reduction  techniques were utilized, including automated exposure control and  exposure modulation based on body size.     FINDINGS:  CTA chest:  The study is mild limited by suboptimal contrast bolus timing. No  evidence of a filling defect in the main or proximal segmental pulmonary  arterial branches to suggest pulmonary embolism. Evaluation of the more  distal segmental and subsegmental pulmonary arterial branches is  limited. Dense contrast remains in the right axillary, subclavian, and  brachiocephalic veins and the SVC. The heart and great vessels of the  chest are normal in size. No evidence of calcified coronary artery  disease. Trace pericardial fluid. No pathologically enlarged  intrathoracic lymph nodes are identified. Partially imaged enlarged  heterogeneous multinodular left lobe of the thyroid gland with  calcification. Tiny hiatal hernia.     Enlarged heterogeneous small to moderate right pleural effusion, likely  hemothorax, with associated compressive atelectasis of the right lower  lobe. Patchy groundglass opacities in the dependent and basilar right  lower lobe could represent atelectasis and/or pulmonary contusion. The  left lung is clear. Trace retained secretions in the right interlobar  bronchus.     Displaced fracture of the posterolateral right eighth rib. Adjacent  heterogeneous family high attenuation collection in the posterolateral  right chest wall, measuring approximately 7.5 cm x 2 cm (axial series 1  image 97), probable hematoma. Additional enlarged predominately high  attenuation collection in the posterolateral right chest wall between  the right eighth and ninth ribs, now measuring 8 cm x 2.5 cm (axial  series 1 image 114), also likely representing a hematoma. Mild fat  stranding in the right chest wall.     CT abdomen and pelvis:  Stable  incompletely evaluated tiny subcentimeter low-density lesion in  the left lobe of the liver, probably benign cyst/hemangioma. The  gallbladder, spleen, pancreas, adrenal glands, and kidneys are  unremarkable. High density contents of the otherwise unremarkable  appearing urinary bladder could represent hematuria or contrast.     Mild colorectal stool. No bowel obstruction or significant bowel wall  thickening. The appendix is normal.     No free fluid in the abdomen or pelvis. No free intraperitoneal air. No  pathologically enlarged lymph nodes in the abdomen or pelvis.     Mild stranding in the subcutaneous fat of the anterolateral right  abdominal wall. Postoperative changes in the right inguinal region with  surgical clips in place. Mild to moderate bilateral hip and SI joint DJD  with bilateral SI joint bridging osteophyte formation. Chronic appearing  bilateral L5 pars defects with associated minimal grade 1  anterolisthesis of L5 on S1 and mild multilevel lumbar spondylosis. No  acute osseous abnormality or concerning osseous lesion       Impression:         1. The study is negative for pulmonary embolism in the main or proximal  segmental pulmonary arterial branches. Evaluation of the more distal  segmental and subsegmental pulmonary arterial branches is limited no  evidence of right heart strain.  2. Displaced fracture of the posterolateral right eighth rib with high  attenuation collections in the posterolateral lower right chest wall  that likely represent hematomas.  3. Enlarged heterogeneous small to moderate right pleural effusion,  likely hemothorax, with associated compressive atelectasis of the right  lower lobe.  4. Patchy groundglass opacities in the dependent and basilar right lower  lobe could represent atelectasis or pulmonary contusion.  5. Mild stranding in the subcutaneous fat of the anterior and lateral  right abdominal wall.     This report was finalized on 2/19/2025 1:12 PM by Luis Bryson  MD on  Workstation: [id number]               Lab Results:  Lab Results (last 24 hours)       Procedure Component Value Units Date/Time    Respiratory Panel PCR w/COVID-19(SARS-CoV-2) JL/LEONIDAS/CAREN/PAD/COR/CHRISTINA In-House, NP Swab in UTM/VTM, 2 HR TAT - Swab, Nasopharynx [[id number]]  (Normal) Collected: 02/19/25 1419    Specimen: Swab from Nasopharynx Updated: 02/19/25 1516     ADENOVIRUS, PCR Not Detected     Coronavirus 229E Not Detected     Coronavirus HKU1 Not Detected     Coronavirus NL63 Not Detected     Coronavirus OC43 Not Detected     COVID19 Not Detected     Human Metapneumovirus Not Detected     Human Rhinovirus/Enterovirus Not Detected     Influenza A PCR Not Detected     Influenza B PCR Not Detected     Parainfluenza Virus 1 Not Detected     Parainfluenza Virus 2 Not Detected     Parainfluenza Virus 3 Not Detected     Parainfluenza Virus 4 Not Detected     RSV, PCR Not Detected     Bordetella pertussis pcr Not Detected     Bordetella parapertussis PCR Not Detected     Chlamydophila pneumoniae PCR Not Detected     Mycoplasma pneumo by PCR Not Detected    Narrative:      In the setting of a positive respiratory panel with a viral infection PLUS a negative procalcitonin without other underlying concern for bacterial infection, consider observing off antibiotics or discontinuation of antibiotics and continue supportive care. If the respiratory panel is positive for atypical bacterial infection (Bordetella pertussis, Chlamydophila pneumoniae, or Mycoplasma pneumoniae), consider antibiotic de-escalation to target atypical bacterial infection.    High Sensitivity Troponin T 1Hr [[id number]]  (Normal) Collected: 02/19/25 1242    Specimen: Blood from Arm, Left Updated: 02/19/25 1309     HS Troponin T 8 ng/L      Troponin T Numeric Delta -1 ng/L     Narrative:      High Sensitive Troponin T Reference Range:  <14.0 ng/L- Negative Female for AMI  <22.0 ng/L- Negative Male for AMI  >=14 - Abnormal Female indicating  possible myocardial injury.  >=22 - Abnormal Male indicating possible myocardial injury.   Clinicians would have to utilize clinical acumen, EKG, Troponin, and serial changes to determine if it is an Acute Myocardial Infarction or myocardial injury due to an underlying chronic condition.         Comprehensive Metabolic Panel [[id number]]  (Abnormal) Collected: 02/19/25 1107    Specimen: Blood Updated: 02/19/25 1200     Glucose 154 mg/dL      BUN 9 mg/dL      Creatinine 0.71 mg/dL      Sodium 136 mmol/L      Potassium 3.4 mmol/L      Chloride 99 mmol/L      CO2 24.2 mmol/L      Calcium 8.9 mg/dL      Total Protein 7.0 g/dL      Albumin 3.1 g/dL      ALT (SGPT) 69 U/L      AST (SGOT) 36 U/L      Alkaline Phosphatase 70 U/L      Total Bilirubin 0.6 mg/dL      Globulin 3.9 gm/dL      A/G Ratio 0.8 g/dL      BUN/Creatinine Ratio 12.7     Anion Gap 12.8 mmol/L      eGFR 108.4 mL/min/1.73     Narrative:      GFR Categories in Chronic Kidney Disease (CKD)      GFR Category          GFR (mL/min/1.73)    Interpretation  G1                     90 or greater         Normal or high (1)  G2                      60-89                Mild decrease (1)  G3a                   45-59                Mild to moderate decrease  G3b                   30-44                Moderate to severe decrease  G4                    15-29                Severe decrease  G5                    14 or less           Kidney failure          (1)In the absence of evidence of kidney disease, neither GFR category G1 or G2 fulfill the criteria for CKD.    eGFR calculation 2021 CKD-EPI creatinine equation, which does not include race as a factor    Protime-INR [[id number]]  (Normal) Collected: 02/19/25 1107    Specimen: Blood Updated: 02/19/25 1150     Protime 13.7 Seconds      INR 1.06    aPTT [[id number]]  (Normal) Collected: 02/19/25 1107    Specimen: Blood Updated: 02/19/25 1150     PTT 28.3 seconds     High Sensitivity Troponin T [[id number]]  (Normal)  Collected: 02/19/25 1107    Specimen: Blood Updated: 02/19/25 1149     HS Troponin T 9 ng/L     Narrative:      High Sensitive Troponin T Reference Range:  <14.0 ng/L- Negative Female for AMI  <22.0 ng/L- Negative Male for AMI  >=14 - Abnormal Female indicating possible myocardial injury.  >=22 - Abnormal Male indicating possible myocardial injury.   Clinicians would have to utilize clinical acumen, EKG, Troponin, and serial changes to determine if it is an Acute Myocardial Infarction or myocardial injury due to an underlying chronic condition.         BNP [[id number]]  (Normal) Collected: 02/19/25 1107    Specimen: Blood Updated: 02/19/25 1149     proBNP <36.0 pg/mL     Narrative:      This assay is used as an aid in the diagnosis of individuals suspected of having heart failure. It can be used as an aid in the diagnosis of acute decompensated heart failure (ADHF) in patients presenting with signs and symptoms of ADHF to the emergency department (ED). In addition, NT-proBNP of <300 pg/mL indicates ADHF is not likely.    Age Range Result Interpretation  NT-proBNP Concentration (pg/mL:      <50             Positive            >450                   Gray                 300-450                    Negative             <300    50-75           Positive            >900                  Gray                300-900                  Negative            <300      >75             Positive            >1800                  Gray                300-1800                  Negative            <300    CBC & Differential [[id number]]  (Abnormal) Collected: 02/19/25 1107    Specimen: Blood Updated: 02/19/25 1125    Narrative:      The following orders were created for panel order CBC & Differential.  Procedure                               Abnormality         Status                     ---------                               -----------         ------                     CBC Auto Differential[[id number]]        Abnormal            Final  result                 Please view results for these tests on the individual orders.    CBC Auto Differential [[id number]]  (Abnormal) Collected: 02/19/25 1107    Specimen: Blood Updated: 02/19/25 1125     WBC 10.69 10*3/mm3      RBC 5.06 10*6/mm3      Hemoglobin 14.2 g/dL      Hematocrit 43.5 %      MCV 86.0 fL      MCH 28.1 pg      MCHC 32.6 g/dL      RDW 13.5 %      RDW-SD 41.4 fl      MPV 8.9 fL      Platelets 525 10*3/mm3      Neutrophil % 76.0 %      Lymphocyte % 15.3 %      Monocyte % 7.7 %      Eosinophil % 0.3 %      Basophil % 0.3 %      Immature Grans % 0.4 %      Neutrophils, Absolute 8.13 10*3/mm3      Lymphocytes, Absolute 1.64 10*3/mm3      Monocytes, Absolute 0.82 10*3/mm3      Eosinophils, Absolute 0.03 10*3/mm3      Basophils, Absolute 0.03 10*3/mm3      Immature Grans, Absolute 0.04 10*3/mm3      nRBC 0.0 /100 WBC                 Assessment & Plan       Hemothorax on right    Essential hypertension    Diabetes      Assessment & Plan    Rib fracture protocol for pain management.  Breathing treatment, mucinex for cough and congestion.  Anesthesiology consulted for SANDRINE block. Will see if this alleviates his pain before determining possible surgical intervention.  Encourage ambulation and pulmonary toilet.  Repeat chest x-ray and labs in AM to monitor hemothorax.  Likely plan for CT guided chest tube in IR tomorrow.    I discussed the patient's findings and our recommendations with patient and consulting provider    Thank you for this consult and allowing us to participate in the care of your patient.  We will follow along with you during this hospitalization.       Juliann Bedoya PA-C  [facility]  02/19/25  15:20 EST    Greater than 65 minutes was spent reviewing the patient's chart, radiographic imaging, labs, provider notes, assessing the patient and developing a plan of care which was discussed with the patient/family and other providers.

## 2025-02-19 NOTE — ED PROVIDER NOTES
EMERGENCY DEPARTMENT ENCOUNTER  Room Number:  02/02  PCP: Bigg Hassan MD  Independent Historians: Patient      HPI:  Chief Complaint: had concerns including Abdominal Pain and Rib Pain.     A complete HPI/ROS/PMH/PSH/SH/FH are unobtainable due to: None    Chronic or social conditions impacting patient care (Social Determinants of Health): None      Context: The patient is a 54 y.o. male with a medical history of GERD, hypertension, generalized anxiety disorder who presents to the ED c/o acute right anterior inferior chest pain.  Started while coughing today, is severe anytime he coughs or moves, he also feels short of breath.  States that about 2 weeks ago while in [facility] he started developing a cough.  Also started developing some bruising to his right  flank.  The bruising advanced, he was in the ER couple days ago for evaluation of that.  He was not having this pain at the time.  He denies any fevers chills or any other chest pain, no trauma to the area.  No lower extremity edema, no fevers or chills.      Review of prior external notes (non-ED) -and- Review of prior external test results outside of this encounter:  Labs performed 2/17/2025 and creatinine was 0.7, hemoglobin 15.3, platelets 488.        PAST MEDICAL HISTORY  Active Ambulatory Problems     Diagnosis Date Noted    Gastroesophageal reflux disease without esophagitis 05/13/2021    Anterior epistaxis 05/13/2021    Cardiomegaly 05/13/2021    Chronic cough 05/13/2021    Essential hypertension 12/20/2016    Generalized anxiety disorder 05/13/2021    Impaired fasting glucose 05/29/2018    Left ventricular hypertrophy 05/13/2021    Mild major depression 05/13/2021    Seasonal allergic rhinitis 05/29/2018    Right inguinal hernia 01/04/2022    Wound dehiscence, surgical 02/28/2022     Resolved Ambulatory Problems     Diagnosis Date Noted    No Resolved Ambulatory Problems     Past Medical History:   Diagnosis Date    At risk for cardiovascular event      At risk for sleep apnea     Diabetes 02/19/2025    Diabetes mellitus     GERD (gastroesophageal reflux disease)     Groin rash     Hypertension     Painful urination     Scrotal swelling     Type 2 diabetes mellitus          PAST SURGICAL HISTORY  Past Surgical History:   Procedure Laterality Date    AMPUTATION Left 2003    MIDDLE TOE    COLONOSCOPY  01/2020    Pt reports 2 polyps, repeat in 10 years, [facility]    INCISION AND DRAINAGE TRUNK N/A 03/01/2022    Procedure: RIGHT INGUINAL WOUND EXPLORATION AND DRAINAGE;  Surgeon: Anton Mueller MD;  Location: [facility] MAIN OR;  Service: General;  Laterality: N/A;    INGUINAL HERNIA REPAIR Right 02/15/2022    Procedure: OPEN RIGHT INGUINAL HERNIA REPAIR WITH MESH;  Surgeon: Anton Mueller MD;  [facility] MA[facility] OR;  Service: General;  Laterality: Right;    MANDIBLE FRACTURE SURGERY      WISDOM TOOTH EXTRACTION           FAMILY HISTORY  Family History   Problem Relation Age of Onset    Hypertension Father     Arthritis Father     Cancer Father     Prostate cancer Father     Prostate cancer Paternal Grandfather     Colon cancer Other     Prostate cancer Other     Malig Hyperthermia Neg Hx          SOCIAL HISTORY  Social History     Socioeconomic History    Marital status:    Tobacco Use    Smoking status: Never    Smokeless tobacco: Never   Vaping Use    Vaping status: Never Used   Substance and Sexual Activity    Alcohol use: Yes     Comment: 1 WINE COOLER AT NIGHT    Drug use: Never    Sexual activity: Defer         ALLERGIES  Penicillins      REVIEW OF SYSTEMS  Review of Systems  Included in HPI  All systems reviewed and negative except for those discussed in HPI.      PHYSICAL EXAM    I have reviewed the triage vital signs and nursing notes.    ED Triage Vitals   Temp Heart Rate Resp BP SpO2   02/19/25 1022 02/19/25 1022 02/19/25 1022 02/19/25 1026 02/19/25 1022   97.9 °F (36.6 °C) 98 18 129/77 96 %      Temp src Heart Rate Source  Patient Position BP Location FiO2 (%)   02/19/25 1022 02/19/25 1022 02/19/25 1026 02/19/25 1026 --   Tympanic Monitor Standing Right arm        Physical Exam  GENERAL: alert, no acute distress  SKIN: Warm, dry  HENT: Normocephalic, atraumatic  EYES: no scleral icterus  CV: regular rhythm, regular rate  RESPIRATORY: normal effort, ,some scattered rhonchi, no wheezing.  There is a lot of tenderness along the  inferior right ribs anterior and lateral, no step-offs or crepitus  ABDOMEN: nondistended, soft nontender normal bowel sounds no guarding or rigidity.  There is scattered ecchymosis along the right flank and lower abdomen  MUSCULOSKELETAL: no deformity  NEURO: alert, moves all extremities, follows commands            LAB RESULTS  Recent Results (from the past 24 hours)   Comprehensive Metabolic Panel    Collection Time: 02/19/25 11:07 AM    Specimen: Blood   Result Value Ref Range    Glucose 154 (H) 65 - 99 mg/dL    BUN 9 6 - 20 mg/dL    Creatinine 0.71 (L) 0.76 - 1.27 mg/dL    Sodium 136 136 - 145 mmol/L    Potassium 3.4 (L) 3.5 - 5.2 mmol/L    Chloride 99 98 - 107 mmol/L    CO2 24.2 22.0 - 29.0 mmol/L    Calcium 8.9 8.6 - 10.5 mg/dL    Total Protein 7.0 6.0 - 8.5 g/dL    Albumin 3.1 (L) 3.5 - 5.2 g/dL    ALT (SGPT) 69 (H) 1 - 41 U/L    AST (SGOT) 36 1 - 40 U/L    Alkaline Phosphatase 70 39 - 117 U/L    Total Bilirubin 0.6 0.0 - 1.2 mg/dL    Globulin 3.9 gm/dL    A/G Ratio 0.8 g/dL    BUN/Creatinine Ratio 12.7 7.0 - 25.0    Anion Gap 12.8 5.0 - 15.0 mmol/L    eGFR 108.4 >60.0 mL/min/1.73   Protime-INR    Collection Time: 02/19/25 11:07 AM    Specimen: Blood   Result Value Ref Range    Protime 13.7 11.7 - 14.2 Seconds    INR 1.06 0.90 - 1.10   aPTT    Collection Time: 02/19/25 11:07 AM    Specimen: Blood   Result Value Ref Range    PTT 28.3 22.7 - 35.4 seconds   CBC Auto Differential    Collection Time: 02/19/25 11:07 AM    Specimen: Blood   Result Value Ref Range    WBC 10.69 3.40 - 10.80 10*3/mm3    RBC 5.06  4.14 - 5.80 10*6/mm3    Hemoglobin 14.2 13.0 - 17.7 g/dL    Hematocrit 43.5 37.5 - 51.0 %    MCV 86.0 79.0 - 97.0 fL    MCH 28.1 26.6 - 33.0 pg    MCHC 32.6 31.5 - 35.7 g/dL    RDW 13.5 12.3 - 15.4 %    RDW-SD 41.4 37.0 - 54.0 fl    MPV 8.9 6.0 - 12.0 fL    Platelets 525 (H) 140 - 450 10*3/mm3    Neutrophil % 76.0 42.7 - 76.0 %    Lymphocyte % 15.3 (L) 19.6 - 45.3 %    Monocyte % 7.7 5.0 - 12.0 %    Eosinophil % 0.3 0.3 - 6.2 %    Basophil % 0.3 0.0 - 1.5 %    Immature Grans % 0.4 0.0 - 0.5 %    Neutrophils, Absolute 8.13 (H) 1.70 - 7.00 10*3/mm3    Lymphocytes, Absolute 1.64 0.70 - 3.10 10*3/mm3    Monocytes, Absolute 0.82 0.10 - 0.90 10*3/mm3    Eosinophils, Absolute 0.03 0.00 - 0.40 10*3/mm3    Basophils, Absolute 0.03 0.00 - 0.20 10*3/mm3    Immature Grans, Absolute 0.04 0.00 - 0.05 10*3/mm3    nRBC 0.0 0.0 - 0.2 /100 WBC   High Sensitivity Troponin T    Collection Time: 02/19/25 11:07 AM    Specimen: Blood   Result Value Ref Range    HS Troponin T 9 <22 ng/L   BNP    Collection Time: 02/19/25 11:07 AM    Specimen: Blood   Result Value Ref Range    proBNP <36.0 0.0 - 900.0 pg/mL   ECG 12 Lead Chest Pain    Collection Time: 02/19/25 11:23 AM   Result Value Ref Range    QT Interval 345 ms    QTC Interval 425 ms   High Sensitivity Troponin T 1Hr    Collection Time: 02/19/25 12:42 PM    Specimen: Arm, Left; Blood   Result Value Ref Range    HS Troponin T 8 <22 ng/L    Troponin T Numeric Delta -1 Abnormal if >/=3 ng/L         RADIOLOGY  CT Angiogram Chest Pulmonary Embolism, CT Abdomen Pelvis With Contrast    Result Date: 2/19/2025  CT ANGIOGRAM CHEST PULMONARY EMBOLISM-, CT ABDOMEN PELVIS W CONTRAST-  DATE OF EXAM: 2/19/2025 12:11 PM  INDICATION: right anterior lateral and inferior rib pain, shortness of breath. Right-sided abdominal pain and bruising  COMPARISON: CT abdomen pelvis 2/17/2025.  TECHNIQUE: Multiple contiguous axial images were acquired through the chest, abdomen, and pelvis following the intravenous  administration of 95 mL of Isovue-370. Reformatted coronal, sagittal, and 3D reconstruction images were also reviewed. Radiation dose reduction techniques were utilized, including automated exposure control and exposure modulation based on body size.  FINDINGS: CTA chest: The study is mild limited by suboptimal contrast bolus timing. No evidence of a filling defect in the main or proximal segmental pulmonary arterial branches to suggest pulmonary embolism. Evaluation of the more distal segmental and subsegmental pulmonary arterial branches is limited. Dense contrast remains in the right axillary, subclavian, and brachiocephalic veins and the SVC. The heart and great vessels of the chest are normal in size. No evidence of calcified coronary artery disease. Trace pericardial fluid. No pathologically enlarged intrathoracic lymph nodes are identified. Partially imaged enlarged heterogeneous multinodular left lobe of the thyroid gland with calcification. Tiny hiatal hernia.  Enlarged heterogeneous small to moderate right pleural effusion, likely hemothorax, with associated compressive atelectasis of the right lower lobe. Patchy groundglass opacities in the dependent and basilar right lower lobe could represent atelectasis and/or pulmonary contusion. The left lung is clear. Trace retained secretions in the right interlobar bronchus.  Displaced fracture of the posterolateral right eighth rib. Adjacent heterogeneous family high attenuation collection in the posterolateral right chest wall, measuring approximately 7.5 cm x 2 cm (axial series 1 image 97), probable hematoma. Additional enlarged predominately high attenuation collection in the posterolateral right chest wall between the right eighth and ninth ribs, now measuring 8 cm x 2.5 cm (axial series 1 image 114), also likely representing a hematoma. Mild fat stranding in the right chest wall.  CT abdomen and pelvis: Stable incompletely evaluated tiny subcentimeter  low-density lesion in the left lobe of the liver, probably benign cyst/hemangioma. The gallbladder, spleen, pancreas, adrenal glands, and kidneys are unremarkable. High density contents of the otherwise unremarkable appearing urinary bladder could represent hematuria or contrast.  Mild colorectal stool. No bowel obstruction or significant bowel wall thickening. The appendix is normal.  No free fluid in the abdomen or pelvis. No free intraperitoneal air. No pathologically enlarged lymph nodes in the abdomen or pelvis.  Mild stranding in the subcutaneous fat of the anterolateral right abdominal wall. Postoperative changes in the right inguinal region with surgical clips in place. Mild to moderate bilateral hip and SI joint DJD with bilateral SI joint bridging osteophyte formation. Chronic appearing bilateral L5 pars defects with associated minimal grade 1 anterolisthesis of L5 on S1 and mild multilevel lumbar spondylosis. No acute osseous abnormality or concerning osseous lesion       1. The study is negative for pulmonary embolism in the main or proximal segmental pulmonary arterial branches. Evaluation of the more distal segmental and subsegmental pulmonary arterial branches is limited no evidence of right heart strain. 2. Displaced fracture of the posterolateral right eighth rib with high attenuation collections in the posterolateral lower right chest wall that likely represent hematomas. 3. Enlarged heterogeneous small to moderate right pleural effusion, likely hemothorax, with associated compressive atelectasis of the right lower lobe. 4. Patchy groundglass opacities in the dependent and basilar right lower lobe could represent atelectasis or pulmonary contusion. 5. Mild stranding in the subcutaneous fat of the anterior and lateral right abdominal wall.  This report was finalized on 2/19/2025 1:12 PM by Luis Bryson MD on Workstation: [id number]         MEDICATIONS GIVEN IN ER  Medications   HYDROmorphone  (DILAUDID) injection 0.5 mg (has no administration in time range)   gabapentin (NEURONTIN) capsule 300 mg (has no administration in time range)   Lidocaine 4 % 1 patch (has no administration in time range)   oxyCODONE (ROXICODONE) immediate release tablet 5 mg (has no administration in time range)   guaiFENesin (MUCINEX) 12 hr tablet 1,200 mg (has no administration in time range)   ipratropium-albuterol (DUO-NEB) nebulizer solution 3 mL (3 mL Nebulization Given 2/19/25 1502)   acetaminophen (TYLENOL) tablet 1,000 mg (has no administration in time range)   diazePAM (VALIUM) tablet 2 mg (has no administration in time range)   Pedialyte oral packet pack 1 each (has no administration in time range)   HYDROmorphone (DILAUDID) injection 0.5 mg (0.5 mg Intravenous Given 2/19/25 1100)   ondansetron (ZOFRAN) injection 4 mg (4 mg Intravenous Given 2/19/25 1058)   iopamidol (ISOVUE-370) 76 % injection 100 mL (95 mL Intravenous Given During Downtime 2/19/25 1222)         ORDERS PLACED DURING THIS VISIT:  Orders Placed This Encounter   Procedures    Respiratory Panel PCR w/COVID-19(SARS-CoV-2) JL/LEONIDAS/CAREN/PAD/COR/CHRISTINA In-House, NP Swab in UTM/VTM, 2 HR TAT - Swab, Nasopharynx    CT Angiogram Chest Pulmonary Embolism    CT Abdomen Pelvis With Contrast    XR Chest 1 View    Comprehensive Metabolic Panel    Protime-INR    aPTT    CBC Auto Differential    High Sensitivity Troponin T    BNP    High Sensitivity Troponin T 1Hr    Diet: Regular/House, Cardiac; Healthy Heart (2-3 Na+); Fluid Consistency: Thin (IDDSI 0)    Monitor Blood Pressure    Continuous Pulse Oximetry    IP General Consult (Use specialty-specific consult if known)    [facility] (on-call MD unless specified) Details    Inpatient Anesthesia Pain Management Consult    Incentive Spirometry    Oscillating Positive Expiratory Pressure (OPEP)    ECG 12 Lead Chest Pain    Initiate Observation Status    CBC & Differential         OUTPATIENT MEDICATION MANAGEMENT:  Current  Facility-Administered Medications Ordered in Epic   Medication Dose Route Frequency Provider Last Rate Last Admin    acetaminophen (TYLENOL) tablet 1,000 mg  1,000 mg Oral TID Shona Sanchez DNP, APRN        diazePAM (VALIUM) tablet 2 mg  2 mg Oral Q6H PRN Shona Sanchez DNP, APRN        gabapentin (NEURONTIN) capsule 300 mg  300 mg Oral TID Shona Sanchez DNP, APRN        guaiFENesin (MUCINEX) 12 hr tablet 1,200 mg  1,200 mg Oral Q12H Shona Sanchez DNP, APRN        HYDROmorphone (DILAUDID) injection 0.5 mg  0.5 mg Intravenous Q2H PRN Shona Sanchez DNP, APRN        ipratropium-albuterol (DUO-NEB) nebulizer solution 3 mL  3 mL Nebulization 4x Daily - RT Shona Sanchez DNP, APRN   3 mL at 02/19/25 1502    Lidocaine 4 % 1 patch  1 patch Transdermal Q24H Shona Sanchez DNP, APRN        oxyCODONE (ROXICODONE) immediate release tablet 5 mg  5 mg Oral Q4H PRN Shona Sanchez DNP, APRN        Pedialyte oral packet pack 1 each  1 each Oral Daily Luz Marina Dahl MD         Current Outpatient Medications Ordered in Epic   Medication Sig Dispense Refill    Ascorbic Acid (VITAMIN C PO) Take 1 tablet by mouth Every Night.      Cholecalciferol (VITAMIN D-3 PO) Take 1 tablet by mouth Every Night.      Dapagliflozin Propanediol (Farxiga) 10 MG tablet Take 10 mg by mouth Daily.      Multiple Vitamins-Minerals (ZINC PO) Take 1 tablet by mouth Every Night.      omeprazole (priLOSEC) 40 MG capsule Take 1 capsule by mouth Every Night.      Semaglutide (Rybelsus) 3 MG tablet Take 1 tablet by mouth Daily.      telmisartan (MICARDIS) 40 MG tablet Take 1 tablet by mouth Every Night.           PROCEDURES  Procedures            PROGRESS, DATA ANALYSIS, CONSULTS, AND MEDICAL DECISION MAKING  All labs have been independently interpreted by me.  All radiology studies have been reviewed by me. All EKG's have been independently viewed and interpreted by me.  Discussion below represents my analysis of pertinent  findings related to patient's condition, differential diagnosis, treatment plan and final disposition.    DIFFERENTIAL    DDx includes but is not limited to acute coronary syndrome, pulmonary embolism, thoracic aortic dissection, pneumonia, pneumothorax, musculoskeletal pain, GERD or esophageal spasm, anxiety, myocarditis/pericarditis, esophageal rupture, pancreatitis.     HEART SCORE:    History  Highly suspicious              2    Moderately suspicious             1    Slightly or non-suspicious             0    ECG  Significant ST depression              2    Nonspecific repol disturbance            1    Normal                           0    Age  > or = 65                          2     46-65                           1    < or = 45                          0    Risk factors (hypercholesterolemia, HTN, DM, smoking, pos fam hx, obesity)                            > or = to 3 RF for atherosclerotic dx   2    1 or 2                 1    No risk factors                0    Troponin > or = 3x normal limit               2    1-3x normal limit    1    < or = Normal limit    0        HEART Score Key:  Scores 0-3: 0.9-1.7% risk of adverse cardiac event. In the HEART Score study, these patients were discharged (0.99% in the retrospective study, 1.7% in the prospective study)  Scores 4-6: 12-16.6% risk of adverse cardiac event. In the HEART Score study, these patients were admitted to the hospital. (11.6% retrospective, 16.6% prospective)  Scores >=7: 50-65% risk of adverse cardiac event. In the HEART Score study, these patients were candidates for early invasive measures. (65.2% retrospective, 50.1% prospective)      This patient's HEART score is 3         Clinical Scores:                  ED Course as of 02/19/25 1503   Wed Feb 19, 2025   1154 WBC: 10.69 [KA]   1154 Hemoglobin: 14.2 [KA]   1154 INR: 1.06 [KA]   1154 HS Troponin T: 9 [KA]   1154 proBNP: <36.0 [KA]   1154 PTT: 28.3 [KA]   1300 Discussed all the patient's  results with them, plan for admission due to small hemothorax and rib fracture, he is agreeable and will discuss with thoracic surgery.  Pain controlled at this time. [KA]   1331 Discussed patient with PRADIP Goldstein for thoracic surgery.  She agrees to consult [KA]   1350 This patient with Dr. Roberts, hospitalist including history presentation workup and she agrees to admit [KA]   1454 Thoracic surgery has evaluated the patient at the bedside.  They did order An anesthesia consult to perform a block for pain control.  We did call anesthesia however there is no one in the clinic on Wednesdays to do that procedure, earliest it can be done as tomorrow [KA]      ED Course User Index  [KA] Lisette Long PA-C             AS OF 15:03 EST VITALS:    BP - 142/85  HR - 92  TEMP - 97.9 °F (36.6 °C) (Tympanic)  O2 SATS - 94%    COMPLEXITY OF CARE  The patient requires admission.      DIAGNOSIS  Final diagnoses:   Closed fracture of one rib of right side, initial encounter   Hemothorax on right         DISPOSITION  ED Disposition       ED Disposition   Decision to Admit    Condition   --    Comment   Level of Care: Telemetry [5]   Diagnosis: Hemothorax on right [431064]   Admitting Physician: JUDITH ROBERTS [[id number]]   Attending Physician: JUDITH ROBERTS [[id number]]   Is patient appropriate for Inpatient Observation Unit?: No [0]                                Please note that portions of this document were completed with a voice recognition program.    Note Disclaimer: At [facility], we believe that sharing information builds trust and better relationships. You are receiving this note because you recently visited [facility]. It is possible you will see health information before a provider has talked with you about it. This kind of information can be easy to misunderstand. To help you fully understand what it means for your health, we urge you to discuss this note with your provider.          Lisette Long PA-C  02/19/25 1508

## 2025-02-19 NOTE — ED TRIAGE NOTES
Pt c/o right sided abd pain that started last week. Pt was seen here for same last week. Pain became worse last night after coughing

## 2025-02-19 NOTE — ED PROVIDER NOTES
Pt presents to the ED c/o right sided abdominal pains and acute onset right chest pain after severe coughing spell last night.  Has had bruising across his abdomen and chest wall since being on a scuba diving trip to [facility] recently.  Bruising started after his first dive.  Minimal if any shortness of breath.  Denies fall or direct trauma.     On exam,   General: No acute distress, nontoxic  HEENT: Mucous membranes moist, atraumatic, EOMI  Neck: Full ROM  Pulm: Symmetric chest rise, nonlabored, lungs diminished breath sounds right greater than left  Cardiovascular: Regular rate and rhythm, intact distal pulses  GI: Soft, nontender, nondistended, no rebound, no guarding, bowel sounds present, ecchymosis across the lateral and anterior right side of his abdomen  MSK: Full ROM, no deformity, tenderness to the anterior lateral chest wall on the right without crepitus  Skin: Warm, dry  Neuro: Awake, alert, oriented x 4, GCS 15, moving all extremities, no focal deficits  Psych: Calm, cooperative      EKG - Per my independent interpretation at 1126:    EKG Time: 1123  Rhythm/Rate: Sinus rhythm with a rate of 91  Normal axis  Normal intervals  No acute ischemic changes  No STEMI       No emergent changes compared to January 17th 2022      Plan: Plan for CTA of the chest to eval for any signs of PE, pneumothorax, will check labs, EKG, and treat supportively.  Some concerns for scuba diving related injury given the ecchymosis started afterwards, already had a CT of the abdomen and pelvis 2 days ago that showed a small right pleural effusion with some overlapping edema to the abdominal wall.  No acute intra-abdominal findings at that time.    ED Course as of 02/20/25 1821 Wed Feb 19, 2025   1154 WBC: 10.69 [KA]   1154 Hemoglobin: 14.2 [KA]   1154 INR: 1.06 [KA]   1154 HS Troponin T: 9 [KA]   1154 proBNP: <36.0 [KA]   1154 PTT: 28.3 [KA]   1300 Discussed all the patient's results with them, plan for admission due to small  hemothorax and rib fracture, he is agreeable and will discuss with thoracic surgery.  Pain controlled at this time. [KA]   1331 Discussed patient with PRADIP Goldstein for thoracic surgery.  She agrees to consult [KA]   1350 This patient with Dr. Dahl, hospitalist including history presentation workup and she agrees to admit [KA]   1454 Thoracic surgery has evaluated the patient at the bedside.  They did order An anesthesia consult to perform a block for pain control.  We did call anesthesia however there is no one in the clinic on Wednesdays to do that procedure, earliest it can be done as tomorrow [KA]      ED Course User Index  [KA] Lisette Long PA-C       Findings today of a rib fracture and potentially developing hemothorax abdominal wall hematomas.  Patient is not on any anticoagulation.  Hemodynamically well-controlled.  Plan for hospitalization, thoracic surgery consult for the hemothorax, and further monitoring.     MD Attestation Note    SHARED VISIT: This visit was performed by BOTH a physician and an APC. The substantive portion of the medical decision making was performed by this attesting physician who made or approved the management plan and takes responsibility for patient management. All studies in the APC note (if performed) were independently interpreted by me.                   Klever Romero MD  02/19/25 1405       Klever Romero MD  02/20/25 7127

## 2025-02-19 NOTE — PROGRESS NOTES
Clinical Pharmacy Services: Medication History    Guevara Barreto is a 54 y.o. male presenting to [facility] for   Chief Complaint   Patient presents with    Abdominal Pain    Rib Pain       He  has a past medical history of Anterior epistaxis, At risk for cardiovascular event, At risk for sleep apnea, Cardiomegaly, Chronic cough, Diabetes (02/19/2025), Diabetes mellitus, Generalized anxiety disorder, GERD (gastroesophageal reflux disease), Groin rash, Hypertension, Left ventricular hypertrophy, Mild major depression, Painful urination, Right inguinal hernia, Scrotal swelling, Seasonal allergic rhinitis, and Type 2 diabetes mellitus.    Allergies as of 02/19/2025 - Reviewed 02/19/2025   Allergen Reaction Noted    Penicillins Hives and Rash 05/11/2016       Medication information was obtained from: Patient   Pharmacy and Phone Number:     Prior to Admission Medications       Prescriptions Last Dose Informant Patient Reported? Taking?    Ascorbic Acid (VITAMIN C PO)  Self Yes Yes    Take 1 tablet by mouth Every Night.    Cholecalciferol (VITAMIN D-3 PO)  Self Yes Yes    Take 1 tablet by mouth Every Night.    Dapagliflozin Propanediol (Farxiga) 10 MG tablet  Self Yes Yes    Take 10 mg by mouth Daily.    Multiple Vitamins-Minerals (ZINC PO)  Self Yes Yes    Take 1 tablet by mouth Every Night.    omeprazole (priLOSEC) 40 MG capsule  Self Yes Yes    Take 1 capsule by mouth Every Night.    Semaglutide (Rybelsus) 3 MG tablet  Self Yes Yes    Take 1 tablet by mouth Daily.    telmisartan (MICARDIS) 40 MG tablet  Self Yes Yes    Take 1 tablet by mouth Every Night.              Medication notes:     This medication list is complete to the best of my knowledge as of 2/19/2025    Please call if questions.    Yaneth Mueller  Medication History Technician   [phone]    2/19/2025 14:25 EST

## 2025-02-19 NOTE — ED NOTES
"Nursing report ED to floor  Guevara Barreto  54 y.o.  male    HPI :  HPI  Stated Reason for Visit: right sided abd/rib pain    Chief Complaint  Chief Complaint   Patient presents with    Abdominal Pain    Rib Pain       Admitting doctor:   Luz Marina Dahl MD    Admitting diagnosis:          Code status:   Current Code Status       Date Active Code Status Order ID Comments User Context       Prior            Allergies:   Penicillins    Isolation:   No active isolations    Intake and Output  No intake or output data in the 24 hours ending 02/19/25 1402    Weight:       02/19/25  1022   Weight: (!) 143 kg (315 lb)       Most recent vitals:   Vitals:    02/19/25 1022 02/19/25 1026   BP:  129/77   BP Location:  Right arm   Patient Position:  Standing   Pulse: 98    Resp: 18    Temp: 97.9 °F (36.6 °C)    TempSrc: Tympanic    SpO2: 96%    Weight: (!) 143 kg (315 lb)    Height: 190.5 cm (75\")        Active LDAs/IV Access:   Lines, Drains & Airways       Active LDAs       Name Placement date Placement time Site Days    Peripheral IV 02/19/25 1055 Anterior;Right Forearm 02/19/25  1055  Forearm  less than 1                    Labs (abnormal labs have a star):   Labs Reviewed   COMPREHENSIVE METABOLIC PANEL - Abnormal; Notable for the following components:       Result Value    Glucose 154 (*)     Creatinine 0.71 (*)     Potassium 3.4 (*)     Albumin 3.1 (*)     ALT (SGPT) 69 (*)     All other components within normal limits    Narrative:     GFR Categories in Chronic Kidney Disease (CKD)      GFR Category          GFR (mL/min/1.73)    Interpretation  G1                     90 or greater         Normal or high (1)  G2                      60-89                Mild decrease (1)  G3a                   45-59                Mild to moderate decrease  G3b                   30-44                Moderate to severe decrease  G4                    15-29                Severe decrease  G5                    14 or less           " Kidney failure          (1)In the absence of evidence of kidney disease, neither GFR category G1 or G2 fulfill the criteria for CKD.    eGFR calculation 2021 CKD-EPI creatinine equation, which does not include race as a factor   CBC WITH AUTO DIFFERENTIAL - Abnormal; Notable for the following components:    Platelets 525 (*)     Lymphocyte % 15.3 (*)     Neutrophils, Absolute 8.13 (*)     All other components within normal limits   PROTIME-INR - Normal   APTT - Normal   TROPONIN - Normal    Narrative:     High Sensitive Troponin T Reference Range:  <14.0 ng/L- Negative Female for AMI  <22.0 ng/L- Negative Male for AMI  >=14 - Abnormal Female indicating possible myocardial injury.  >=22 - Abnormal Male indicating possible myocardial injury.   Clinicians would have to utilize clinical acumen, EKG, Troponin, and serial changes to determine if it is an Acute Myocardial Infarction or myocardial injury due to an underlying chronic condition.        BNP (IN-HOUSE) - Normal    Narrative:     This assay is used as an aid in the diagnosis of individuals suspected of having heart failure. It can be used as an aid in the diagnosis of acute decompensated heart failure (ADHF) in patients presenting with signs and symptoms of ADHF to the emergency department (ED). In addition, NT-proBNP of <300 pg/mL indicates ADHF is not likely.    Age Range Result Interpretation  NT-proBNP Concentration (pg/mL:      <50             Positive            >450                   Gray                 300-450                    Negative             <300    50-75           Positive            >900                  Gray                300-900                  Negative            <300      >75             Positive            >1800                  Gray                300-1800                  Negative            <300   HIGH SENSITIVITIY TROPONIN T 1HR - Normal    Narrative:     High Sensitive Troponin T Reference Range:  <14.0 ng/L- Negative Female for  AMI  <22.0 ng/L- Negative Male for AMI  >=14 - Abnormal Female indicating possible myocardial injury.  >=22 - Abnormal Male indicating possible myocardial injury.   Clinicians would have to utilize clinical acumen, EKG, Troponin, and serial changes to determine if it is an Acute Myocardial Infarction or myocardial injury due to an underlying chronic condition.        RESPIRATORY PANEL PCR W/ COVID-19 (SARS-COV-2), NP SWAB IN UTM/VTP, 2 HR TAT   CBC AND DIFFERENTIAL    Narrative:     The following orders were created for panel order CBC & Differential.  Procedure                               Abnormality         Status                     ---------                               -----------         ------                     CBC Auto Differential[[id number]]        Abnormal            Final result                 Please view results for these tests on the individual orders.       EKG:   ECG 12 Lead Chest Pain   Final Result   HEART RATE=91  bpm   RR Migkdojy=750  ms   TX Rxinkdlm=636  ms   P Horizontal Axis=-26  deg   P Front Axis=42  deg   QRSD Interval=94  ms   QT Ocxpnvcd=790  ms   JZwD=349  ms   QRS Axis=37  deg   T Wave Axis=53  deg   - NORMAL ECG -   Sinus rhythm   When compared with ECG of 17-Jan-2022 09:03:39,   No significant change   Electronically Signed By: Lloyd Mendez ([facility]) 2025-02-19 13:23:47   Date and Time of Study:2025-02-19 11:23:37          Meds given in ED:   Medications   HYDROmorphone (DILAUDID) injection 0.5 mg (0.5 mg Intravenous Given 2/19/25 1100)   ondansetron (ZOFRAN) injection 4 mg (4 mg Intravenous Given 2/19/25 1058)   iopamidol (ISOVUE-370) 76 % injection 100 mL (95 mL Intravenous Given During Downtime 2/19/25 1222)       Imaging results:  CT Angiogram Chest Pulmonary Embolism    Result Date: 2/19/2025   1. The study is negative for pulmonary embolism in the main or proximal segmental pulmonary arterial branches. Evaluation of the more distal segmental and subsegmental pulmonary arterial  branches is limited no evidence of right heart strain. 2. Displaced fracture of the posterolateral right eighth rib with high attenuation collections in the posterolateral lower right chest wall that likely represent hematomas. 3. Enlarged heterogeneous small to moderate right pleural effusion, likely hemothorax, with associated compressive atelectasis of the right lower lobe. 4. Patchy groundglass opacities in the dependent and basilar right lower lobe could represent atelectasis or pulmonary contusion. 5. Mild stranding in the subcutaneous fat of the anterior and lateral right abdominal wall.  This report was finalized on 2/19/2025 1:12 PM by Luis Bryson MD on Workstation: [id number]      CT Abdomen Pelvis With Contrast    Result Date: 2/19/2025   1. The study is negative for pulmonary embolism in the main or proximal segmental pulmonary arterial branches. Evaluation of the more distal segmental and subsegmental pulmonary arterial branches is limited no evidence of right heart strain. 2. Displaced fracture of the posterolateral right eighth rib with high attenuation collections in the posterolateral lower right chest wall that likely represent hematomas. 3. Enlarged heterogeneous small to moderate right pleural effusion, likely hemothorax, with associated compressive atelectasis of the right lower lobe. 4. Patchy groundglass opacities in the dependent and basilar right lower lobe could represent atelectasis or pulmonary contusion. 5. Mild stranding in the subcutaneous fat of the anterior and lateral right abdominal wall.  This report was finalized on 2/19/2025 1:12 PM by Luis Bryson MD on Workstation: [id number]       Ambulatory status:   - ad eusebio    Social issues:   Social History     Socioeconomic History    Marital status:    Tobacco Use    Smoking status: Never    Smokeless tobacco: Never   Vaping Use    Vaping status: Never Used   Substance and Sexual Activity    Alcohol use: Yes     Comment: 1  WINE COOLER AT NIGHT    Drug use: Never    Sexual activity: Defer       Peripheral Neurovascular       Neuro Cognitive       Learning       Respiratory       Abdominal Pain       Pain Assessments  Pain (Adult)  (0-10) Pain Rating: Rest: 4  (0-10) Pain Rating: Activity: 8    NIH Stroke Scale       Arcelia Ortiz RN  02/19/25 14:02 EST\

## 2025-02-19 NOTE — PLAN OF CARE
"Goal Outcome Evaluation:  Plan of Care Reviewed With: patient        Progress: no change     Admitted from the Emergency dept. complaining of sharp right pleuritic discomfort with frequent non productive, dry cough x 1 week. Alert and oriented x 4. Spouse visiting at bedside. On room air. Normal sinus cardiac rhythm. Up with assistance of one out of bed. Morbidly obese. Falls risk noted. Refused dinner tray, stating, '' I'm not hungry.\" Blood sugar monitored AC/HS. Hx: Diabetes type 2. NPO after MN for IR placement of right pleural drain tomorrow. Due to void, urinal at bedside. Bed alarm on. Call light within patient's reach. IV prn dilaudid 0.5 mg given for complaint of severe pain, level reported per patient an \" 8\". Relief voiced. Oral scheduled tylenol, gabapentin and mucinex given as ordered. Resting in bed with eyes closed.                              "

## 2025-02-20 ENCOUNTER — APPOINTMENT (OUTPATIENT)
Dept: PAIN MEDICINE | Facility: HOSPITAL | Age: 55
DRG: 205 | End: 2025-02-20
Payer: COMMERCIAL

## 2025-02-20 ENCOUNTER — ANESTHESIA EVENT (OUTPATIENT)
Dept: PAIN MEDICINE | Facility: HOSPITAL | Age: 55
End: 2025-02-20
Payer: COMMERCIAL

## 2025-02-20 ENCOUNTER — APPOINTMENT (OUTPATIENT)
Dept: GENERAL RADIOLOGY | Facility: HOSPITAL | Age: 55
DRG: 205 | End: 2025-02-20
Payer: COMMERCIAL

## 2025-02-20 ENCOUNTER — APPOINTMENT (OUTPATIENT)
Dept: CT IMAGING | Facility: HOSPITAL | Age: 55
DRG: 205 | End: 2025-02-20
Payer: COMMERCIAL

## 2025-02-20 ENCOUNTER — ANESTHESIA (OUTPATIENT)
Dept: PAIN MEDICINE | Facility: HOSPITAL | Age: 55
End: 2025-02-20
Payer: COMMERCIAL

## 2025-02-20 LAB
ALBUMIN SERPL-MCNC: 3.1 G/DL (ref 3.5–5.2)
ALBUMIN/GLOB SERPL: 1.1 G/DL
ALP SERPL-CCNC: 60 U/L (ref 39–117)
ALT SERPL W P-5'-P-CCNC: 51 U/L (ref 1–41)
ANION GAP SERPL CALCULATED.3IONS-SCNC: 8 MMOL/L (ref 5–15)
AST SERPL-CCNC: 29 U/L (ref 1–40)
BASOPHILS # BLD AUTO: 0.02 10*3/MM3 (ref 0–0.2)
BASOPHILS NFR BLD AUTO: 0.2 % (ref 0–1.5)
BILIRUB SERPL-MCNC: 0.8 MG/DL (ref 0–1.2)
BUN SERPL-MCNC: 10 MG/DL (ref 6–20)
BUN/CREAT SERPL: 16.7 (ref 7–25)
CALCIUM SPEC-SCNC: 8.3 MG/DL (ref 8.6–10.5)
CHLORIDE SERPL-SCNC: 100 MMOL/L (ref 98–107)
CO2 SERPL-SCNC: 26 MMOL/L (ref 22–29)
CREAT SERPL-MCNC: 0.6 MG/DL (ref 0.76–1.27)
DEPRECATED RDW RBC AUTO: 41 FL (ref 37–54)
EGFRCR SERPLBLD CKD-EPI 2021: 114 ML/MIN/1.73
EOSINOPHIL # BLD AUTO: 0.07 10*3/MM3 (ref 0–0.4)
EOSINOPHIL NFR BLD AUTO: 0.6 % (ref 0.3–6.2)
ERYTHROCYTE [DISTWIDTH] IN BLOOD BY AUTOMATED COUNT: 13.4 % (ref 12.3–15.4)
GLOBULIN UR ELPH-MCNC: 2.7 GM/DL
GLUCOSE BLDC GLUCOMTR-MCNC: 107 MG/DL (ref 70–130)
GLUCOSE BLDC GLUCOMTR-MCNC: 126 MG/DL (ref 70–130)
GLUCOSE BLDC GLUCOMTR-MCNC: 131 MG/DL (ref 70–130)
GLUCOSE FLD-MCNC: 97 MG/DL
GLUCOSE SERPL-MCNC: 110 MG/DL (ref 65–99)
HCT VFR BLD AUTO: 36.7 % (ref 37.5–51)
HGB BLD-MCNC: 12.4 G/DL (ref 13–17.7)
IMM GRANULOCYTES # BLD AUTO: 0.07 10*3/MM3 (ref 0–0.05)
IMM GRANULOCYTES NFR BLD AUTO: 0.6 % (ref 0–0.5)
INR PPP: 1.13 (ref 0.9–1.1)
LYMPHOCYTES # BLD AUTO: 2.15 10*3/MM3 (ref 0.7–3.1)
LYMPHOCYTES NFR BLD AUTO: 17.8 % (ref 19.6–45.3)
MAGNESIUM SERPL-MCNC: 2.3 MG/DL (ref 1.6–2.6)
MCH RBC QN AUTO: 28.2 PG (ref 26.6–33)
MCHC RBC AUTO-ENTMCNC: 33.8 G/DL (ref 31.5–35.7)
MCV RBC AUTO: 83.6 FL (ref 79–97)
MONOCYTES # BLD AUTO: 1.37 10*3/MM3 (ref 0.1–0.9)
MONOCYTES NFR BLD AUTO: 11.3 % (ref 5–12)
NEUTROPHILS NFR BLD AUTO: 69.5 % (ref 42.7–76)
NEUTROPHILS NFR BLD AUTO: 8.41 10*3/MM3 (ref 1.7–7)
NRBC BLD AUTO-RTO: 0 /100 WBC (ref 0–0.2)
PH FLD: 7.32 [PH]
PHOSPHATE SERPL-MCNC: 2.4 MG/DL (ref 2.5–4.5)
PLATELET # BLD AUTO: 467 10*3/MM3 (ref 140–450)
PMV BLD AUTO: 8.8 FL (ref 6–12)
POTASSIUM SERPL-SCNC: 3.8 MMOL/L (ref 3.5–5.2)
PROT FLD-MCNC: 4.5 G/DL
PROT SERPL-MCNC: 5.8 G/DL (ref 6–8.5)
PROTHROMBIN TIME: 14.4 SECONDS (ref 11.7–14.2)
RBC # BLD AUTO: 4.39 10*6/MM3 (ref 4.14–5.8)
SODIUM SERPL-SCNC: 134 MMOL/L (ref 136–145)
TSH SERPL DL<=0.05 MIU/L-ACNC: 0.94 UIU/ML (ref 0.27–4.2)
WBC NRBC COR # BLD AUTO: 12.09 10*3/MM3 (ref 3.4–10.8)

## 2025-02-20 PROCEDURE — 25010000002 LIDOCAINE 1 % SOLUTION: Performed by: RADIOLOGY

## 2025-02-20 PROCEDURE — 94799 UNLISTED PULMONARY SVC/PX: CPT

## 2025-02-20 PROCEDURE — 87205 SMEAR GRAM STAIN: CPT

## 2025-02-20 PROCEDURE — 88342 IMHCHEM/IMCYTCHM 1ST ANTB: CPT | Performed by: NURSE PRACTITIONER

## 2025-02-20 PROCEDURE — 71045 X-RAY EXAM CHEST 1 VIEW: CPT

## 2025-02-20 PROCEDURE — 0W9930Z DRAINAGE OF RIGHT PLEURAL CAVITY WITH DRAINAGE DEVICE, PERCUTANEOUS APPROACH: ICD-10-PCS | Performed by: NURSE PRACTITIONER

## 2025-02-20 PROCEDURE — 25010000002 BUPIVACAINE LIPOSOME 1.3 % SUSPENSION: Performed by: STUDENT IN AN ORGANIZED HEALTH CARE EDUCATION/TRAINING PROGRAM

## 2025-02-20 PROCEDURE — 83986 ASSAY PH BODY FLUID NOS: CPT | Performed by: NURSE PRACTITIONER

## 2025-02-20 PROCEDURE — 84157 ASSAY OF PROTEIN OTHER: CPT | Performed by: NURSE PRACTITIONER

## 2025-02-20 PROCEDURE — 87070 CULTURE OTHR SPECIMN AEROBIC: CPT

## 2025-02-20 PROCEDURE — 83735 ASSAY OF MAGNESIUM: CPT | Performed by: INTERNAL MEDICINE

## 2025-02-20 PROCEDURE — 85610 PROTHROMBIN TIME: CPT | Performed by: INTERNAL MEDICINE

## 2025-02-20 PROCEDURE — 82945 GLUCOSE OTHER FLUID: CPT | Performed by: NURSE PRACTITIONER

## 2025-02-20 PROCEDURE — 97165 OT EVAL LOW COMPLEX 30 MIN: CPT

## 2025-02-20 PROCEDURE — 84100 ASSAY OF PHOSPHORUS: CPT | Performed by: INTERNAL MEDICINE

## 2025-02-20 PROCEDURE — 25010000002 BUPIVACAINE (PF) 0.5 % SOLUTION: Performed by: STUDENT IN AN ORGANIZED HEALTH CARE EDUCATION/TRAINING PROGRAM

## 2025-02-20 PROCEDURE — 99221 1ST HOSP IP/OBS SF/LOW 40: CPT

## 2025-02-20 PROCEDURE — 88305 TISSUE EXAM BY PATHOLOGIST: CPT | Performed by: NURSE PRACTITIONER

## 2025-02-20 PROCEDURE — 25010000002 LIDOCAINE PF 1% 1 % SOLUTION: Performed by: STUDENT IN AN ORGANIZED HEALTH CARE EDUCATION/TRAINING PROGRAM

## 2025-02-20 PROCEDURE — 25010000002 HYDROMORPHONE PER 4 MG: Performed by: INTERNAL MEDICINE

## 2025-02-20 PROCEDURE — 88112 CYTOPATH CELL ENHANCE TECH: CPT | Performed by: NURSE PRACTITIONER

## 2025-02-20 PROCEDURE — 80050 GENERAL HEALTH PANEL: CPT | Performed by: INTERNAL MEDICINE

## 2025-02-20 PROCEDURE — 82948 REAGENT STRIP/BLOOD GLUCOSE: CPT

## 2025-02-20 PROCEDURE — 88341 IMHCHEM/IMCYTCHM EA ADD ANTB: CPT | Performed by: NURSE PRACTITIONER

## 2025-02-20 PROCEDURE — C1729 CATH, DRAINAGE: HCPCS

## 2025-02-20 RX ORDER — LIDOCAINE HYDROCHLORIDE 10 MG/ML
20 INJECTION, SOLUTION INFILTRATION; PERINEURAL ONCE
Status: COMPLETED | OUTPATIENT
Start: 2025-02-20 | End: 2025-02-20

## 2025-02-20 RX ORDER — LIDOCAINE HYDROCHLORIDE 10 MG/ML
5 INJECTION, SOLUTION INFILTRATION; PERINEURAL ONCE
Status: COMPLETED | OUTPATIENT
Start: 2025-02-20 | End: 2025-02-20

## 2025-02-20 RX ORDER — BENZONATATE 100 MG/1
100 CAPSULE ORAL 3 TIMES DAILY PRN
Status: DISCONTINUED | OUTPATIENT
Start: 2025-02-20 | End: 2025-02-22 | Stop reason: HOSPADM

## 2025-02-20 RX ORDER — BUPIVACAINE HYDROCHLORIDE 5 MG/ML
10 INJECTION, SOLUTION EPIDURAL; INTRACAUDAL ONCE
Status: COMPLETED | OUTPATIENT
Start: 2025-02-20 | End: 2025-02-20

## 2025-02-20 RX ORDER — FENTANYL CITRATE 50 UG/ML
50 INJECTION, SOLUTION INTRAMUSCULAR; INTRAVENOUS ONCE
Status: DISCONTINUED | OUTPATIENT
Start: 2025-02-20 | End: 2025-02-22 | Stop reason: HOSPADM

## 2025-02-20 RX ORDER — MIDAZOLAM HYDROCHLORIDE 1 MG/ML
2 INJECTION, SOLUTION INTRAMUSCULAR; INTRAVENOUS ONCE AS NEEDED
Status: DISCONTINUED | OUTPATIENT
Start: 2025-02-20 | End: 2025-02-22 | Stop reason: HOSPADM

## 2025-02-20 RX ORDER — LIDOCAINE HYDROCHLORIDE 10 MG/ML
1 INJECTION, SOLUTION INFILTRATION; PERINEURAL ONCE
Status: DISCONTINUED | OUTPATIENT
Start: 2025-02-20 | End: 2025-02-22 | Stop reason: HOSPADM

## 2025-02-20 RX ADMIN — GUAIFENESIN 1200 MG: 600 TABLET, MULTILAYER, EXTENDED RELEASE ORAL at 13:05

## 2025-02-20 RX ADMIN — BUPIVACAINE 133 MG: 13.3 INJECTION, SUSPENSION, LIPOSOMAL INFILTRATION at 09:12

## 2025-02-20 RX ADMIN — BENZONATATE 100 MG: 100 CAPSULE ORAL at 21:31

## 2025-02-20 RX ADMIN — LIDOCAINE 1 PATCH: 4 PATCH TOPICAL at 13:05

## 2025-02-20 RX ADMIN — GUAIFENESIN 1200 MG: 600 TABLET, MULTILAYER, EXTENDED RELEASE ORAL at 21:31

## 2025-02-20 RX ADMIN — Medication 10 ML: at 21:33

## 2025-02-20 RX ADMIN — LOSARTAN POTASSIUM 50 MG: 50 TABLET, FILM COATED ORAL at 21:31

## 2025-02-20 RX ADMIN — BUPIVACAINE HYDROCHLORIDE 10 ML: 5 INJECTION, SOLUTION EPIDURAL; INTRACAUDAL; PERINEURAL at 09:11

## 2025-02-20 RX ADMIN — Medication 1 EACH: at 18:44

## 2025-02-20 RX ADMIN — LIDOCAINE HYDROCHLORIDE 5 ML: 10 INJECTION, SOLUTION EPIDURAL; INFILTRATION; INTRACAUDAL; PERINEURAL at 09:12

## 2025-02-20 RX ADMIN — IPRATROPIUM BROMIDE AND ALBUTEROL SULFATE 3 ML: .5; 3 SOLUTION RESPIRATORY (INHALATION) at 23:08

## 2025-02-20 RX ADMIN — PANTOPRAZOLE SODIUM 40 MG: 40 TABLET, DELAYED RELEASE ORAL at 13:43

## 2025-02-20 RX ADMIN — GABAPENTIN 300 MG: 300 CAPSULE ORAL at 13:05

## 2025-02-20 RX ADMIN — ACETAMINOPHEN 1000 MG: 500 TABLET, FILM COATED ORAL at 21:32

## 2025-02-20 RX ADMIN — LIDOCAINE HYDROCHLORIDE 20 ML: 10 INJECTION, SOLUTION INFILTRATION; PERINEURAL at 11:27

## 2025-02-20 RX ADMIN — ACETAMINOPHEN 1000 MG: 500 TABLET, FILM COATED ORAL at 13:06

## 2025-02-20 RX ADMIN — Medication 10 ML: at 13:08

## 2025-02-20 RX ADMIN — HYDROMORPHONE HYDROCHLORIDE 0.5 MG: 1 INJECTION, SOLUTION INTRAMUSCULAR; INTRAVENOUS; SUBCUTANEOUS at 11:04

## 2025-02-20 RX ADMIN — GABAPENTIN 300 MG: 300 CAPSULE ORAL at 21:31

## 2025-02-20 RX ADMIN — DIAZEPAM 2 MG: 2 TABLET ORAL at 21:32

## 2025-02-20 RX ADMIN — IPRATROPIUM BROMIDE AND ALBUTEROL SULFATE 3 ML: .5; 3 SOLUTION RESPIRATORY (INHALATION) at 14:57

## 2025-02-20 NOTE — H&P
CHIEF COMPLAINT:   Acute right sided back pain    HISTORY OF PRESENT ILLNESS:  Mr. Barreto is a 54-year-old male who was admitted to the hospital yesterday, 2/19/2025 for right sided thoracic pain.  Workup in the hospital with chest CT showed a right eighth rib fracture and hemothorax.  His pain started 2 weeks ago when he was diagnosed with the flu and worsened after his diving trip in [facility] before he presented to the hospital.    The patient's pain is located in right side and does not radiate.  Their pain is a 5/10.  The symptom is described as intermittent, sharp, stabbing, tender ache.  The pain is stable in severity.   Their symptoms are exacerbated by activity exertion, coughing and alleviated by rest and medications.  Conservative therapies that the patient has attempted include rest, OTC medications.  In the hospital he was started on pain medication, lidocaine patch, gabapentin.      PAST MEDICAL HISTORY:  No current facility-administered medications on file prior to encounter.     Current Outpatient Medications on File Prior to Encounter   Medication Sig Dispense Refill    Ascorbic Acid (VITAMIN C PO) Take 1 tablet by mouth Every Night.      Cholecalciferol (VITAMIN D-3 PO) Take 1 tablet by mouth Every Night.      Dapagliflozin Propanediol (Farxiga) 10 MG tablet Take 10 mg by mouth Daily.      Multiple Vitamins-Minerals (ZINC PO) Take 1 tablet by mouth Every Night.      omeprazole (priLOSEC) 40 MG capsule Take 1 capsule by mouth Every Night.      Semaglutide (Rybelsus) 3 MG tablet Take 1 tablet by mouth Daily.      telmisartan (MICARDIS) 40 MG tablet Take 1 tablet by mouth Every Night.         Past Medical History:   Diagnosis Date    Anterior epistaxis     At risk for cardiovascular event     At risk for sleep apnea     STOP BANG - 5    Cardiomegaly     Chronic cough     Diabetes 02/19/2025    Diabetes mellitus     Generalized anxiety disorder     GERD (gastroesophageal reflux disease)     Groin rash   "   Hypertension     Left ventricular hypertrophy     Mild major depression     Painful urination     Right inguinal hernia     Scrotal swelling     Seasonal allergic rhinitis     Type 2 diabetes mellitus          SOCIAL HISTORY:  Negative tobacco product use    REVIEW OF SYSTEMS:  No hematologic infectious or constitutional symptoms  Other review of systems non-contributory  Positive JAE screen/DX:  2 or more mitigating factors include non-supine recovery position, none or reduced opiate use    Patient does not have a prior diagnosis of JAE.  We discussed the fact that he was screened as high risk in 2 consider further evaluation with the primary care physician in the future.      PHYSICAL EXAM:  /78 (BP Location: Right arm, Patient Position: Lying)   Pulse 93   Temp 37.2 °C (98.9 °F) (Infrared)   Resp 20   Ht 190.5 cm (75\")   Wt (!) 143 kg (315 lb)   SpO2 93%   BMI 39.37 kg/m²   Well-developed, well-nourished, no acute distress  Alert and oriented ×3  Extra ocular movements intact  Airway: Mallampati 2  Unlabored respirations  RRR  Abdomen soft nontender  Extremities warm and well-perfused      DIAGNOSIS:  Post-Op Diagnosis Codes:     * Right rib fracture [S22.31XA]     * Acute pain due to trauma [G89.11]    PLAN:    Right erector spinae plane block    The risk and benefits of the procedure have been explained to the patient including the risk of bleeding, infection, injury to the chest including the possibility of pneumothorax  "

## 2025-02-20 NOTE — PLAN OF CARE
Goal Outcome Evaluation:              Outcome Evaluation: Patient a/o x4, cooperative with care. Patient on [facility] diet and NPO at 0000 in anticipation of surgery on Thursday. NSR on monitor, up x1 to bathroom. All meds given as ordered. No new issues noted. This RN wore appropriate PPE during care. See v/s and labs.

## 2025-02-20 NOTE — SIGNIFICANT NOTE
02/20/25 1102   OTHER   Discipline occupational therapist   Rehab Time/Intention   Session Not Performed patient unavailable for evaluation  (Pt off the floor this AM for block, also going off floor this date for CT placement. Will hold and f/u in PM if time allows vs next service date. Per RN pt is up SBA.)   Therapy Assessment/Plan (PT)   Criteria for Skilled Interventions Met (PT) yes;meets criteria   Recommendation   OT - Next Appointment 02/21/25

## 2025-02-20 NOTE — ANESTHESIA PROCEDURE NOTES
Peripheral Block      Patient reassessed immediately prior to procedure    Patient location during procedure: pain clinic  Reason for block: procedure for pain  Performed by  Anesthesiologist: Shanice Mack MD  Preanesthetic Checklist  Completed: patient identified, IV checked, site marked, risks and benefits discussed, surgical consent, monitors and equipment checked, pre-op evaluation and timeout performed  Prep:  Pt Position: sitting  Sterile barriers:gloves, gown and washed/disinfected hands  Prep: ChloraPrep  Patient monitoring: blood pressure monitoring, continuous pulse oximetry and EKG  Procedure    Sedation: no  Performed under: local infiltration  Guidance:ultrasound guided    ULTRASOUND INTERPRETATION.  Using ultrasound guidance a 21 G gauge needle was placed in close proximity to the nerve, at which point, under ultrasound guidance anesthetic was injected in the area of the nerve and spread of the anesthesia was seen on ultrasound in close proximity thereto.  There were no abnormalities seen on ultrasound; a digital image was taken; and the patient tolerated the procedure with no complications. Images:still images obtained, printed/placed on chart    Laterality:right  Block Type:erector spinae block  Injection Technique:single-shot  Needle Type:echogenic            Medications  Comment:Exparel 10 cc and bupivacaine 0.5% 10 cc used in the block    Post Assessment  Injection Assessment: negative aspiration for heme and incremental injection  Complications:no  Additional Notes  Ultrasound guidance used for safe guidance of needle placement and incremental injection of local anesthetic.    Diagnosis: Right rib fracture, acute pain secondary to trauma      Performed by: Shanice Mack MD

## 2025-02-20 NOTE — THERAPY TREATMENT NOTE
Patient Name: Guevara Barreto  : 1970    MRN: [id number]                              Today's Date: 2025       Admit Date: 2025    Visit Dx:     ICD-10-CM ICD-9-CM   1. Closed fracture of one rib of right side, initial encounter  S22.31XA 807.01   2. Hemothorax on right  J94.2 511.89     Patient Active Problem List   Diagnosis    Gastroesophageal reflux disease without esophagitis    Anterior epistaxis    Cardiomegaly    Chronic cough    Essential hypertension    Generalized anxiety disorder    Impaired fasting glucose    Left ventricular hypertrophy    Mild major depression    Seasonal allergic rhinitis    Right inguinal hernia    Wound dehiscence, surgical    Hemothorax on right    Diabetes    Closed fracture of one rib     Past Medical History:   Diagnosis Date    Anterior epistaxis     At risk for cardiovascular event     At risk for sleep apnea     STOP BANG - 5    Cardiomegaly     Chronic cough     Diabetes 2025    Diabetes mellitus     Generalized anxiety disorder     GERD (gastroesophageal reflux disease)     Groin rash     Hypertension     Left ventricular hypertrophy     Mild major depression     Painful urination     Right inguinal hernia     Scrotal swelling     Seasonal allergic rhinitis     Type 2 diabetes mellitus      Past Surgical History:   Procedure Laterality Date    AMPUTATION Left     MIDDLE TOE    COLONOSCOPY  2020    Pt reports 2 polyps, repeat in 10 years, [facility]    INCISION AND DRAINAGE TRUNK N/A 2022    Procedure: RIGHT INGUINAL WOUND EXPLORATION AND DRAINAGE;  Surgeon: Anton Mueller MD;  Location: [facility] OR;  Service: General;  Laterality: N/A;    INGUINAL HERNIA REPAIR Right 02/15/2022    Procedure: OPEN RIGHT INGUINAL HERNIA REPAIR WITH MESH;  Surgeon: Anton Mueller MD;  Location: [facility] MAIN OR;  Service: General;  Laterality: Right;    MANDIBLE FRACTURE SURGERY      WISDOM TOOTH EXTRACTION        General Information        Row Name 02/20/25 1436          OT Time and Intention    Subjective Information no complaints  -BC     Document Type discharge evaluation/summary  -BC     Mode of Treatment individual therapy;occupational therapy  -BC     Patient Effort good  -BC     Symptoms Noted During/After Treatment none  -BC       Row Name 02/20/25 1436          General Information    Patient Profile Reviewed yes  -BC     Prior Level of Function independent:;ADL's;work  -BC     Existing Precautions/Restrictions other (see comments)  Chest tube to LWS  -BC       Row Name 02/20/25 1436          Living Environment    People in Home spouse  -BC       Row Name 02/20/25 1436          Home Main Entrance    Number of Stairs, Main Entrance three  -BC       Row Name 02/20/25 1436          Cognition    Orientation Status (Cognition) oriented x 4  -BC               User Key  (r) = Recorded By, (t) = Taken By, (c) = Cosigned By      Initials Name Provider Type    BC Janee Quiroz, OT Occupational Therapist                     Mobility/ADL's       Row Name 02/20/25 1437          Bed Mobility    Bed Mobility supine-sit;sit-supine  -BC     Supine-Sit Bond (Bed Mobility) set up  -BC     Sit-Supine Bond (Bed Mobility) set up  -BC     Assistive Device (Bed Mobility) bed rails  -BC     Comment, (Bed Mobility) set up A only for chest tube safety/mgmt prior to rolling L and sitting up w/o any issues.  -BC       Row Name 02/20/25 1437          Transfers    Transfers sit-stand transfer;stand-sit transfer  -BC       Row Name 02/20/25 [facility]7          Sit-Stand Transfer    Sit-Stand Bond (Transfers) modified independence  -BC       Row Name 02/20/25 1437          Stand-Sit Transfer    Stand-Sit Bond (Transfers) modified independence  -BC       Row Name 02/20/25 1437          Functional Mobility    Functional Mobility- Comment from EOB standing to hallway/back. Limited distance due to LWS at this time.  -BC     Patient was able to  Ambulate yes  -BC       Row Name 02/20/25 1437          Activities of Daily Living    BADL Assessment/Intervention lower body dressing  -BC       Row Name 02/20/25 1437          Lower Body Dressing Assessment/Training    [place] Level (Lower Body Dressing) don;socks;modified independence  -BC     Position (Lower Body Dressing) edge of bed sitting  -BC     Comment, (Lower Body Dressing) able to reach B feet w/ no issues; block ATT denies any pain  -BC               User Key  (r) = Recorded By, (t) = Taken By, (c) = Cosigned By      Initials Name Provider Type    Janee Deras OT Occupational Therapist                   Obj/Interventions       Row Name 02/20/25 1439          Sensory Assessment (Somatosensory)    Sensory Assessment denies any changes  -BC       Row Name 02/20/25 1439          Range of Motion Comprehensive    General Range of Motion no range of motion deficits identified  -[facility] Name 02/20/25 [facility]9          Strength Comprehensive (MMT)    General Manual Muscle Testing (MMT) Assessment no strength deficits identified  -BC       Row Name 02/20/25 [facility]9          Balance    Comment, Balance (I) sitting/standing balance  -BC               User Key  (r) = Recorded By, (t) = Taken By, (c) = Cosigned By      Initials Name Provider Type    BC Janee Quiroz, DEVANG Occupational Therapist                   Goals/Plan    No documentation.                  Clinical Impression       [facility] Name 02/20/25 1439          Pain Assessment    Pretreatment Pain Rating 0/10 - no pain  -BC     Posttreatment Pain Rating 0/10 - no pain  -BC     Pre/Posttreatment Pain Comment reports SANDRINE block is still intact, no c/o pain  -[facility] Name 02/20/25 1439          Plan of Care Review    Plan of Care Reviewed With patient  -BC     Progress improving  -BC     Outcome Evaluation Pt is a 55 y/o M admitted to [facility] 2/19 with Closed fracture of one rib of right side, Hemothorax on right from coughing. S/p CT with SANDRINE block at  this time. Pt inocencio W (I) fxnl mobility w/ ADLs and short distance mobility. No skilled OT services warranted at this time. Recommending up on the unit daily for maintain function/strength. Education on chest tube safety/mgmt, and recommending calling out for assistance for safety with management of chest tube as needed and for suction tubing. Pt verb' understanding.  -BC       Row Name 02/20/25 1439          Therapy Assessment/Plan (OT)    Criteria for Skilled Therapeutic Interventions Met (OT) no problems identified which require skilled intervention  -BC       Row Name 02/20/25 1439          Therapy Plan Review/Discharge Plan (OT)    Anticipated Discharge Disposition (OT) home  -BC       Row Name 02/20/25 1439          Vital Signs    O2 Delivery Pre Treatment room air  -BC     O2 Delivery Intra Treatment room air  -BC     O2 Delivery Post Treatment room air  -BC     Pre Patient Position Supine  -BC     Intra Patient Position Standing  -BC     Post Patient Position Supine  -BC       Row Name 02/20/25 1439          Positioning and Restraints    Pre-Treatment Position in bed  -BC     Post Treatment Position bed  -BC     In Bed notified nsg;side lying left;call light within reach;encouraged to call for assist;exit alarm on  -BC               User Key  (r) = Recorded By, (t) = Taken By, (c) = Cosigned By      Initials Name Provider Type    BC Janee Quiroz, DEVANG Occupational Therapist                   Outcome Measures       Row Name 02/20/25 1442          How much help from another is currently needed...    Putting on and taking off regular lower body clothing? 4  -BC     Bathing (including washing, rinsing, and drying) 4  -BC     Toileting (which includes using toilet bed pan or urinal) 4  -BC     Putting on and taking off regular upper body clothing 4  -BC     Taking care of personal grooming (such as brushing teeth) 4  -BC     Eating meals 4  -BC     AM-PAC 6 Clicks Score (OT) 24  -BC       Row Name 02/20/25  1442          Functional Assessment    Outcome Measure Options AM-PAC 6 Clicks Daily Activity (OT)  -BC               User Key  (r) = Recorded By, (t) = Taken By, (c) = Cosigned By      Initials Name Provider Type    BC Janee Quiroz OT Occupational Therapist                    Occupational Therapy Education       Title: PT OT SLP Therapies (In Progress)       Topic: Occupational Therapy (In Progress)       Point: ADL training (Done)       Description:   Instruct learner(s) on proper safety adaptation and remediation techniques during self care or transfers.   Instruct in proper use of assistive devices.                  Learning Progress Summary            Patient Acceptance, E,TB, VU by BC at 2/20/2025 1442    Comment: role of OT, rec ambulate around unit tomorrow w/ staff and Chest tube suction compressor.                      Point: Home exercise program (Done)       Description:   Instruct learner(s) on appropriate technique for monitoring, assisting and/or progressing therapeutic exercises/activities.                  Learning Progress Summary            Patient Acceptance, E,TB, VU by BC at 2/20/2025 1442    Comment: role of OT, rec ambulate around unit tomorrow w/ staff and Chest tube suction compressor.                      Point: Precautions (Not Started)       Description:   Instruct learner(s) on prescribed precautions during self-care and functional transfers.                  Learner Progress:  Not documented in this visit.              Point: Body mechanics (Not Started)       Description:   Instruct learner(s) on proper positioning and spine alignment during self-care, functional mobility activities and/or exercises.                  Learner Progress:  Not documented in this visit.                              User Key       Initials Effective Dates Name Provider Type [facility] 07/29/24 -  Janee Quiroz OT Occupational Therapist OT                  OT Recommendation and Plan     Plan of  Care Review  Plan of Care Reviewed With: patient  Progress: improving  Outcome Evaluation: Pt is a 53 y/o M admitted to [facility] 2/19 with Closed fracture of one rib of right side, Hemothorax on right from coughing. S/p CT with SANDRINE block at this time. Pt demon'd W (I) fxnl mobility w/ ADLs and short distance mobility. No skilled OT services warranted at this time. Recommending up on the unit daily for maintain function/strength. Education on chest tube safety/mgmt, and recommending calling out for assistance for safety with management of chest tube as needed and for suction tubing. Pt verb' understanding.     Time Calculation:   Evaluation Complexity (OT)  Review Occupational Profile/Medical/Therapy History Complexity: brief/low complexity  Assessment, Occupational Performance/Identification of Deficit Complexity: 1-3 performance deficits  Clinical Decision Making Complexity (OT): problem focused assessment/low complexity  Overall Complexity of Evaluation (OT): low complexity     Time Calculation- OT       Row Name 02/20/25 1443 02/20/25 1102          Time Calculation- OT    OT Start Time 1404  -BC --     OT Stop Time 1412  -BC --     OT Time Calculation (min) 8 min  -BC --     Total Timed Code Minutes- OT 0 minute(s)  -BC --     OT Received On 02/20/25  -BC --     OT - Next Appointment -- 02/21/25  -BC               User Key  (r) = Recorded By, (t) = Taken By, (c) = Cosigned By      Initials Name Provider Type    BC Janee Quiroz OT Occupational Therapist                  Therapy Charges for Today       Code Description Service Date Service Provider Modifiers Qty    [id number]  OT EVAL LOW COMPLEXITY 2 2/20/2025 Janee Quiroz OT GO 1                 Janee Quiroz OT  2/20/2025

## 2025-02-20 NOTE — SIGNIFICANT NOTE
02/20/25 1458   OTHER   Discipline physical therapist   Rehab Time/Intention   Session Not Performed other (see comments);patient/family declined evaluation  (Pt ENRIKE this AM for SANDRINE block and then CT placement. Pt refused PT eval this PM, reports he just got up with OT and doesn't want to get up again. Requested PT f/u tomorrow.)   Therapy Assessment/Plan (PT)   Criteria for Skilled Interventions Met (PT) yes;meets criteria   Recommendation   PT - Next Appointment 02/21/25

## 2025-02-20 NOTE — PROGRESS NOTES
Name: Guevara Barreto ADMIT: 2025   : 1970  PCP: Bigg Hassan MD    MRN: [id number] LOS: 0 days   AGE/SEX: 54 y.o. male  ROOM: [facility]     Subjective   Subjective   Pain better after nerve block. Still coughing        Objective   Objective   Vital Signs  Temp:  [97.8 °F (36.6 °C)-98.9 °F (37.2 °C)] 98.9 °F (37.2 °C)  Heart Rate:  [] 111  Resp:  [18-20] 18  BP: ()/(64-85) 122/75  SpO2:  [93 %-95 %] 95 %  on   ;   Device (Oxygen Therapy): room air  Body mass index is 39.37 kg/m².  Physical Exam  Vitals reviewed.   Constitutional:       General: He is not in acute distress.     Appearance: He is obese.   Cardiovascular:      Rate and Rhythm: Regular rhythm. Tachycardia present.   Pulmonary:      Effort: No respiratory distress.      Breath sounds: Normal breath sounds.   Abdominal:      General: There is no distension.      Palpations: Abdomen is soft.      Tenderness: There is no abdominal tenderness.   Musculoskeletal:      Right lower leg: No edema.      Left lower leg: No edema.   Skin:     General: Skin is warm and dry.   Neurological:      Mental Status: He is alert and oriented to person, place, and time.   Psychiatric:         Mood and Affect: Mood normal.       Results Review     I reviewed the patient's new clinical results.  Results from last 7 days   Lab Units 25  0532 25  1107 25  0928   WBC 10*3/mm3 12.09* 10.69 6.49   HEMOGLOBIN g/dL 12.4* 14.2 15.3   PLATELETS 10*3/mm3 467* 525* 488*     Results from last 7 days   Lab Units 25  0532 25  1107 25  0928   SODIUM mmol/L 134* 136 140   POTASSIUM mmol/L 3.8 3.4* 3.6   CHLORIDE mmol/L 100 99 102   CO2 mmol/L 26.0 24.2 28.7   BUN mg/dL 10 9 9   CREATININE mg/dL 0.60* 0.71* 0.70*   GLUCOSE mg/dL 110* 154* 113*   EGFR mL/min/1.73 114.0 108.4 108.8     Results from last 7 days   Lab Units 25  0532 25  1107 25  0928   ALBUMIN g/dL 3.1* 3.1* 3.5   BILIRUBIN mg/dL 0.8 0.6 0.8   ALK  PHOS U/L 60 70 70   AST (SGOT) U/L 29 36 63*   ALT (SGPT) U/L 51* 69* 96*     Results from last 7 days   Lab Units 02/20/25  0532 02/19/25  1242 02/19/25  1107 02/17/25  0928   CALCIUM mg/dL 8.3*  --  8.9 8.8   ALBUMIN g/dL 3.1*  --  3.1* 3.5   MAGNESIUM mg/dL 2.3 2.2  --   --    PHOSPHORUS mg/dL 2.4*  --   --   --        Glucose   Date/Time Value Ref Range Status   02/20/2025 0623 126 70 - 130 mg/dL Final   02/19/2025 2124 129 70 - 130 mg/dL Final   02/19/2025 1642 121 70 - 130 mg/dL Final       CT Guided Chest Tube    Result Date: 2/20/2025  1. Successful CT-guided placement of 12 Malay catheter into right hemothorax  Radiation dose reduction techniques were utilized, including automated exposure control and exposure modulation based on body size.        XR Chest 1 View    Result Date: 2/20/2025  Similar-appearing opacification of the mid and basilar right lung, likely reflecting a small to moderate right hemothorax and underlying right perihilar and right basilar atelectasis, pulmonary contusion, and/or pneumonia.  This report was finalized on 2/20/2025 7:11 AM by Luis Bryson MD on Workstation: [id number]      CT Angiogram Chest Pulmonary Embolism    Result Date: 2/19/2025   1. The study is negative for pulmonary embolism in the main or proximal segmental pulmonary arterial branches. Evaluation of the more distal segmental and subsegmental pulmonary arterial branches is limited no evidence of right heart strain. 2. Displaced fracture of the posterolateral right eighth rib with high attenuation collections in the posterolateral lower right chest wall that likely represent hematomas. 3. Enlarged heterogeneous small to moderate right pleural effusion, likely hemothorax, with associated compressive atelectasis of the right lower lobe. 4. Patchy groundglass opacities in the dependent and basilar right lower lobe could represent atelectasis or pulmonary contusion. 5. Mild stranding in the subcutaneous fat of the  anterior and lateral right abdominal wall.  This report was finalized on 2/19/2025 1:12 PM by Luis Bryson MD on Workstation: [id number]      CT Abdomen Pelvis With Contrast    Result Date: 2/19/2025   1. The study is negative for pulmonary embolism in the main or proximal segmental pulmonary arterial branches. Evaluation of the more distal segmental and subsegmental pulmonary arterial branches is limited no evidence of right heart strain. 2. Displaced fracture of the posterolateral right eighth rib with high attenuation collections in the posterolateral lower right chest wall that likely represent hematomas. 3. Enlarged heterogeneous small to moderate right pleural effusion, likely hemothorax, with associated compressive atelectasis of the right lower lobe. 4. Patchy groundglass opacities in the dependent and basilar right lower lobe could represent atelectasis or pulmonary contusion. 5. Mild stranding in the subcutaneous fat of the anterior and lateral right abdominal wall.  This report was finalized on 2/19/2025 1:12 PM by Luis Bryson MD on Workstation: [id number]       I have personally reviewed all medications:  Scheduled Medications  acetaminophen, 1,000 mg, Oral, TID  benzocaine-menthol, 1 lozenge, Mouth/Throat, 4x Daily  empagliflozin, 25 mg, Oral, Daily  fentanyl, 50 mcg, Intravenous, Once  gabapentin, 300 mg, Oral, TID  guaiFENesin, 1,200 mg, Oral, Q12H  insulin lispro, 2-9 Units, Subcutaneous, 4x Daily AC & at Bedtime  ipratropium-albuterol, 3 mL, Nebulization, 4x Daily - RT  lidocaine, 1 mL, Intradermal, Once  Lidocaine, 1 patch, Transdermal, Q24H  losartan, 50 mg, Oral, Nightly  pantoprazole, 40 mg, Oral, Q AM  Pedialyte, 1 each, Oral, Daily  Semaglutide, 3 mg, Oral, Daily  sodium chloride, 10 mL, Intravenous, Q12H    Infusions   Diet  Diet: Regular/House; Fluid Consistency: Thin (IDDSI 0)    I have personally reviewed:  [x]  Laboratory   [x]  Microbiology   [x]  Radiology   []  EKG/Telemetry  []   Cardiology/Vascular   []  Pathology    []  Records       Assessment/Plan     Active Hospital Problems    Diagnosis  POA    **Hemothorax on right [J94.2]  Yes    Diabetes [E11.9]  Yes    Closed fracture of one rib [S22.39XA]  Yes    Chronic cough [R05.3]  Yes    Gastroesophageal reflux disease without esophagitis [K21.9]  Yes    Generalized anxiety disorder [F41.1]  Yes    Seasonal allergic rhinitis [J30.2]  Yes    Essential hypertension [I10]  Yes      Resolved Hospital Problems   No resolved problems to display.       54 y.o. male admitted with Hemothorax on right due to rib fracture from cough    Hemothorax status post CT-guided chest tube placement and erector spinae nerve block  - Defer chest tube management to thoracic surgery.  Discussed with APRN and PA.  - Adding Tessalon Perles for cough  - Symptomatic management with analgesics as needed and lidocaine patch.      DM2 well-controlled presently.  Correctional insulin available as needed.  He is on Jardiance.    HTN stable      DVT prophy: SCDs  Dispo: To be determined pending chest tube management.  Anticipate a couple days    Valentin Villa MD  [facility]  02/20/25  13:45 EST

## 2025-02-20 NOTE — PROGRESS NOTES
"    Chief Complaint: Hemothorax, right rib fracture      Subjective  Patient resting comfortably in bed following SANDRINE block and CT guided chest tube earlier today. Chest tube placed to -20 suction. Pain improved overall.  Vital Signs:  Temp:  [97.8 °F (36.6 °C)-98.9 °F (37.2 °C)] 98.9 °F (37.2 °C)  Heart Rate:  [] 111  Resp:  [18-20] 18  BP: ()/(64-85) 122/75    Intake & Output (last day)         02/19 0701  02/20 0700 02/20 0701  02/21 0700    P.O. 120     Total Intake(mL/kg) 120 (0.8)     Urine (mL/kg/hr) 0     Total Output 0     Net +120           Urine Unmeasured Occurrence 1 x             Objective:  General Appearance:  Well-appearing, in no acute distress and comfortable.    Vital signs: (most recent): Blood pressure 122/75, pulse 111, temperature 98.9 °F (37.2 °C), temperature source Infrared, resp. rate 18, height 190.5 cm (75\"), weight (!) 143 kg (315 lb), SpO2 95%.    Lungs:  Normal effort and normal respiratory rate.    Heart: Normal rate and tachycardia.    Chest: Chest wall tenderness present.    Abdomen: Abdomen is soft.    Neurological: Patient is alert and oriented to person, place and time.    Skin:  Warm and dry.                Chest tube:   Site: Right, Clean, Dry, and Intact  Suction: -20 cm  Air Leak: negative  24 Hour Total: 70 cc on exam    Results Review:     I reviewed the patient's new clinical results.  I reviewed the patient's new imaging results and agree with the interpretation.  Discussed with patient, surgeon    Imaging Results (Last 24 Hours)       Procedure Component Value Units Date/Time    CT Guided Chest Tube [[id number]] Collected: 02/20/25 1327     Updated: 02/20/25 1328    Narrative:      CT GUIDED RIGHT CHEST TUBE PLACEMENT 02/20/2025     HISTORY: Right hemothorax.     After signed informed consent was obtained the patient was prepped and  draped in the left lateral decubitus position. Lidocaine was used for  local anesthesia.     5 [place] [employer] catheter was " introduced into the right hemothorax. Small  amount of sanguinous fluid was visualized. 035 wire was exchanged. This  was followed by placement of 8 [place], 10 [place] and 12 [place] dilators.  This was followed by placement of a 12 [place] catheter into the right  hemothorax. Approximately 20 cc of bloody fluid was aspirated. Fluid  sample was sent to the laboratory for evaluation.     The catheter was left in place and connected to atrium suction. The  procedure tolerated well with no complications.       Impression:      1. Successful CT-guided placement of 12 [place] catheter into right  hemothorax     Radiation dose reduction techniques were utilized, including automated  exposure control and exposure modulation based on body size.             XR Chest 1 View [[id number]] Collected: 02/20/25 0709     Updated: 02/20/25 0714    Narrative:      XR CHEST 1 VW-     DATE OF EXAM: 2/20/2025 5:32 AM     INDICATION: Follow-up right pleural effusion.     COMPARISON: CT studies 2/19/2025 and 2/17/2025.     TECHNIQUE: A single portable AP view of the chest was obtained.     FINDINGS:  Lordotic positioning. Overlying artifacts. Low lung volumes.  Similar-appearing opacification of the mid and basilar right lung,  likely reflecting the small to moderate right hemothorax and underlying  right perihilar and right basilar atelectasis, pulmonary contusion,  and/or pneumonia. Similar-appearing left basilar opacity correlating  with prominent epicardial fat on the prior CT study. No pneumothorax.  Unchanged cardiac and mediastinal contours. Known right eighth rib  fracture is obscured.       Impression:      Similar-appearing opacification of the mid and basilar right lung,  likely reflecting a small to moderate right hemothorax and underlying  right perihilar and right basilar atelectasis, pulmonary contusion,  and/or pneumonia.     This report was finalized on 2/20/2025 7:11 AM by Luis Bryson MD on  Workstation: [id number]                Lab Results:     Lab Results (last 24 hours)       Procedure Component Value Units Date/Time    Glucose, Body Fluid - Pleural Fluid, Pleural Cavity [[id number]] Collected: 02/20/25 1149    Specimen: Pleural Fluid from Pleural Cavity Updated: 02/20/25 1242     Glucose, Fluid 97 mg/dL     Narrative:      No Reference Ranges Established.    Serous fluid glucose less than 60 mg/dL or less than 30 mg/dL below serum glucose suggests an infectious or malignant exudate.     This test was developed, it performance characteristics determined and judged suitable for clinical purposes by [facility].  It has not been cleared or approved by the FDA.  The laboratory is regulated under CLIA as qualified to perform high-complexity testing.     Protein, Body Fluid - Pleural Fluid, Pleural Cavity [[id number]] Collected: 02/20/25 1149    Specimen: Pleural Fluid from Pleural Cavity Updated: 02/20/25 1242     Protein, Total, Fluid 4.5 g/dL     Narrative:      No Reference Ranges Established.    A serous fluid total fluid (TP) greater than 50 percent of the serum TP suggests the fluid is an exudate.      1. Pleural TP/Serum TP >0.5  2. Pleural LD/Serum LD >0.6  3. Pleural LD >2/3 of the upper limit of normal for serum LDH    This test was developed, it performance characteristics determined and judged suitable for clinical purposes by [facility].  It has not been cleared or approved by the FDA.  The laboratory is regulated under CLIA as qualified to perform high-complexity testing.     pH, Body Fluid - Body Fluid, Pleural Cavity [[id number]] Collected: 02/20/25 1148    Specimen: Body Fluid from Pleural Cavity Updated: 02/20/25 1225     pH, Fluid 7.32    Narrative:      Reference Range:  Serous fluids 6.8-7.6     Pleural transudates: 7.4-7.5     Pleural exudates: 7.35-7.45     All other fluids: No defined reference ranges    This test was developed, its performance characteristics  determined and judged suitable for clinical purposes by [facility]. It has not been cleared or approved by the FDA. The laboratory is regulated under CLIA as qualified to perform high-complexity testing.    Body Fluid Culture - Body Fluid, Pleural Cavity [[id number]] Collected: 02/20/25 1150    Specimen: Body Fluid from Pleural Cavity Updated: 02/20/25 1205    TSH Rfx On Abnormal To Free T4 [[id number]]  (Normal) Collected: 02/20/25 0532    Specimen: Blood Updated: 02/20/25 0635     TSH 0.940 uIU/mL     Comprehensive Metabolic Panel [[id number]]  (Abnormal) Collected: 02/20/25 0532    Specimen: Blood Updated: 02/20/25 0628     Glucose 110 mg/dL      BUN 10 mg/dL      Creatinine 0.60 mg/dL      Sodium 134 mmol/L      Potassium 3.8 mmol/L      Chloride 100 mmol/L      CO2 26.0 mmol/L      Calcium 8.3 mg/dL      Total Protein 5.8 g/dL      Albumin 3.1 g/dL      ALT (SGPT) 51 U/L      AST (SGOT) 29 U/L      Alkaline Phosphatase 60 U/L      Total Bilirubin 0.8 mg/dL      Globulin 2.7 gm/dL      A/G Ratio 1.1 g/dL      BUN/Creatinine Ratio 16.7     Anion Gap 8.0 mmol/L      eGFR 114.0 mL/min/1.73     Narrative:      GFR Categories in Chronic Kidney Disease (CKD)      GFR Category          GFR (mL/min/1.73)    Interpretation  G1                     90 or greater         Normal or high (1)  G2                      60-89                Mild decrease (1)  G3a                   45-59                Mild to moderate decrease  G3b                   30-44                Moderate to severe decrease  G4                    15-29                Severe decrease  G5                    14 or less           Kidney failure          (1)In the absence of evidence of kidney disease, neither GFR category G1 or G2 fulfill the criteria for CKD.    eGFR calculation 2021 CKD-EPI creatinine equation, which does not include race as a factor    Magnesium [[id number]]  (Normal) Collected: 02/20/25 0532    Specimen: Blood  Updated: 02/20/25 0628     Magnesium 2.3 mg/dL     Phosphorus [[id number]]  (Abnormal) Collected: 02/20/25 0532    Specimen: Blood Updated: 02/20/25 0628     Phosphorus 2.4 mg/dL     POC Glucose Once [[id number]]  (Normal) Collected: 02/20/25 0623    Specimen: Blood Updated: 02/20/25 0624     Glucose 126 mg/dL     Protime-INR [[id number]]  (Abnormal) Collected: 02/20/25 0532    Specimen: Blood Updated: 02/20/25 0615     Protime 14.4 Seconds      INR 1.13    CBC & Differential [[id number]]  (Abnormal) Collected: 02/20/25 0532    Specimen: Blood Updated: 02/20/25 0604    Narrative:      The following orders were created for panel order CBC & Differential.  Procedure                               Abnormality         Status                     ---------                               -----------         ------                     CBC Auto Differential[[id number]]        Abnormal            Final result                 Please view results for these tests on the individual orders.    CBC Auto Differential [[id number]]  (Abnormal) Collected: 02/20/25 0532    Specimen: Blood Updated: 02/20/25 0604     WBC 12.09 10*3/mm3      RBC 4.39 10*6/mm3      Hemoglobin 12.4 g/dL      Hematocrit 36.7 %      MCV 83.6 fL      MCH 28.2 pg      MCHC 33.8 g/dL      RDW 13.4 %      RDW-SD 41.0 fl      MPV 8.8 fL      Platelets 467 10*3/mm3      Neutrophil % 69.5 %      Lymphocyte % 17.8 %      Monocyte % 11.3 %      Eosinophil % 0.6 %      Basophil % 0.2 %      Immature Grans % 0.6 %      Neutrophils, Absolute 8.41 10*3/mm3      Lymphocytes, Absolute 2.15 10*3/mm3      Monocytes, Absolute 1.37 10*3/mm3      Eosinophils, Absolute 0.07 10*3/mm3      Basophils, Absolute 0.02 10*3/mm3      Immature Grans, Absolute 0.07 10*3/mm3      nRBC 0.0 /100 WBC     POC Glucose Once [[id number]]  (Normal) Collected: 02/19/25 2124    Specimen: Blood Updated: 02/19/25 2125     Glucose 129 mg/dL     POC Glucose Once [[id number]]  (Normal) Collected: 02/19/25 5663     Specimen: Blood Updated: 02/19/25 1644     Glucose 121 mg/dL     Magnesium [[id number]]  (Normal) Collected: 02/19/25 1242    Specimen: Blood from Arm, Left Updated: 02/19/25 1623     Magnesium 2.2 mg/dL     Respiratory Panel PCR w/COVID-19(SARS-CoV-2) JL/LEONIDAS/CAREN/PAD/COR/CHRISTINA In-House, NP Swab in UTM/VTM, 2 HR TAT - Swab, Nasopharynx [[id number]]  (Normal) Collected: 02/19/25 1419    Specimen: Swab from Nasopharynx Updated: 02/19/25 1516     ADENOVIRUS, PCR Not Detected     Coronavirus 229E Not Detected     Coronavirus HKU1 Not Detected     Coronavirus NL63 Not Detected     Coronavirus OC43 Not Detected     COVID19 Not Detected     Human Metapneumovirus Not Detected     Human Rhinovirus/Enterovirus Not Detected     Influenza A PCR Not Detected     Influenza B PCR Not Detected     Parainfluenza Virus 1 Not Detected     Parainfluenza Virus 2 Not Detected     Parainfluenza Virus 3 Not Detected     Parainfluenza Virus 4 Not Detected     RSV, PCR Not Detected     Bordetella pertussis pcr Not Detected     Bordetella parapertussis PCR Not Detected     Chlamydophila pneumoniae PCR Not Detected     Mycoplasma pneumo by PCR Not Detected    Narrative:      In the setting of a positive respiratory panel with a viral infection PLUS a negative procalcitonin without other underlying concern for bacterial infection, consider observing off antibiotics or discontinuation of antibiotics and continue supportive care. If the respiratory panel is positive for atypical bacterial infection (Bordetella pertussis, Chlamydophila pneumoniae, or Mycoplasma pneumoniae), consider antibiotic de-escalation to target atypical bacterial infection.             Assessment & Plan       Hemothorax on right    Gastroesophageal reflux disease without esophagitis    Chronic cough    Essential hypertension    Generalized anxiety disorder    Seasonal allergic rhinitis    Diabetes    Closed fracture of one rib       Assessment & Plan  Mr. Barreto is a pleasant  54-year-old gentleman s/p CT guided chest tube and SANDRINE block earlier today. He states his pain has improved from yesterday. Denies any shortness of breath. Intermittent cough.    Repeat chest x-ray and labs in AM. Chest tube to suction vs waterseal tomorrow pending results.  Full diet.  Mucolytics and cough suppressants as needed  Encourage ambulation and pulmonary toilet.  Rib fracture protocol for pain management.    Juliann Bedoya PA-C  [facility]  02/20/25  13:30 EST    Greater than 35 minutes was spent reviewing the patient's chart, radiographic imaging, labs, provider notes, assessing the patient and developing a plan of care.  This was discussed with the patient and RN.

## 2025-02-20 NOTE — PLAN OF CARE
Goal Outcome Evaluation:  Plan of Care Reviewed With: patient        Progress: improving  Outcome Evaluation: Pt is a 55 y/o M admitted to [facility] 2/19 with Closed fracture of one rib of right side, Hemothorax on right from coughing. S/p CT with SANDRINE block at this time. Pt demon'd W (I) fxnl mobility w/ ADLs and short distance mobility. No skilled OT services warranted at this time. Recommending up on the unit daily for maintain function/strength. Education on chest tube safety/mgmt, and recommending calling out for assistance for safety with management of chest tube as needed and for suction tubing. Pt verb' understanding.    Anticipated Discharge Disposition (OT): home

## 2025-02-21 ENCOUNTER — APPOINTMENT (OUTPATIENT)
Dept: GENERAL RADIOLOGY | Facility: HOSPITAL | Age: 55
DRG: 205 | End: 2025-02-21
Payer: COMMERCIAL

## 2025-02-21 DIAGNOSIS — E11.9 TYPE 2 DIABETES MELLITUS WITHOUT COMPLICATION, UNSPECIFIED WHETHER LONG TERM INSULIN USE: ICD-10-CM

## 2025-02-21 DIAGNOSIS — Z13.220 SCREENING CHOLESTEROL LEVEL: ICD-10-CM

## 2025-02-21 DIAGNOSIS — Z00.00 WELLNESS EXAMINATION: ICD-10-CM

## 2025-02-21 DIAGNOSIS — I10 ESSENTIAL HYPERTENSION: Primary | ICD-10-CM

## 2025-02-21 LAB
ALBUMIN SERPL-MCNC: 3 G/DL (ref 3.5–5.2)
ALBUMIN/GLOB SERPL: 1 G/DL
ALP SERPL-CCNC: 67 U/L (ref 39–117)
ALT SERPL W P-5'-P-CCNC: 38 U/L (ref 1–41)
ANION GAP SERPL CALCULATED.3IONS-SCNC: 9 MMOL/L (ref 5–15)
AST SERPL-CCNC: 20 U/L (ref 1–40)
BILIRUB SERPL-MCNC: 0.8 MG/DL (ref 0–1.2)
BUN SERPL-MCNC: 11 MG/DL (ref 6–20)
BUN/CREAT SERPL: 20.4 (ref 7–25)
CALCIUM SPEC-SCNC: 8.5 MG/DL (ref 8.6–10.5)
CHLORIDE SERPL-SCNC: 99 MMOL/L (ref 98–107)
CO2 SERPL-SCNC: 26 MMOL/L (ref 22–29)
CREAT SERPL-MCNC: 0.54 MG/DL (ref 0.76–1.27)
EGFRCR SERPLBLD CKD-EPI 2021: 117.7 ML/MIN/1.73
GLOBULIN UR ELPH-MCNC: 2.9 GM/DL
GLUCOSE BLDC GLUCOMTR-MCNC: 108 MG/DL (ref 70–130)
GLUCOSE BLDC GLUCOMTR-MCNC: 137 MG/DL (ref 70–130)
GLUCOSE BLDC GLUCOMTR-MCNC: 215 MG/DL (ref 70–130)
GLUCOSE BLDC GLUCOMTR-MCNC: 224 MG/DL (ref 70–130)
GLUCOSE SERPL-MCNC: 161 MG/DL (ref 65–99)
LAB AP CASE REPORT: NORMAL
LAB AP SPECIAL STAINS: NORMAL
PATH REPORT.FINAL DX SPEC: NORMAL
PATH REPORT.GROSS SPEC: NORMAL
POTASSIUM SERPL-SCNC: 3.6 MMOL/L (ref 3.5–5.2)
POTASSIUM SERPL-SCNC: 4.1 MMOL/L (ref 3.5–5.2)
PROT SERPL-MCNC: 5.9 G/DL (ref 6–8.5)
SODIUM SERPL-SCNC: 134 MMOL/L (ref 136–145)

## 2025-02-21 PROCEDURE — 97162 PT EVAL MOD COMPLEX 30 MIN: CPT

## 2025-02-21 PROCEDURE — 82948 REAGENT STRIP/BLOOD GLUCOSE: CPT

## 2025-02-21 PROCEDURE — 80053 COMPREHEN METABOLIC PANEL: CPT | Performed by: INTERNAL MEDICINE

## 2025-02-21 PROCEDURE — 94799 UNLISTED PULMONARY SVC/PX: CPT

## 2025-02-21 PROCEDURE — 84132 ASSAY OF SERUM POTASSIUM: CPT | Performed by: HOSPITALIST

## 2025-02-21 PROCEDURE — 71045 X-RAY EXAM CHEST 1 VIEW: CPT

## 2025-02-21 PROCEDURE — 94664 DEMO&/EVAL PT USE INHALER: CPT

## 2025-02-21 PROCEDURE — 99232 SBSQ HOSP IP/OBS MODERATE 35: CPT

## 2025-02-21 PROCEDURE — 94761 N-INVAS EAR/PLS OXIMETRY MLT: CPT

## 2025-02-21 PROCEDURE — 97116 GAIT TRAINING THERAPY: CPT

## 2025-02-21 RX ORDER — POTASSIUM CHLORIDE 1500 MG/1
40 TABLET, EXTENDED RELEASE ORAL EVERY 4 HOURS
Status: COMPLETED | OUTPATIENT
Start: 2025-02-21 | End: 2025-02-21

## 2025-02-21 RX ADMIN — GABAPENTIN 300 MG: 300 CAPSULE ORAL at 16:32

## 2025-02-21 RX ADMIN — GUAIFENESIN 1200 MG: 600 TABLET, MULTILAYER, EXTENDED RELEASE ORAL at 08:13

## 2025-02-21 RX ADMIN — IPRATROPIUM BROMIDE AND ALBUTEROL SULFATE 3 ML: .5; 3 SOLUTION RESPIRATORY (INHALATION) at 15:10

## 2025-02-21 RX ADMIN — GUAIFENESIN 1200 MG: 600 TABLET, MULTILAYER, EXTENDED RELEASE ORAL at 20:27

## 2025-02-21 RX ADMIN — ACETAMINOPHEN 1000 MG: 500 TABLET, FILM COATED ORAL at 08:12

## 2025-02-21 RX ADMIN — LOSARTAN POTASSIUM 50 MG: 50 TABLET, FILM COATED ORAL at 20:26

## 2025-02-21 RX ADMIN — POTASSIUM CHLORIDE 40 MEQ: 1500 TABLET, EXTENDED RELEASE ORAL at 16:32

## 2025-02-21 RX ADMIN — Medication 1 EACH: at 11:49

## 2025-02-21 RX ADMIN — POTASSIUM CHLORIDE 40 MEQ: 1500 TABLET, EXTENDED RELEASE ORAL at 11:49

## 2025-02-21 RX ADMIN — IPRATROPIUM BROMIDE AND ALBUTEROL SULFATE 3 ML: .5; 3 SOLUTION RESPIRATORY (INHALATION) at 11:02

## 2025-02-21 RX ADMIN — IPRATROPIUM BROMIDE AND ALBUTEROL SULFATE 3 ML: .5; 3 SOLUTION RESPIRATORY (INHALATION) at 21:48

## 2025-02-21 RX ADMIN — PANTOPRAZOLE SODIUM 40 MG: 40 TABLET, DELAYED RELEASE ORAL at 06:07

## 2025-02-21 RX ADMIN — GABAPENTIN 300 MG: 300 CAPSULE ORAL at 08:12

## 2025-02-21 RX ADMIN — Medication 10 ML: at 08:22

## 2025-02-21 RX ADMIN — GABAPENTIN 300 MG: 300 CAPSULE ORAL at 20:27

## 2025-02-21 RX ADMIN — ACETAMINOPHEN 1000 MG: 500 TABLET, FILM COATED ORAL at 16:32

## 2025-02-21 RX ADMIN — Medication 10 ML: at 20:28

## 2025-02-21 RX ADMIN — IPRATROPIUM BROMIDE AND ALBUTEROL SULFATE 3 ML: .5; 3 SOLUTION RESPIRATORY (INHALATION) at 06:40

## 2025-02-21 RX ADMIN — LIDOCAINE 1 PATCH: 4 PATCH TOPICAL at 08:12

## 2025-02-21 RX ADMIN — ACETAMINOPHEN 1000 MG: 500 TABLET, FILM COATED ORAL at 20:26

## 2025-02-21 RX ADMIN — Medication 1 EACH: at 20:30

## 2025-02-21 RX ADMIN — DIAZEPAM 2 MG: 2 TABLET ORAL at 20:26

## 2025-02-21 NOTE — DISCHARGE INSTRUCTIONS
Chest Tube Discharge Instructions    1. Activity:  Climb stairs as tolerated  May drive car when no longer taking pain medication.  Walk at least 3-4 times a day  Limit lifting for first week. No lifting, pushing, or pulling greater than 10 pounds till seen by MD.  Continue to use your incentive spirometer at least 4 times per day.    2. Nutrition:  Resume previous diet  Eat well balanced meals  Avoid constipation by eating fresh fruits, vegetables, whole grain foods and increasing fluid intake.    3. Incision (wound) Care:  Remove dressing after 48 hours  If continued drainage, change dressing every 24 hours and as needed.  May shower after discharge.  Wash around incision area with soap and water daily.  No lotions or creams on incision area.  Take temperature daily for the first week after discharge.     4. When to call for Medical Advise:  Fever greater than 101 degrees  Unusual or severe pain  Difficulty breathing  Incision changes (redness, swelling, or increased drainage)  Any questions or problems    5. Medication Instructions:  Take Pain medications as directed to stay comfortable  No driving or drinking alcohol when taking prescribed pain meds  Laxative of choice if needed for constipation.    6. Medication and dosages:  See discharge medication instruction form

## 2025-02-21 NOTE — THERAPY EVALUATION
Patient Name: Guevara Barreto  : 1970    MRN: [id number]                              Today's Date: 2025       Admit Date: 2025    Visit Dx:     ICD-10-CM ICD-9-CM   1. Closed fracture of one rib of right side, initial encounter  S22.31XA 807.01   2. Hemothorax on right  J94.2 511.89     Patient Active Problem List   Diagnosis    Gastroesophageal reflux disease without esophagitis    Anterior epistaxis    Cardiomegaly    Chronic cough    Essential hypertension    Generalized anxiety disorder    Impaired fasting glucose    Left ventricular hypertrophy    Mild major depression    Seasonal allergic rhinitis    Right inguinal hernia    Wound dehiscence, surgical    Hemothorax on right    Diabetes    Closed fracture of one rib     Past Medical History:   Diagnosis Date    Anterior epistaxis     At risk for cardiovascular event     At risk for sleep apnea     STOP BANG - 5    Cardiomegaly     Chronic cough     Diabetes 2025    Diabetes mellitus     Generalized anxiety disorder     GERD (gastroesophageal reflux disease)     Groin rash     Hypertension     Left ventricular hypertrophy     Mild major depression     Painful urination     Right inguinal hernia     Scrotal swelling     Seasonal allergic rhinitis     Type 2 diabetes mellitus      Past Surgical History:   Procedure Laterality Date    AMPUTATION Left     MIDDLE TOE    COLONOSCOPY  2020    Pt reports 2 polyps, repeat in 10 years, [facility]    INCISION AND DRAINAGE TRUNK N/A 2022    Procedure: RIGHT INGUINAL WOUND EXPLORATION AND DRAINAGE;  Surgeon: Anton Mueller MD;  Location: [facility] OR;  Service: General;  Laterality: N/A;    INGUINAL HERNIA REPAIR Right 02/15/2022    Procedure: OPEN RIGHT INGUINAL HERNIA REPAIR WITH MESH;  Surgeon: Anton Mueller MD;  Location: [facility] MAIN OR;  Service: General;  Laterality: Right;    MANDIBLE FRACTURE SURGERY      WISDOM TOOTH EXTRACTION        General Information        Row Name 02/21/25 [facility]2          Physical Therapy Time and Intention    Document Type discharge evaluation/summary  -     Mode of Treatment individual therapy;physical therapy  -       Row Name 02/21/25 1202          General Information    Patient Profile Reviewed yes  -     Prior Level of Function independent:;gait;transfer;bed mobility  -     Existing Precautions/Restrictions other (see comments)  CT x1 to WS  -       Row Name 02/21/25 1202          Living Environment    People in Home spouse  -       Row Name 02/21/25 1202          Home Main Entrance    Number of Stairs, Main Entrance three  -       Row Name 02/21/25 1202          Stairs Within Home, Primary    Number of Stairs, Within Home, Primary none  -       Row Name 02/21/25 1202          Cognition    Orientation Status (Cognition) oriented x 4  -       Row Name 02/21/25 1202          Safety Issues/Impairments Affecting Functional Mobility    Impairments Affecting Function (Mobility) endurance/activity tolerance;pain  -               User Key  (r) = Recorded By, (t) = Taken By, (c) = Cosigned By      Initials Name Provider Type     Cassie Lal PT Physical Therapist                   Mobility       Row Name 02/21/25 1203          Bed Mobility    Bed Mobility supine-sit  -     Supine-Sit [place] (Bed Mobility) independent  -     Sit-Supine [place] (Bed Mobility) not tested  NT - in bathroom  -     Assistive Device (Bed Mobility) bed rails  -       Row Name 02/21/25 1203          Sit-Stand Transfer    Sit-Stand [place] (Transfers) independent  -[facility] Name 02/21/25 1203          Gait/Stairs (Locomotion)    [place] Level (Gait) independent  -     Distance in Feet (Gait) 450  -               User Key  (r) = Recorded By, (t) = Taken By, (c) = Cosigned By      Initials Name Provider Type     Cassie Lal PT Physical Therapist                   Obj/Interventions       Row Name 02/21/25  1203          Range of Motion Comprehensive    General Range of Motion bilateral lower extremity ROM WFL  -       Row Name 02/21/25 1203          Strength Comprehensive (MMT)    General Manual Muscle Testing (MMT) Assessment no strength deficits identified  -     Comment, General Manual Muscle Testing (MMT) Assessment BLE WFL  -       Row Name 02/21/25 1203          Balance    Balance Assessment sitting static balance;sitting dynamic balance;standing static balance;standing dynamic balance  -     Static Sitting Balance independent  -     Dynamic Sitting Balance independent  -     Position, Sitting Balance unsupported  -     Static Standing Balance independent  -     Dynamic Standing Balance independent  -     Position/Device Used, Standing Balance unsupported  -     Balance Interventions sitting;standing;sit to stand;static;dynamic  -[facility] Name 02/21/25 1203          Sensory Assessment (Somatosensory)    Sensory Assessment (Somatosensory) LE sensation intact  -               User Key  (r) = Recorded By, (t) = Taken By, (c) = Cosigned By      Initials Name Provider Type     Cassie Lal, PT Physical Therapist                   Goals/Plan    No documentation.                  Clinical Impression       [facility] Name 02/21/25 1203          Pain    Pretreatment Pain Rating 0/10 - no pain  -     Posttreatment Pain Rating 0/10 - no pain  -[facility] Name 02/21/25 1203          Plan of Care Review    Plan of Care Reviewed With patient  -     Outcome Evaluation Pt is a 53 yo M admitted with R 8th rib fx and hemothorax. Pt s/p CT placement and SANDRINE block, now with CT to . Pt lives with his wife and reports independence at BL with no AD. Pt presents to PT with minimal functional impairments, reports he feels at his BL mobility status. Pt transferred to EOB and stood independently. Pt ambulated 3 laps around unit with no AD and no assist from PT, carrying his chest tube. Pt requesting to use  the bathroom on return to room, left in bathroom and encouraged to call out for assist if needed. Set up pt's chair for him and encouraged him to sit up after toileting. Pt is safe to be up ad eusebio - encouraged ambulation at least 3x/day and pt verbalized understanding. Pt with no further acute PT needs, will sign-off. Anticipate DC with family assist as needed.  -       Row Name 02/21/25 1203          Therapy Assessment/Plan (PT)    Criteria for Skilled Interventions Met (PT) no;no problems identified which require skilled intervention  -     Therapy Frequency (PT) evaluation only  -       Row Name 02/21/25 1203          Vital Signs    O2 Delivery Pre Treatment room air  -     O2 Delivery Intra Treatment room air  -     O2 Delivery Post Treatment room air  -       Row Name 02/21/25 1203          Positioning and Restraints    Pre-Treatment Position in bed  -     Post Treatment Position bathroom  -     Bathroom notified nsg;sitting;encouraged to call for assist;call light within reach  -               User Key  (r) = Recorded By, (t) = Taken By, (c) = Cosigned By      Initials Name Provider Type     Cassie Lal, PT Physical Therapist                   Outcome Measures       Row Name 02/21/25 1207 02/21/25 0800       How much help from another person do you currently need...    Turning from your back to your side while in flat bed without using bedrails? 4  - 3  -HW    Moving from lying on back to sitting on the side of a flat bed without bedrails? 4  - 3  -HW    Moving to and from a bed to a chair (including a wheelchair)? 4  - 3  -HW    Standing up from a chair using your arms (e.g., wheelchair, bedside chair)? 4  - 3  -HW    Climbing 3-5 steps with a railing? 4  - 3  -HW    To walk in hospital room? 4  - 3  -HW    AM-PAC 6 Clicks Score (PT) 24  - 18  -    Highest Level of Mobility Goal 8 --> Walked 250 feet or more  - 6 --> Walk 10 steps or more  -      Row Name 02/21/25  1207          Functional Assessment    Outcome Measure Options AM-PAC 6 Clicks Basic Mobility (PT)  -               User Key  (r) = Recorded By, (t) = Taken By, (c) = Cosigned By      Initials Name Provider Type     Korin Littlejohn, RN Registered Nurse     Cassie Lal, PT Physical Therapist                                 Physical Therapy Education       Title: PT OT SLP Therapies (In Progress)       Topic: Physical Therapy (In Progress)       Point: Mobility training (Done)       Learning Progress Summary            Patient Acceptance, E,TB,D, VU by  at 2/21/2025 1208                      Point: Home exercise program (Not Started)       Learner Progress:  Not documented in this visit.              Point: Body mechanics (Done)       Learning Progress Summary            Patient Acceptance, E,TB,D, VU by  at 2/21/2025 1208                      Point: Precautions (Done)       Learning Progress Summary            Patient Acceptance, E,TB,D, VU by  at 2/21/2025 1208                                      User Key       Initials Effective Dates Name Provider Type Discipline     04/08/22 -  Cassie Lal PT Physical Therapist PT                  PT Recommendation and Plan     Outcome Evaluation: Pt is a 53 yo M admitted with R 8th rib fx and hemothorax. Pt s/p CT placement and SANDRINE block, now with CT to . Pt lives with his wife and reports independence at BL with no AD. Pt presents to PT with minimal functional impairments, reports he feels at his BL mobility status. Pt transferred to EOB and stood independently. Pt ambulated 3 laps around unit with no AD and no assist from PT, carrying his chest tube. Pt requesting to use the bathroom on return to room, left in bathroom and encouraged to call out for assist if needed. Set up pt's chair for him and encouraged him to sit up after toileting. Pt is safe to be up ad eusebio - encouraged ambulation at least 3x/day and pt verbalized understanding. Pt with no  further acute PT needs, will sign-off. Anticipate DC with family assist as needed.     Time Calculation:         PT Charges       Row Name 02/21/25 1208             Time Calculation    Start Time 1005  -      Stop Time 1020  -      Time Calculation (min) 15 min  -      PT Received On 02/21/25  -         Time Calculation- PT    Total Timed Code Minutes- PT 12 minute(s)  -         Timed Charges    96077 - Gait Training Minutes  8  -BH      08978 - PT Therapeutic Activity Minutes 4  -         Total Minutes    Timed Charges Total Minutes 12  -       Total Minutes 12  -BH                User Key  (r) = Recorded By, (t) = Taken By, (c) = Cosigned By      Initials Name Provider Type     Cassie Lal, PT Physical Therapist                  Therapy Charges for Today       Code Description Service Date Service Provider Modifiers Qty    83412395574 HC GAIT TRAINING EA 15 MIN 2/21/2025 Cassie Lal, PT GP 1    25002533614 HC PT EVAL MOD COMPLEXITY 3 2/21/2025 Cassie Lal, PT GP 1            PT G-Codes  Outcome Measure Options: AM-PAC 6 Clicks Basic Mobility (PT)  AM-PAC 6 Clicks Score (PT): 24  AM-PAC 6 Clicks Score (OT): 24  PT Discharge Summary  Anticipated Discharge Disposition (PT): home with assist    Cassie Lal PT  2/21/2025

## 2025-02-21 NOTE — PROGRESS NOTES
"    Chief Complaint: Hemothorax, right rib fracture  S/P Ct-Guided chest tube placement       Subjective:  Symptoms:  Improved.  He reports cough (Productive, yellow sputum).  No shortness of breath.    Diet:  Adequate intake.  No nausea or vomiting.    Activity level: Returning to normal.    Pain:  He complains of pain that is mild.  Pain is well controlled and requiring pain medication.    Pain better controlled today.  Denies any shortness of breath.  Ambulating halls with some assistance.    Vital Signs:  Temp:  [98.6 °F (37 °C)-99.4 °F (37.4 °C)] 98.9 °F (37.2 °C)  Heart Rate:  [] 108  Resp:  [16-20] 16  BP: (111-152)/(61-81) 111/61    Intake & Output (last day)         02/20 0701  02/21 0700 02/21 0701  02/22 0700    P.O.      Total Intake(mL/kg)      Urine (mL/kg/hr) 0 (0)     Chest Tube 300     Total Output 300     Net -300           Urine Unmeasured Occurrence 1 x             Objective:  General Appearance:  Well-appearing, in no acute distress and comfortable.    Vital signs: (most recent): Blood pressure 111/61, pulse 108, temperature 98.9 °F (37.2 °C), temperature source Oral, resp. rate 16, height 190.5 cm (75\"), weight (!) 143 kg (315 lb), SpO2 95%.    Lungs:  Normal effort and normal respiratory rate.    Heart: Tachycardia.    Chest: Symmetric chest wall expansion. Chest wall tenderness (Around chest tube) present.    Abdomen: Abdomen is soft.    Neurological: Patient is alert and oriented to person, place and time.    Skin:  Warm and dry.                Chest tube:   Site: Right, Clean, Dry, and Intact  Suction: -20 cm  Air Leak: negative  24 Hour Total: 300 cc, sanguinous     Results Review:     I reviewed the patient's new clinical results.  I reviewed the patient's new imaging results and agree with the interpretation.  Discussed with patient, surgeon    Imaging Results (Last 24 Hours)       Procedure Component Value Units Date/Time    XR Chest 1 View [[id number]] Collected: 02/21/25 0724     " Updated: 02/21/25 0728    Narrative:      XR CHEST 1 VW-2/21/2025     HISTORY: Follow-up effusion.     Since yesterday's study there has been placement of a pigtail catheter  which terminates over the base of the right hemithorax. The right lower  lung has largely cleared since the previous study. There is some  elevation of the right hemidiaphragm. There still may be some minimal  pleural fluid blunting the right costophrenic sulcus. No pneumothorax is  seen.     Heart size is mildly enlarged.       Impression:      1. Since yesterday's study there has been placement of a right pigtail  catheter with significant clearing of the right lower hemithorax.  2 there is still may be some minimal blunting of the right costophrenic  sulcus with minimal right pleural effusion.        This report was finalized on 2/21/2025 7:25 AM by Dr. Shan Pineda M.D on Workstation: [id number]       CT Guided Chest Tube [[id number]] Collected: 02/20/25 1327     Updated: 02/20/25 1355    Narrative:      CT GUIDED RIGHT CHEST TUBE PLACEMENT 02/20/2025     HISTORY: Right hemothorax.     After signed informed consent was obtained the patient was prepped and  draped in the left lateral decubitus position. Lidocaine was used for  local anesthesia.     5 Stateless [place] catheter was introduced into the right hemothorax. Small  amount of sanguinous fluid was visualized. 035 wire was exchanged. This  was followed by placement of 8 Stateless, 10 Stateless and 12 Stateless dilators.  This was followed by placement of a 12 Stateless catheter into the right  hemothorax. Approximately 20 cc of bloody fluid was aspirated. Fluid  sample was sent to the laboratory for evaluation.     The catheter was left in place and connected to atrium suction. The  procedure tolerated well with no complications.       Impression:      1. Successful CT-guided placement of 12 Stateless catheter into right  hemothorax     Radiation dose reduction techniques were utilized, including  automated  exposure control and exposure modulation based on body size.              This report was finalized on 2/20/2025 1:52 PM by Dr. Shan Pineda M.D on Workstation: [id number]               Lab Results:     Lab Results (last 24 hours)       Procedure Component Value Units Date/Time    Comprehensive Metabolic Panel [[id number]]  (Abnormal) Collected: 02/21/25 0844    Specimen: Blood Updated: 02/21/25 0929     Glucose 161 mg/dL      BUN 11 mg/dL      Creatinine 0.54 mg/dL      Sodium 134 mmol/L      Potassium 3.6 mmol/L      Chloride 99 mmol/L      CO2 26.0 mmol/L      Calcium 8.5 mg/dL      Total Protein 5.9 g/dL      Albumin 3.0 g/dL      ALT (SGPT) 38 U/L      AST (SGOT) 20 U/L      Alkaline Phosphatase 67 U/L      Total Bilirubin 0.8 mg/dL      Globulin 2.9 gm/dL      A/G Ratio 1.0 g/dL      BUN/Creatinine Ratio 20.4     Anion Gap 9.0 mmol/L      eGFR 117.7 mL/min/1.73     Narrative:      GFR Categories in Chronic Kidney Disease (CKD)      GFR Category          GFR (mL/min/1.73)    Interpretation  G1                     90 or greater         Normal or high (1)  G2                      60-89                Mild decrease (1)  G3a                   45-59                Mild to moderate decrease  G3b                   30-44                Moderate to severe decrease  G4                    15-29                Severe decrease  G5                    14 or less           Kidney failure          (1)In the absence of evidence of kidney disease, neither GFR category G1 or G2 fulfill the criteria for CKD.    eGFR calculation 2021 CKD-EPI creatinine equation, which does not include race as a factor    POC Glucose Once [[id number]]  (Normal) Collected: 02/21/25 0550    Specimen: Blood Updated: 02/21/25 0550     Glucose 108 mg/dL     POC Glucose Once [[id number]]  (Abnormal) Collected: 02/20/25 2029    Specimen: Blood Updated: 02/20/25 2030     Glucose 131 mg/dL     POC Glucose Once [[id number]]  (Normal) Collected:  02/20/25 1649    Specimen: Blood Updated: 02/20/25 1651     Glucose 107 mg/dL     Body Fluid Culture - Body Fluid, Pleural Cavity [[id number]] Collected: 02/20/25 1150    Specimen: Body Fluid from Pleural Cavity Updated: 02/20/25 1600     Gram Stain Few (2+) WBCs seen      No organisms seen    Non-gynecologic Cytology [[id number]] Collected: 02/20/25 1148    Specimen: Body Fluid from Pleural Cavity Updated: 02/20/25 1348    Glucose, Body Fluid - Pleural Fluid, Pleural Cavity [[id number]] Collected: 02/20/25 1149    Specimen: Pleural Fluid from Pleural Cavity Updated: 02/20/25 1242     Glucose, Fluid 97 mg/dL     Narrative:      No Reference Ranges Established.    Serous fluid glucose less than 60 mg/dL or less than 30 mg/dL below serum glucose suggests an infectious or malignant exudate.     This test was developed, it performance characteristics determined and judged suitable for clinical purposes by [facility].  It has not been cleared or approved by the FDA.  The laboratory is regulated under CLIA as qualified to perform high-complexity testing.     Protein, Body Fluid - Pleural Fluid, Pleural Cavity [[id number]] Collected: 02/20/25 1149    Specimen: Pleural Fluid from Pleural Cavity Updated: 02/20/25 1242     Protein, Total, Fluid 4.5 g/dL     Narrative:      No Reference Ranges Established.    A serous fluid total fluid (TP) greater than 50 percent of the serum TP suggests the fluid is an exudate.      1. Pleural TP/Serum TP >0.5  2. Pleural LD/Serum LD >0.6  3. Pleural LD >2/3 of the upper limit of normal for serum LDH    This test was developed, it performance characteristics determined and judged suitable for clinical purposes by [facility].  It has not been cleared or approved by the FDA.  The laboratory is regulated under CLIA as qualified to perform high-complexity testing.     pH, Body Fluid - Body Fluid, Pleural Cavity [[id number]] Collected: 02/20/25 1149     Specimen: Body Fluid from Pleural Cavity Updated: 02/20/25 1225     pH, Fluid 7.32    Narrative:      Reference Range:  Serous fluids 6.8-7.6     Pleural transudates: 7.4-7.5     Pleural exudates: 7.35-7.45     All other fluids: No defined reference ranges    This test was developed, its performance characteristics determined and judged suitable for clinical purposes by [facility]. It has not been cleared or approved by the FDA. The laboratory is regulated under CLIA as qualified to perform high-complexity testing.             Assessment & Plan       Hemothorax on right    Gastroesophageal reflux disease without esophagitis    Chronic cough    Essential hypertension    Generalized anxiety disorder    Seasonal allergic rhinitis    Diabetes    Closed fracture of one rib       Assessment & Plan  Mr. Barreto is a pleasant 54-year-old gentleman s/p CT guided chest tube and SANDRINE block yesterday.  Pain better controlled today denies any shortness of breath and he is on room air.  A.m. chest x-ray improved.  Significant clearing of the right lower hemithorax.     small amount of old blood drained from chest tube overnight.  Will transition to waterseal.  Flush every 8 hours and as needed with 10 cc of normal saline to ensure patency.    Continue diet.  Mucolytics and cough suppressants as needed  Encourage ambulation and pulmonary toilet.  Rib fracture protocol for pain management.  A.m. chest x-ray, a.m. labs.    PRADIP Andersen  [facility]  02/21/25  10:37 EST    Greater than 30 minutes was spent reviewing the patient's chart, radiographic imaging, labs, provider notes, assessing the patient and developing a plan of care.  This was discussed with the patient and RN.

## 2025-02-21 NOTE — PLAN OF CARE
Goal Outcome Evaluation:  Plan of Care Reviewed With: patient        Progress: improving  Outcome Evaluation: VSS, no complaints. Chest tube placed to waterseal. Ambulating independently.

## 2025-02-21 NOTE — CASE MANAGEMENT/SOCIAL WORK
Discharge Planning Assessment  [facility]     Patient Name: Guevara Barreto  MRN: [id number]  Today's Date: 2/21/2025    Admit Date: 2/19/2025    Plan: Home   Discharge Needs Assessment       Row Name 02/21/25 1738       Living Environment    People in Home spouse    Current Living Arrangements home    Potentially Unsafe Housing Conditions none    Primary Care Provided by self    Provides Primary Care For no one    Family Caregiver if Needed spouse    Family Caregiver Names Emily    Quality of Family Relationships supportive    Able to Return to Prior Arrangements yes       Resource/Environmental Concerns    Resource/Environmental Concerns none    Transportation Concerns none       Transition Planning    Patient/Family Anticipates Transition to home    Patient/Family Anticipated Services at Transition none    Transportation Anticipated family or friend will provide       Discharge Needs Assessment    Equipment Currently Used at Home none    Concerns to be Addressed no discharge needs identified    Anticipated Changes Related to Illness none    Equipment Needed After Discharge none                   Discharge Plan       Row Name 02/21/25 1739       Plan    Plan Home    Patient/Family in Agreement with Plan yes    Plan Comments Met with pt in room. Introduced self, explained  role, verified face sheet. Pt stated that his plan is to return home with his wife and child at discharge. His wife will provide transportation. He does not use any medical equipment at home. He denied any need for rehab, HH, or DME at this time. CCP following. Mookie KEVIN RN                  Continued Care and Services - Admitted Since 2/19/2025    No active coordination exists for this encounter.       Expected Discharge Date and Time       Expected Discharge Date Expected Discharge Time    Feb 22, 2025            Demographic Summary       Row Name 02/21/25 1738       General Information    Admission Type inpatient    Referral Source  admission list       Contact Information    Permission Granted to Share Info With                    Functional Status       Row Name 02/21/25 1738       Functional Status    Usual Activity Tolerance good    Current Activity Tolerance good                   Psychosocial    No documentation.                  Abuse/Neglect    No documentation.                  Legal    No documentation.                  Substance Abuse    No documentation.                  Patient Forms    No documentation.                     Mookie Miranda RN

## 2025-02-21 NOTE — PLAN OF CARE
Goal Outcome Evaluation:         Had right rib block and right chest tube today. No complaints. Safety maintained. Wife at bedside earlier. Tena FRANCOIS

## 2025-02-21 NOTE — PLAN OF CARE
Goal Outcome Evaluation:  Plan of Care Reviewed With: patient           Outcome Evaluation: Pt is a 53 yo M admitted with R 8th rib fx and hemothorax. Pt s/p CT placement and SANDRINE block, now with CT to . Pt lives with his wife and reports independence at BL with no AD. Pt presents to PT with minimal functional impairments, reports he feels at his BL mobility status. Pt transferred to EOB and stood independently. Pt ambulated 3 laps around unit with no AD and no assist from PT, carrying his chest tube. Pt requesting to use the bathroom on return to room, left in bathroom and encouraged to call out for assist if needed. Set up pt's chair for him and encouraged him to sit up after toileting. Pt is safe to be up ad eusebio - encouraged ambulation at least 3x/day and pt verbalized understanding. Pt with no further acute PT needs, will sign-off. Anticipate DC with family assist as needed.    Anticipated Discharge Disposition (PT): home with assist

## 2025-02-22 ENCOUNTER — APPOINTMENT (OUTPATIENT)
Dept: GENERAL RADIOLOGY | Facility: HOSPITAL | Age: 55
DRG: 205 | End: 2025-02-22
Payer: COMMERCIAL

## 2025-02-22 ENCOUNTER — READMISSION MANAGEMENT (OUTPATIENT)
Dept: CALL CENTER | Facility: HOSPITAL | Age: 55
End: 2025-02-22
Payer: COMMERCIAL

## 2025-02-22 VITALS
HEIGHT: 75 IN | WEIGHT: 315 LBS | OXYGEN SATURATION: 100 % | RESPIRATION RATE: 14 BRPM | BODY MASS INDEX: 39.17 KG/M2 | DIASTOLIC BLOOD PRESSURE: 78 MMHG | TEMPERATURE: 98.3 F | HEART RATE: 101 BPM | SYSTOLIC BLOOD PRESSURE: 131 MMHG

## 2025-02-22 LAB
ALBUMIN SERPL-MCNC: 2.8 G/DL (ref 3.5–5.2)
ALBUMIN/GLOB SERPL: 0.8 G/DL
ALP SERPL-CCNC: 66 U/L (ref 39–117)
ALT SERPL W P-5'-P-CCNC: 35 U/L (ref 1–41)
ANION GAP SERPL CALCULATED.3IONS-SCNC: 9.3 MMOL/L (ref 5–15)
AST SERPL-CCNC: 19 U/L (ref 1–40)
BILIRUB SERPL-MCNC: 0.6 MG/DL (ref 0–1.2)
BUN SERPL-MCNC: 10 MG/DL (ref 6–20)
BUN/CREAT SERPL: 18.9 (ref 7–25)
CALCIUM SPEC-SCNC: 8.3 MG/DL (ref 8.6–10.5)
CHLORIDE SERPL-SCNC: 103 MMOL/L (ref 98–107)
CO2 SERPL-SCNC: 25.7 MMOL/L (ref 22–29)
CREAT SERPL-MCNC: 0.53 MG/DL (ref 0.76–1.27)
DEPRECATED RDW RBC AUTO: 42.4 FL (ref 37–54)
EGFRCR SERPLBLD CKD-EPI 2021: 118.4 ML/MIN/1.73
ERYTHROCYTE [DISTWIDTH] IN BLOOD BY AUTOMATED COUNT: 13.6 % (ref 12.3–15.4)
GLOBULIN UR ELPH-MCNC: 3.3 GM/DL
GLUCOSE BLDC GLUCOMTR-MCNC: 106 MG/DL (ref 70–130)
GLUCOSE BLDC GLUCOMTR-MCNC: 113 MG/DL (ref 70–130)
GLUCOSE SERPL-MCNC: 113 MG/DL (ref 65–99)
HCT VFR BLD AUTO: 35.7 % (ref 37.5–51)
HGB BLD-MCNC: 11.3 G/DL (ref 13–17.7)
MCH RBC QN AUTO: 27.4 PG (ref 26.6–33)
MCHC RBC AUTO-ENTMCNC: 31.7 G/DL (ref 31.5–35.7)
MCV RBC AUTO: 86.4 FL (ref 79–97)
PLATELET # BLD AUTO: 444 10*3/MM3 (ref 140–450)
PMV BLD AUTO: 8.9 FL (ref 6–12)
POTASSIUM SERPL-SCNC: 4.3 MMOL/L (ref 3.5–5.2)
PROT SERPL-MCNC: 6.1 G/DL (ref 6–8.5)
RBC # BLD AUTO: 4.13 10*6/MM3 (ref 4.14–5.8)
SODIUM SERPL-SCNC: 138 MMOL/L (ref 136–145)
WBC NRBC COR # BLD AUTO: 12.59 10*3/MM3 (ref 3.4–10.8)

## 2025-02-22 PROCEDURE — 82948 REAGENT STRIP/BLOOD GLUCOSE: CPT

## 2025-02-22 PROCEDURE — 71045 X-RAY EXAM CHEST 1 VIEW: CPT

## 2025-02-22 PROCEDURE — 94799 UNLISTED PULMONARY SVC/PX: CPT

## 2025-02-22 PROCEDURE — 85027 COMPLETE CBC AUTOMATED: CPT

## 2025-02-22 PROCEDURE — 94761 N-INVAS EAR/PLS OXIMETRY MLT: CPT

## 2025-02-22 PROCEDURE — 99232 SBSQ HOSP IP/OBS MODERATE 35: CPT | Performed by: THORACIC SURGERY (CARDIOTHORACIC VASCULAR SURGERY)

## 2025-02-22 PROCEDURE — 94664 DEMO&/EVAL PT USE INHALER: CPT

## 2025-02-22 PROCEDURE — 80053 COMPREHEN METABOLIC PANEL: CPT | Performed by: INTERNAL MEDICINE

## 2025-02-22 RX ORDER — OXYCODONE HYDROCHLORIDE 5 MG/1
5 TABLET ORAL EVERY 4 HOURS PRN
Qty: 25 TABLET | Refills: 0 | Status: SHIPPED | OUTPATIENT
Start: 2025-02-22 | End: 2025-02-24 | Stop reason: SDUPTHER

## 2025-02-22 RX ORDER — ACETAMINOPHEN 500 MG
1000 TABLET ORAL 3 TIMES DAILY
Qty: 45 TABLET | Refills: 0 | Status: SHIPPED | OUTPATIENT
Start: 2025-02-22

## 2025-02-22 RX ORDER — GABAPENTIN 300 MG/1
300 CAPSULE ORAL 3 TIMES DAILY
Qty: 90 CAPSULE | Refills: 0 | Status: SHIPPED | OUTPATIENT
Start: 2025-02-22

## 2025-02-22 RX ORDER — DIAZEPAM 2 MG/1
2 TABLET ORAL EVERY 6 HOURS PRN
Qty: 10 TABLET | Refills: 0 | Status: SHIPPED | OUTPATIENT
Start: 2025-02-22 | End: 2025-02-24

## 2025-02-22 RX ORDER — BENZONATATE 100 MG/1
100 CAPSULE ORAL 3 TIMES DAILY PRN
Qty: 21 CAPSULE | Refills: 0 | Status: SHIPPED | OUTPATIENT
Start: 2025-02-22

## 2025-02-22 RX ADMIN — IPRATROPIUM BROMIDE AND ALBUTEROL SULFATE 3 ML: .5; 3 SOLUTION RESPIRATORY (INHALATION) at 11:15

## 2025-02-22 RX ADMIN — ACETAMINOPHEN 1000 MG: 500 TABLET, FILM COATED ORAL at 15:41

## 2025-02-22 RX ADMIN — ACETAMINOPHEN 1000 MG: 500 TABLET, FILM COATED ORAL at 09:03

## 2025-02-22 RX ADMIN — LIDOCAINE 1 PATCH: 4 PATCH TOPICAL at 09:05

## 2025-02-22 RX ADMIN — Medication 10 ML: at 09:08

## 2025-02-22 RX ADMIN — GUAIFENESIN 1200 MG: 600 TABLET, MULTILAYER, EXTENDED RELEASE ORAL at 09:04

## 2025-02-22 RX ADMIN — GABAPENTIN 300 MG: 300 CAPSULE ORAL at 09:03

## 2025-02-22 RX ADMIN — Medication 1 EACH: at 09:15

## 2025-02-22 RX ADMIN — IPRATROPIUM BROMIDE AND ALBUTEROL SULFATE 3 ML: .5; 3 SOLUTION RESPIRATORY (INHALATION) at 14:36

## 2025-02-22 RX ADMIN — PANTOPRAZOLE SODIUM 40 MG: 40 TABLET, DELAYED RELEASE ORAL at 06:00

## 2025-02-22 RX ADMIN — IPRATROPIUM BROMIDE AND ALBUTEROL SULFATE 3 ML: .5; 3 SOLUTION RESPIRATORY (INHALATION) at 07:52

## 2025-02-22 RX ADMIN — GABAPENTIN 300 MG: 300 CAPSULE ORAL at 15:41

## 2025-02-22 RX ADMIN — BENZONATATE 100 MG: 100 CAPSULE ORAL at 06:07

## 2025-02-22 RX ADMIN — Medication 1 EACH: at 11:45

## 2025-02-22 NOTE — PROGRESS NOTES
"    Chief Complaint: Hemothorax, right rib fracture  S/P Ct-Guided chest tube placement       Subjective:  Symptoms:  Improved.  He reports cough (Productive, yellow sputum).  No shortness of breath.    Diet:  Adequate intake.  No nausea or vomiting.    Activity level: Returning to normal.    Pain:  He complains of pain that is mild.  Pain is well controlled and requiring pain medication.    Pain better controlled today.  Denies any shortness of breath.  Ambulating halls with some assistance.    Vital Signs:  Temp:  [97.7 °F (36.5 °C)-99.6 °F (37.6 °C)] 98.3 °F (36.8 °C)  Heart Rate:  [] 101  Resp:  [14-18] 14  BP: (124-140)/(72-83) 131/78    Intake & Output (last day)         02/21 0701  02/22 0700 02/22 0701  02/23 0700    P.O. 240 480    Total Intake(mL/kg) 240 (1.7) 480 (3.4)    Urine (mL/kg/hr) 0 (0)     Chest Tube 200     Total Output 200     Net +40 +480          Urine Unmeasured Occurrence 3 x             Objective:  General Appearance:  Well-appearing, in no acute distress and comfortable.    Vital signs: (most recent): Blood pressure 131/78, pulse 101, temperature 98.3 °F (36.8 °C), temperature source Oral, resp. rate 14, height 190.5 cm (75\"), weight (!) 143 kg (315 lb), SpO2 100%.    Lungs:  Normal effort and normal respiratory rate.    Heart: Tachycardia.    Chest: Symmetric chest wall expansion. Chest wall tenderness (Around chest tube) present.    Abdomen: Abdomen is soft.    Neurological: Patient is alert and oriented to person, place and time.    Skin:  Warm and dry.                Chest tube:   Site: Right, Clean, Dry, and Intact  Suction: -20 cm  Air Leak: negative  24 Hour Total: 200 cc, serosanguinous     Results Review:     I reviewed the patient's new clinical results.  I reviewed the patient's new imaging results and agree with the interpretation.  Discussed with patient, nurse    Imaging Results (Last 24 Hours)       Procedure Component Value Units Date/Time    XR Chest 1 View " [[id number]] Collected: 02/22/25 0624     Updated: 02/22/25 0629    Narrative:      XR CHEST 1 VW-     HISTORY: Male who is 54 years-old, pleural effusion, follow-up     TECHNIQUE: Frontal view of the chest     COMPARISON: 2/21/2025     FINDINGS: Right chest tube appears stable. The heart appears borderline  enlarged. Pulmonary vasculature is unremarkable. Small likely  atelectasis at the bases. Minimal residual right pleural effusion is  suggested. No pneumothorax. Soft tissue gas is seen at the right chest  wall. Right hemidiaphragm remains elevated. No acute osseous process.       Impression:      No significant change.     This report was finalized on 2/22/2025 6:26 AM by Dr. Lavell Sanchez M.D on Workstation: [facility]               Lab Results:     Lab Results (last 24 hours)       Procedure Component Value Units Date/Time    POC Glucose Once [[id number]]  (Normal) Collected: 02/22/25 1102    Specimen: Blood Updated: 02/22/25 1103     Glucose 113 mg/dL     Comprehensive Metabolic Panel [[id number]]  (Abnormal) Collected: 02/22/25 0512    Specimen: Blood Updated: 02/22/25 0554     Glucose 113 mg/dL      BUN 10 mg/dL      Creatinine 0.53 mg/dL      Sodium 138 mmol/L      Potassium 4.3 mmol/L      Chloride 103 mmol/L      CO2 25.7 mmol/L      Calcium 8.3 mg/dL      Total Protein 6.1 g/dL      Albumin 2.8 g/dL      ALT (SGPT) 35 U/L      AST (SGOT) 19 U/L      Alkaline Phosphatase 66 U/L      Total Bilirubin 0.6 mg/dL      Globulin 3.3 gm/dL      A/G Ratio 0.8 g/dL      BUN/Creatinine Ratio 18.9     Anion Gap 9.3 mmol/L      eGFR 118.4 mL/min/1.73     Narrative:      GFR Categories in Chronic Kidney Disease (CKD)      GFR Category          GFR (mL/min/1.73)    Interpretation  G1                     90 or greater         Normal or high (1)  G2                      60-89                Mild decrease (1)  G3a                   45-59                Mild to moderate decrease  G3b                   30-44                 Moderate to severe decrease  G4                    15-29                Severe decrease  G5                    14 or less           Kidney failure          (1)In the absence of evidence of kidney disease, neither GFR category G1 or G2 fulfill the criteria for CKD.    eGFR calculation 2021 CKD-EPI creatinine equation, which does not include race as a factor    POC Glucose Once [[id number]]  (Normal) Collected: 02/22/25 0548    Specimen: Blood Updated: 02/22/25 0551     Glucose 106 mg/dL     CBC (No Diff) [[id number]]  (Abnormal) Collected: 02/22/25 0512    Specimen: Blood Updated: 02/22/25 0533     WBC 12.59 10*3/mm3      RBC 4.13 10*6/mm3      Hemoglobin 11.3 g/dL      Hematocrit 35.7 %      MCV 86.4 fL      MCH 27.4 pg      MCHC 31.7 g/dL      RDW 13.6 %      RDW-SD 42.4 fl      MPV 8.9 fL      Platelets 444 10*3/mm3     Potassium [[id number]]  (Normal) Collected: 02/21/25 2015    Specimen: Blood Updated: 02/21/25 2038     Potassium 4.1 mmol/L     POC Glucose Once [[id number]]  (Abnormal) Collected: 02/21/25 2028    Specimen: Blood Updated: 02/21/25 2029     Glucose 137 mg/dL              Assessment & Plan       Hemothorax on right    Gastroesophageal reflux disease without esophagitis    Chronic cough    Essential hypertension    Generalized anxiety disorder    Seasonal allergic rhinitis    Diabetes    Closed fracture of one rib       Assessment & Plan  Mr. Barreto is a pleasant 54-year-old gentleman s/p CT guided chest tube and SANDRINE block yesterday.  Pain better controlled today denies any shortness of breath and he is on room air.  A.m. chest x-ray improved.  Significant clearing of the right lower hemithorax.     A.m. chest x-ray looks reasonable.  His chest tube was removed at bedside today.  We will plan a chest x-ray around 530 this afternoon as long as that is negative for pneumothorax, he can be discharged home today.  Bobbi Huitron MD  [facility]  02/22/25  17:29 EST    Greater than 30  minutes was spent reviewing the patient's chart, radiographic imaging, labs, provider notes, assessing the patient and developing a plan of care.  This was discussed with the patient and RN.

## 2025-02-22 NOTE — DISCHARGE SUMMARY
Patient Name: Guevara Barreto  : 1970  MRN: [id number]    Date of Admission: 2025  Date of Discharge:  2025  Primary Care Physician: Bigg Hassan MD      Chief Complaint:   Abdominal Pain and Rib Pain      Discharge Diagnoses     Active Hospital Problems    Diagnosis  POA    **Hemothorax on right [J94.2]  Yes    Diabetes [E11.9]  Yes    Closed fracture of one rib [S22.39XA]  Yes    Chronic cough [R05.3]  Yes    Gastroesophageal reflux disease without esophagitis [K21.9]  Yes    Generalized anxiety disorder [F41.1]  Yes    Seasonal allergic rhinitis [J30.2]  Yes    Essential hypertension [I10]  Yes      Resolved Hospital Problems   No resolved problems to display.        Hospital Course     Mr. Barreto is a 54 y.o. male with a history of DM2 and HTN who presented to [facility] initially complaining of right flank and rib pain.  Please see the admitting history and physical for further details.  He had been coughing extensively due to recent UTI and felt a pop. He was confirmed to have right 8th rib fx and hemothorax. He was seen by thoracics and CT guided chest tube was placed and left to suction. Drainage has stopped and chest tube is being removed today and he will be discharged if CXR stable.      Day of Discharge     Subjective:  Feeling a lot better    Physical Exam:  Temp:  [97.7 °F (36.5 °C)-99.6 °F (37.6 °C)] 98.3 °F (36.8 °C)  Heart Rate:  [] 101  Resp:  [14-18] 14  BP: (124-140)/(72-83) 131/78  Body mass index is 39.37 kg/m².  Physical Exam  Vitals reviewed.   Constitutional:       General: He is not in acute distress.     Appearance: He is obese.   Cardiovascular:      Rate and Rhythm: Normal rate and regular rhythm.   Pulmonary:      Effort: No respiratory distress.      Breath sounds: Normal breath sounds.   Abdominal:      General: There is no distension.      Palpations: Abdomen is soft.      Tenderness: There is no abdominal tenderness.   Musculoskeletal:       Right lower leg: No edema.      Left lower leg: No edema.   Skin:     General: Skin is warm and dry.   Neurological:      Mental Status: He is alert and oriented to person, place, and time.   Psychiatric:         Mood and Affect: Mood normal.         Consultants     Consult Orders (all) (From admission, onward)       Start     Ordered    02/19/25 1420  Inpatient Anesthesia Pain Management Consult  Once        Specialty:  Pain Medicine  Provider:  Rosales De La Torre MD    02/19/25 1419    02/19/25 1332  [facility] (on-call MD unless specified) Details  Once        Specialty:  Hospitalist  Provider:  Luz Marina Dahl MD    02/19/25 1331    02/19/25 1324  General MD Inpatient Consult  Once        Provider:  (Not yet assigned)    02/19/25 1324                  Procedures       Imaging Results (All)       Procedure Component Value Units Date/Time    XR Chest 1 View [[id number]] Resulted: 02/22/25 1729     Updated: 02/22/25 1729    XR Chest 1 View [[id number]] Collected: 02/22/25 0624     Updated: 02/22/25 0629    Narrative:      XR CHEST 1 VW-     HISTORY: Male who is 54 years-old, pleural effusion, follow-up     TECHNIQUE: Frontal view of the chest     COMPARISON: 2/21/2025     FINDINGS: Right chest tube appears stable. The heart appears borderline  enlarged. Pulmonary vasculature is unremarkable. Small likely  atelectasis at the bases. Minimal residual right pleural effusion is  suggested. No pneumothorax. Soft tissue gas is seen at the right chest  wall. Right hemidiaphragm remains elevated. No acute osseous process.       Impression:      No significant change.     This report was finalized on 2/22/2025 6:26 AM by Dr. Lavell Sanchez M.D on Workstation: [id number]       XR Chest 1 View [[id number]] Collected: 02/21/25 0724     Updated: 02/21/25 0728    Narrative:      XR CHEST 1 VW-2/21/2025     HISTORY: Follow-up effusion.     Since yesterday's study there has been placement of a pigtail catheter  which  terminates over the base of the right hemithorax. The right lower  lung has largely cleared since the previous study. There is some  elevation of the right hemidiaphragm. There still may be some minimal  pleural fluid blunting the right costophrenic sulcus. No pneumothorax is  seen.     Heart size is mildly enlarged.       Impression:      1. Since yesterday's study there has been placement of a right pigtail  catheter with significant clearing of the right lower hemithorax.  2 there is still may be some minimal blunting of the right costophrenic  sulcus with minimal right pleural effusion.        This report was finalized on 2/21/2025 7:25 AM by Dr. Shan Pineda M.D on Workstation: [id number]       CT Guided Chest Tube [[id number]] Collected: 02/20/25 1327     Updated: 02/20/25 1355    Narrative:      CT GUIDED RIGHT CHEST TUBE PLACEMENT 02/20/2025     HISTORY: Right hemothorax.     After signed informed consent was obtained the patient was prepped and  draped in the left lateral decubitus position. Lidocaine was used for  local anesthesia.     5 [place] Chronon Systemseh catheter was introduced into the right hemothorax. Small  amount of sanguinous fluid was visualized. 035 wire was exchanged. This  was followed by placement of 8 [place], 10 [place] and 12 [place] dilators.  This was followed by placement of a 12 [place] catheter into the right  hemothorax. Approximately 20 cc of bloody fluid was aspirated. Fluid  sample was sent to the laboratory for evaluation.     The catheter was left in place and connected to atrium suction. The  procedure tolerated well with no complications.       Impression:      1. Successful CT-guided placement of 12 [place] catheter into right  hemothorax     Radiation dose reduction techniques were utilized, including automated  exposure control and exposure modulation based on body size.              This report was finalized on 2/20/2025 1:52 PM by Dr. Shan Pineda M.D on Workstation:  [id number]       XR Chest 1 View [[id number]] Collected: 02/20/25 0709     Updated: 02/20/25 0714    Narrative:      XR CHEST 1 VW-     DATE OF EXAM: 2/20/2025 5:32 AM     INDICATION: Follow-up right pleural effusion.     COMPARISON: CT studies 2/19/2025 and 2/17/2025.     TECHNIQUE: A single portable AP view of the chest was obtained.     FINDINGS:  Lordotic positioning. Overlying artifacts. Low lung volumes.  Similar-appearing opacification of the mid and basilar right lung,  likely reflecting the small to moderate right hemothorax and underlying  right perihilar and right basilar atelectasis, pulmonary contusion,  and/or pneumonia. Similar-appearing left basilar opacity correlating  with prominent epicardial fat on the prior CT study. No pneumothorax.  Unchanged cardiac and mediastinal contours. Known right eighth rib  fracture is obscured.       Impression:      Similar-appearing opacification of the mid and basilar right lung,  likely reflecting a small to moderate right hemothorax and underlying  right perihilar and right basilar atelectasis, pulmonary contusion,  and/or pneumonia.     This report was finalized on 2/20/2025 7:11 AM by Luis Bryson MD on  Workstation: [id number]       CT Angiogram Chest Pulmonary Embolism [[id number]] Collected: 02/19/25 1252     Updated: 02/19/25 1315    Narrative:      CT ANGIOGRAM CHEST PULMONARY EMBOLISM-, CT ABDOMEN PELVIS W CONTRAST-     DATE OF EXAM: 2/19/2025 12:11 PM     INDICATION: right anterior lateral and inferior rib pain, shortness of  breath. Right-sided abdominal pain and bruising     COMPARISON: CT abdomen pelvis 2/17/2025.     TECHNIQUE: Multiple contiguous axial images were acquired through the  chest, abdomen, and pelvis following the intravenous administration of  95 mL of Isovue-370. Reformatted coronal, sagittal, and 3D  reconstruction images were also reviewed. Radiation dose reduction  techniques were utilized, including automated exposure control  and  exposure modulation based on body size.     FINDINGS:  CTA chest:  The study is mild limited by suboptimal contrast bolus timing. No  evidence of a filling defect in the main or proximal segmental pulmonary  arterial branches to suggest pulmonary embolism. Evaluation of the more  distal segmental and subsegmental pulmonary arterial branches is  limited. Dense contrast remains in the right axillary, subclavian, and  brachiocephalic veins and the SVC. The heart and great vessels of the  chest are normal in size. No evidence of calcified coronary artery  disease. Trace pericardial fluid. No pathologically enlarged  intrathoracic lymph nodes are identified. Partially imaged enlarged  heterogeneous multinodular left lobe of the thyroid gland with  calcification. Tiny hiatal hernia.     Enlarged heterogeneous small to moderate right pleural effusion, likely  hemothorax, with associated compressive atelectasis of the right lower  lobe. Patchy groundglass opacities in the dependent and basilar right  lower lobe could represent atelectasis and/or pulmonary contusion. The  left lung is clear. Trace retained secretions in the right interlobar  bronchus.     Displaced fracture of the posterolateral right eighth rib. Adjacent  heterogeneous family high attenuation collection in the posterolateral  right chest wall, measuring approximately 7.5 cm x 2 cm (axial series 1  image 97), probable hematoma. Additional enlarged predominately high  attenuation collection in the posterolateral right chest wall between  the right eighth and ninth ribs, now measuring 8 cm x 2.5 cm (axial  series 1 image 114), also likely representing a hematoma. Mild fat  stranding in the right chest wall.     CT abdomen and pelvis:  Stable incompletely evaluated tiny subcentimeter low-density lesion in  the left lobe of the liver, probably benign cyst/hemangioma. The  gallbladder, spleen, pancreas, adrenal glands, and kidneys are  unremarkable. High  density contents of the otherwise unremarkable  appearing urinary bladder could represent hematuria or contrast.     Mild colorectal stool. No bowel obstruction or significant bowel wall  thickening. The appendix is normal.     No free fluid in the abdomen or pelvis. No free intraperitoneal air. No  pathologically enlarged lymph nodes in the abdomen or pelvis.     Mild stranding in the subcutaneous fat of the anterolateral right  abdominal wall. Postoperative changes in the right inguinal region with  surgical clips in place. Mild to moderate bilateral hip and SI joint DJD  with bilateral SI joint bridging osteophyte formation. Chronic appearing  bilateral L5 pars defects with associated minimal grade 1  anterolisthesis of L5 on S1 and mild multilevel lumbar spondylosis. No  acute osseous abnormality or concerning osseous lesion       Impression:         1. The study is negative for pulmonary embolism in the main or proximal  segmental pulmonary arterial branches. Evaluation of the more distal  segmental and subsegmental pulmonary arterial branches is limited no  evidence of right heart strain.  2. Displaced fracture of the posterolateral right eighth rib with high  attenuation collections in the posterolateral lower right chest wall  that likely represent hematomas.  3. Enlarged heterogeneous small to moderate right pleural effusion,  likely hemothorax, with associated compressive atelectasis of the right  lower lobe.  4. Patchy groundglass opacities in the dependent and basilar right lower  lobe could represent atelectasis or pulmonary contusion.  5. Mild stranding in the subcutaneous fat of the anterior and lateral  right abdominal wall.     This report was finalized on 2/19/2025 1:12 PM by Luis Bryson MD on  Workstation: [id number]       CT Abdomen Pelvis With Contrast [[id number]] Collected: 02/19/25 1252     Updated: 02/19/25 1315    Narrative:      CT ANGIOGRAM CHEST PULMONARY EMBOLISM-, CT ABDOMEN PELVIS  W CONTRAST-     DATE OF EXAM: 2/19/2025 12:11 PM     INDICATION: right anterior lateral and inferior rib pain, shortness of  breath. Right-sided abdominal pain and bruising     COMPARISON: CT abdomen pelvis 2/17/2025.     TECHNIQUE: Multiple contiguous axial images were acquired through the  chest, abdomen, and pelvis following the intravenous administration of  95 mL of Isovue-370. Reformatted coronal, sagittal, and 3D  reconstruction images were also reviewed. Radiation dose reduction  techniques were utilized, including automated exposure control and  exposure modulation based on body size.     FINDINGS:  CTA chest:  The study is mild limited by suboptimal contrast bolus timing. No  evidence of a filling defect in the main or proximal segmental pulmonary  arterial branches to suggest pulmonary embolism. Evaluation of the more  distal segmental and subsegmental pulmonary arterial branches is  limited. Dense contrast remains in the right axillary, subclavian, and  brachiocephalic veins and the SVC. The heart and great vessels of the  chest are normal in size. No evidence of calcified coronary artery  disease. Trace pericardial fluid. No pathologically enlarged  intrathoracic lymph nodes are identified. Partially imaged enlarged  heterogeneous multinodular left lobe of the thyroid gland with  calcification. Tiny hiatal hernia.     Enlarged heterogeneous small to moderate right pleural effusion, likely  hemothorax, with associated compressive atelectasis of the right lower  lobe. Patchy groundglass opacities in the dependent and basilar right  lower lobe could represent atelectasis and/or pulmonary contusion. The  left lung is clear. Trace retained secretions in the right interlobar  bronchus.     Displaced fracture of the posterolateral right eighth rib. Adjacent  heterogeneous family high attenuation collection in the posterolateral  right chest wall, measuring approximately 7.5 cm x 2 cm (axial series 1  image  97), probable hematoma. Additional enlarged predominately high  attenuation collection in the posterolateral right chest wall between  the right eighth and ninth ribs, now measuring 8 cm x 2.5 cm (axial  series 1 image 114), also likely representing a hematoma. Mild fat  stranding in the right chest wall.     CT abdomen and pelvis:  Stable incompletely evaluated tiny subcentimeter low-density lesion in  the left lobe of the liver, probably benign cyst/hemangioma. The  gallbladder, spleen, pancreas, adrenal glands, and kidneys are  unremarkable. High density contents of the otherwise unremarkable  appearing urinary bladder could represent hematuria or contrast.     Mild colorectal stool. No bowel obstruction or significant bowel wall  thickening. The appendix is normal.     No free fluid in the abdomen or pelvis. No free intraperitoneal air. No  pathologically enlarged lymph nodes in the abdomen or pelvis.     Mild stranding in the subcutaneous fat of the anterolateral right  abdominal wall. Postoperative changes in the right inguinal region with  surgical clips in place. Mild to moderate bilateral hip and SI joint DJD  with bilateral SI joint bridging osteophyte formation. Chronic appearing  bilateral L5 pars defects with associated minimal grade 1  anterolisthesis of L5 on S1 and mild multilevel lumbar spondylosis. No  acute osseous abnormality or concerning osseous lesion       Impression:         1. The study is negative for pulmonary embolism in the main or proximal  segmental pulmonary arterial branches. Evaluation of the more distal  segmental and subsegmental pulmonary arterial branches is limited no  evidence of right heart strain.  2. Displaced fracture of the posterolateral right eighth rib with high  attenuation collections in the posterolateral lower right chest wall  that likely represent hematomas.  3. Enlarged heterogeneous small to moderate right pleural effusion,  likely hemothorax, with associated  "compressive atelectasis of the right  lower lobe.  4. Patchy groundglass opacities in the dependent and basilar right lower  lobe could represent atelectasis or pulmonary contusion.  5. Mild stranding in the subcutaneous fat of the anterior and lateral  right abdominal wall.     This report was finalized on 2/19/2025 1:12 PM by Luis Bryson MD on  Workstation: [id number]                 Pertinent Labs     Results from last 7 days   Lab Units 02/22/25  0512 02/20/25  0532 02/19/25  1107 02/17/25  0928   WBC 10*3/mm3 12.59* 12.09* 10.69 6.49   HEMOGLOBIN g/dL 11.3* 12.4* 14.2 15.3   PLATELETS 10*3/mm3 444 467* 525* 488*     Results from last 7 days   Lab Units 02/22/25 0512 02/21/25 2015 02/21/25  0844 02/20/25  0532 02/19/25  1107   SODIUM mmol/L 138  --  134* 134* 136   POTASSIUM mmol/L 4.3 4.1 3.6 3.8 3.4*   CHLORIDE mmol/L 103  --  99 100 99   CO2 mmol/L 25.7  --  26.0 26.0 24.2   BUN mg/dL 10  --  11 10 9   CREATININE mg/dL 0.53*  --  0.54* 0.60* 0.71*   GLUCOSE mg/dL 113*  --  161* 110* 154*   EGFR mL/min/1.73 118.4  --  117.7 114.0 108.4     Results from last 7 days   Lab Units 02/22/25 0512 02/21/25  0844 02/20/25  0532 02/19/25  1107   ALBUMIN g/dL 2.8* 3.0* 3.1* 3.1*   BILIRUBIN mg/dL 0.6 0.8 0.8 0.6   ALK PHOS U/L 66 67 60 70   AST (SGOT) U/L 19 20 29 36   ALT (SGPT) U/L 35 38 51* 69*     Results from last 7 days   Lab Units 02/22/25  0512 02/21/25  0844 02/20/25  0532 02/19/25  1242 02/19/25  1107   CALCIUM mg/dL 8.3* 8.5* 8.3*  --  8.9   ALBUMIN g/dL 2.8* 3.0* 3.1*  --  3.1*   MAGNESIUM mg/dL  --   --  2.3 2.2  --    PHOSPHORUS mg/dL  --   --  2.4*  --   --        Results from last 7 days   Lab Units 02/19/25  1242 02/19/25  1107   HSTROP T ng/L 8 9   PROBNP pg/mL  --  <36.0           Invalid input(s): \"LDLCALC\"  Results from last 7 days   Lab Units 02/20/25  1150   BODYFLDCX  No growth     Results from last 7 days   Lab Units 02/19/25  1419   COVID19  Not Detected       Test Results Pending at " Discharge     Pending Results       Procedure [Order ID] Specimen - Date/Time    XR Chest 1 View [[id number]] Resulted: 02/22/25 1729     Updated: 02/22/25 1729    XR Chest 1 View [[id number]]     XR Chest 1 View [[id number]]               Discharge Details        Discharge Medications        New Medications        Instructions Start Date   acetaminophen 500 MG tablet  Commonly known as: TYLENOL   1,000 mg, Oral, 3 Times Daily      benzonatate 100 MG capsule  Commonly known as: TESSALON   100 mg, Oral, 3 Times Daily PRN      diazePAM 2 MG tablet  Commonly known as: VALIUM   2 mg, Oral, Every 6 Hours PRN      gabapentin 300 MG capsule  Commonly known as: NEURONTIN   300 mg, Oral, 3 Times Daily      oxyCODONE 5 MG immediate release tablet  Commonly known as: ROXICODONE   5 mg, Oral, Every 4 Hours PRN             Continue These Medications        Instructions Start Date   Farxiga 10 MG tablet  Generic drug: dapagliflozin Propanediol   10 mg, Daily      omeprazole 40 MG capsule  Commonly known as: priLOSEC   40 mg, Nightly      Rybelsus 3 MG tablet  Generic drug: Semaglutide   3 mg, Daily      telmisartan 40 MG tablet  Commonly known as: MICARDIS   40 mg, Nightly      VITAMIN C PO   1 tablet, Nightly      VITAMIN D-3 PO   1 tablet, Nightly      ZINC PO   1 tablet, Nightly               Allergies   Allergen Reactions    Penicillins Hives and Rash       Discharge Disposition:  Home or Self Care      Discharge Diet:  Diet Order   Procedures    Diet: Regular/House; Fluid Consistency: Thin (IDDSI 0)       Discharge Activity:       CODE STATUS:    Code Status and Medical Interventions: CPR (Attempt to Resuscitate); Full   Ordered at: 02/19/25 1611     Code Status (Patient has no pulse and is not breathing):    CPR (Attempt to Resuscitate)     Medical Interventions (Patient has pulse or is breathing):    Full       Future Appointments   Date Time Provider Department Center   2/24/2025  8:45 AM Bigg Hassan MD [facility]  [facility]      Follow-up Information       [facility] THORACIC SURGERY Follow up.    Specialty: Thoracic Surgery  Why: As needed  Contact information:  [address]  [place] [facility] [address] [phone]  Additional information:  PH: [phone]. Located in the Medical Offices [facility] at [address].             Bigg Hassan MD .    Specialty: Family Medicine  Contact information:  [address]  [facility] [address]  [place] [address]  [phone]               Bobbi Huitron MD Follow up.    Specialty: Thoracic Surgery  Why: As needed  Contact information:  [address]  [place] [address]  [phone]                             Time Spent on Discharge:  Greater than 30 minutes      Valentin Villa MD  [facility]  02/22/25  17:47 EST

## 2025-02-22 NOTE — PLAN OF CARE
Problem: Adult Inpatient Plan of Care  Goal: Plan of Care Review  Outcome: Progressing  Flowsheets (Taken 2/22/2025 7087)  Outcome Evaluation: chest tube con'ts to waterseal, medicated for cough, no distress noted   Goal Outcome Evaluation:              Outcome Evaluation: chest tube con'ts to waterseal, medicated for cough, no distress noted

## 2025-02-22 NOTE — PROGRESS NOTES
Name: Guevara Barreto ADMIT: 2025   : 1970  PCP: Bigg Hassan MD    MRN: [id number] LOS: 1 days   AGE/SEX: 54 y.o. male  ROOM: [facility]     Subjective   Subjective   Pain much better after nerve block. Less cough       Objective   Objective   Vital Signs  Temp:  [98.5 °F (36.9 °C)-99.6 °F (37.6 °C)] 99.6 °F (37.6 °C)  Heart Rate:  [] 106  Resp:  [16-18] 18  BP: (111-152)/(61-74) 124/74  SpO2:  [94 %-100 %] 95 %  on   ;   Device (Oxygen Therapy): room air  Body mass index is 39.37 kg/m².  Physical Exam  Vitals reviewed.   Constitutional:       General: He is not in acute distress.     Appearance: He is obese.   Cardiovascular:      Rate and Rhythm: Normal rate and regular rhythm.   Pulmonary:      Effort: No respiratory distress.      Breath sounds: Normal breath sounds.   Abdominal:      General: There is no distension.      Palpations: Abdomen is soft.      Tenderness: There is no abdominal tenderness.   Musculoskeletal:      Right lower leg: No edema.      Left lower leg: No edema.   Skin:     General: Skin is warm and dry.   Neurological:      Mental Status: He is alert and oriented to person, place, and time.   Psychiatric:         Mood and Affect: Mood normal.       Results Review     I reviewed the patient's new clinical results.  Results from last 7 days   Lab Units 25  0532 25  11025  0928   WBC 10*3/mm3 12.09* 10.69 6.49   HEMOGLOBIN g/dL 12.4* 14.2 15.3   PLATELETS 10*3/mm3 467* 525* 488*     Results from last 7 days   Lab Units 25  0844 25  0532 25  1107 25  0928   SODIUM mmol/L  --  134* 134* 136 140   POTASSIUM mmol/L 4.1 3.6 3.8 3.4* 3.6   CHLORIDE mmol/L  --  99 100 99 102   CO2 mmol/L  --  26.0 26.0 24.2 28.7   BUN mg/dL  --  11 10 9 9   CREATININE mg/dL  --  0.54* 0.60* 0.71* 0.70*   GLUCOSE mg/dL  --  161* 110* 154* 113*   EGFR mL/min/1.73  --  117.7 114.0 108.4 108.8     Results from last 7 days   Lab Units  02/21/25  0844 02/20/25  0532 02/19/25  1107 02/17/25  0928   ALBUMIN g/dL 3.0* 3.1* 3.1* 3.5   BILIRUBIN mg/dL 0.8 0.8 0.6 0.8   ALK PHOS U/L 67 60 70 70   AST (SGOT) U/L 20 29 36 63*   ALT (SGPT) U/L 38 51* 69* 96*     Results from last 7 days   Lab Units 02/21/25  0844 02/20/25  0532 02/19/25  1242 02/19/25  1107 02/17/25  0928   CALCIUM mg/dL 8.5* 8.3*  --  8.9 8.8   ALBUMIN g/dL 3.0* 3.1*  --  3.1* 3.5   MAGNESIUM mg/dL  --  2.3 2.2  --   --    PHOSPHORUS mg/dL  --  2.4*  --   --   --        Glucose   Date/Time Value Ref Range Status   02/21/2025 2028 137 (H) 70 - 130 mg/dL Final   02/21/2025 1615 215 (H) 70 - 130 mg/dL Final   02/21/2025 1143 224 (H) 70 - 130 mg/dL Final   02/21/2025 0550 108 70 - 130 mg/dL Final   02/20/2025 2029 131 (H) 70 - 130 mg/dL Final   02/20/2025 1649 107 70 - 130 mg/dL Final   02/20/2025 0623 126 70 - 130 mg/dL Final       XR Chest 1 View    Result Date: 2/21/2025  1. Since yesterday's study there has been placement of a right pigtail catheter with significant clearing of the right lower hemithorax. 2 there is still may be some minimal blunting of the right costophrenic sulcus with minimal right pleural effusion.   This report was finalized on 2/21/2025 7:25 AM by Dr. Shan Pineda M.D on Workstation: [id number]      CT Guided Chest Tube    Result Date: 2/20/2025  1. Successful CT-guided placement of 12 [place] catheter into right hemothorax  Radiation dose reduction techniques were utilized, including automated exposure control and exposure modulation based on body size.     This report was finalized on 2/20/2025 1:52 PM by Dr. Shan Pineda M.D on Workstation: [id number]      XR Chest 1 View    Result Date: 2/20/2025  Similar-appearing opacification of the mid and basilar right lung, likely reflecting a small to moderate right hemothorax and underlying right perihilar and right basilar atelectasis, pulmonary contusion, and/or pneumonia.  This report was finalized on 2/20/2025  7:11 AM by Luis Bryson MD on Workstation: [id number]       I have personally reviewed all medications:  Scheduled Medications  acetaminophen, 1,000 mg, Oral, TID  benzocaine-menthol, 1 lozenge, Mouth/Throat, 4x Daily  empagliflozin, 25 mg, Oral, Daily  fentanyl, 50 mcg, Intravenous, Once  gabapentin, 300 mg, Oral, TID  guaiFENesin, 1,200 mg, Oral, Q12H  insulin lispro, 2-9 Units, Subcutaneous, 4x Daily AC & at Bedtime  ipratropium-albuterol, 3 mL, Nebulization, 4x Daily - RT  lidocaine, 1 mL, Intradermal, Once  Lidocaine, 1 patch, Transdermal, Q24H  losartan, 50 mg, Oral, Nightly  pantoprazole, 40 mg, Oral, Q AM  Pedialyte, 1 each, Oral, Daily  Semaglutide, 3 mg, Oral, Daily  sodium chloride, 10 mL, Intravenous, Q12H    Infusions   Diet  Diet: Regular/House; Fluid Consistency: Thin (IDDSI 0)    I have personally reviewed:  [x]  Laboratory   [x]  Microbiology   [x]  Radiology   []  EKG/Telemetry  []  Cardiology/Vascular   []  Pathology    []  Records       Assessment/Plan     Active Hospital Problems    Diagnosis  POA    **Hemothorax on right [J94.2]  Yes    Diabetes [E11.9]  Yes    Closed fracture of one rib [S22.39XA]  Yes    Chronic cough [R05.3]  Yes    Gastroesophageal reflux disease without esophagitis [K21.9]  Yes    Generalized anxiety disorder [F41.1]  Yes    Seasonal allergic rhinitis [J30.2]  Yes    Essential hypertension [I10]  Yes      Resolved Hospital Problems   No resolved problems to display.       54 y.o. male admitted with Hemothorax on right due to rib fracture from cough    Hemothorax status post CT-guided chest tube placement and erector spinae nerve block  - Defer chest tube management to thoracic surgery.  CXR shows improvement, personally reviewed  - Prn Tessalon Perles for cough  - Symptomatic management with analgesics as needed and lidocaine patch.      DM2 relatively well-controlled presently.  Correctional insulin available as needed.  He is on Jardiance.    HTN stable      DVT  prophy: SCDs  Dispo: To be determined pending chest tube management.  Anticipate 1-2 days    Valentin Villa MD  [facility]  02/21/25  21:42 EST

## 2025-02-22 NOTE — PLAN OF CARE
Problem: Adult Inpatient Plan of Care  Goal: Plan of Care Review  Outcome: Progressing  Flowsheets  Taken 2/22/2025 1658 by Ruben Vences RN  Progress: improving  Outcome Evaluation: VSS. RA. CT out. Anticipate DC after xray later today.  Taken 2/21/2025 1406 by Korin Littlejohn RN  Plan of Care Reviewed With: patient  Goal: Patient-Specific Goal (Individualized)  Outcome: Progressing  Goal: Absence of Hospital-Acquired Illness or Injury  Outcome: Progressing  Intervention: Identify and Manage Fall Risk  Recent Flowsheet Documentation  Taken 2/22/2025 1600 by Ruben Vences RN  Safety Promotion/Fall Prevention:   assistive device/personal items within reach   clutter free environment maintained   nonskid shoes/slippers when out of bed   room organization consistent   safety round/check completed  Taken 2/22/2025 1144 by Ruben Vences RN  Safety Promotion/Fall Prevention:   assistive device/personal items within reach   clutter free environment maintained   nonskid shoes/slippers when out of bed   room organization consistent   safety round/check completed  Taken 2/22/2025 1000 by Ruben Vences RN  Safety Promotion/Fall Prevention:   assistive device/personal items within reach   clutter free environment maintained   nonskid shoes/slippers when out of bed   room organization consistent   safety round/check completed  Taken 2/22/2025 0850 by Ruben Vences RN  Safety Promotion/Fall Prevention:   assistive device/personal items within reach   clutter free environment maintained   nonskid shoes/slippers when out of bed   room organization consistent   safety round/check completed  Intervention: Prevent Skin Injury  Recent Flowsheet Documentation  Taken 2/22/2025 0850 by Ruben Vences RN  Body Position: position changed independently  Goal: Optimal Comfort and Wellbeing  Outcome: Progressing  Goal: Readiness for Transition of Care  Outcome: Progressing     Problem: Comorbidity Management  Goal:  Maintenance of Asthma Control  Outcome: Progressing  Goal: Maintenance of Behavioral Health Symptom Control  Outcome: Progressing  Goal: Maintenance of COPD Symptom Control  Outcome: Progressing  Goal: Blood Glucose Level Within Target Range  Outcome: Progressing  Goal: Maintenance of Heart Failure Symptom Control  Outcome: Progressing  Goal: Blood Pressure in Desired Range  Outcome: Progressing  Goal: Maintenance of Osteoarthritis Symptom Control  Outcome: Progressing  Intervention: Maintain Osteoarthritis Symptom Control  Recent Flowsheet Documentation  Taken 2/22/2025 1600 by Ruben Vences RN  Activity Management: up ad eusebio  Taken 2/22/2025 1144 by Ruben Vences RN  Activity Management: up ad eusebio  Taken 2/22/2025 1000 by Ruben Vences RN  Activity Management: up ad eusebio  Taken 2/22/2025 0850 by Ruben Vences RN  Activity Management: up ad eusebio  Goal: Bariatric Home Regimen Maintained  Outcome: Progressing  Goal: Maintenance of Seizure Control  Outcome: Progressing     Problem: Fall Injury Risk  Goal: Absence of Fall and Fall-Related Injury  Outcome: Progressing  Intervention: Promote Injury-Free Environment  Recent Flowsheet Documentation  Taken 2/22/2025 1600 by Ruben Vences RN  Safety Promotion/Fall Prevention:   assistive device/personal items within reach   clutter free environment maintained   nonskid shoes/slippers when out of bed   room organization consistent   safety round/check completed  Taken 2/22/2025 1144 by Ruben Vences RN  Safety Promotion/Fall Prevention:   assistive device/personal items within reach   clutter free environment maintained   nonskid shoes/slippers when out of bed   room organization consistent   safety round/check completed  Taken 2/22/2025 1000 by Ruben Vences RN  Safety Promotion/Fall Prevention:   assistive device/personal items within reach   clutter free environment maintained   nonskid shoes/slippers when out of bed   room organization consistent    safety round/check completed  Taken 2/22/2025 0850 by Ruben Vences, RN  Safety Promotion/Fall Prevention:   assistive device/personal items within reach   clutter free environment maintained   nonskid shoes/slippers when out of bed   room organization consistent   safety round/check completed   Goal Outcome Evaluation:           Progress: improving  Outcome Evaluation: KATHARINA WOLFF out. Anticipate DC after xray later today.

## 2025-02-23 LAB
BACTERIA FLD CULT: NORMAL
GRAM STN SPEC: NORMAL
GRAM STN SPEC: NORMAL

## 2025-02-23 NOTE — CASE MANAGEMENT/SOCIAL WORK
Case Management Discharge Note      Final Note: Pt discharged home on 2/22.   RACHAEL Marquis RN         Selected Continued Care - Discharged on 2/22/2025 Admission date: 2/19/2025 - Discharge disposition: Home or Self Care      Destination    No services have been selected for the patient.                Durable Medical Equipment    No services have been selected for the patient.                Dialysis/Infusion    No services have been selected for the patient.                Home Medical Care    No services have been selected for the patient.                Therapy    No services have been selected for the patient.                Community Resources    No services have been selected for the patient.                Community & DME    No services have been selected for the patient.                    Transportation Services  Private: Car    Final Discharge Disposition Code: 01 - home or self-care

## 2025-02-23 NOTE — OUTREACH NOTE
Prep Survey      Flowsheet Row Responses   [facility] patient discharged from? [place]   Is LACE score < 7 ? No   Eligibility [facility]   Date of Admission 02/19/25   Date of Discharge 02/22/25   Discharge Disposition Home or Self Care   Discharge diagnosis Hemothorax on right   Does the patient have one of the following disease processes/diagnoses(primary or secondary)? Other   Does the patient have Home health ordered? No   Is there a DME ordered? No   Prep survey completed? Yes            MARIA ELENA A - Registered Nurse

## 2025-02-24 ENCOUNTER — TRANSITIONAL CARE MANAGEMENT TELEPHONE ENCOUNTER (OUTPATIENT)
Dept: CALL CENTER | Facility: HOSPITAL | Age: 55
End: 2025-02-24
Payer: COMMERCIAL

## 2025-02-24 ENCOUNTER — OFFICE VISIT (OUTPATIENT)
Age: 55
End: 2025-02-24
Payer: COMMERCIAL

## 2025-02-24 ENCOUNTER — PRIOR AUTHORIZATION (OUTPATIENT)
Age: 55
End: 2025-02-24

## 2025-02-24 ENCOUNTER — TELEPHONE (OUTPATIENT)
Age: 55
End: 2025-02-24

## 2025-02-24 VITALS
SYSTOLIC BLOOD PRESSURE: 134 MMHG | DIASTOLIC BLOOD PRESSURE: 76 MMHG | WEIGHT: 315 LBS | TEMPERATURE: 98 F | HEART RATE: 120 BPM | HEIGHT: 75 IN | RESPIRATION RATE: 14 BRPM | OXYGEN SATURATION: 95 % | BODY MASS INDEX: 39.17 KG/M2

## 2025-02-24 DIAGNOSIS — J94.2 HEMOTHORAX ON RIGHT: Primary | ICD-10-CM

## 2025-02-24 DIAGNOSIS — S22.31XA CLOSED FRACTURE OF ONE RIB OF RIGHT SIDE, INITIAL ENCOUNTER: ICD-10-CM

## 2025-02-24 DIAGNOSIS — S22.31XK CLOSED FRACTURE OF ONE RIB OF RIGHT SIDE WITH NONUNION, SUBSEQUENT ENCOUNTER: ICD-10-CM

## 2025-02-24 RX ORDER — OXYCODONE HYDROCHLORIDE 5 MG/1
5 TABLET ORAL EVERY 4 HOURS PRN
Qty: 25 TABLET | Refills: 0 | Status: SHIPPED | OUTPATIENT
Start: 2025-02-24 | End: 2025-02-28

## 2025-02-24 NOTE — OUTREACH NOTE
Call Center TCM Note      Flowsheet Row Responses   [facility] patient discharged from? [place]   Does the patient have one of the following disease processes/diagnoses(primary or secondary)? Other   TCM attempt successful? Yes   Call start time 1615   Call end time 1619   Discharge diagnosis Hemothorax on right   Person spoke with today (if not patient) and relationship pt   Meds reviewed with patient/caregiver? Yes   Is the patient having any side effects they believe may be caused by any medication additions or changes? No   Does the patient have all medications ordered at discharge? Yes   Is the patient taking all medications as directed (includes completed medication regime)? Yes   Does the patient have an appointment with their PCP within 7-14 days of discharge? Yes   Psychosocial issues? No   Did the patient receive a copy of their discharge instructions? Yes   Nursing interventions Reviewed instructions with patient   What is the patient's perception of their health status since discharge? Improving   Is the patient/caregiver able to teach back signs and symptoms related to disease process for when to call PCP? Yes   Is the patient/caregiver able to teach back signs and symptoms related to disease process for when to call 911? Yes   Is the patient/caregiver able to teach back the hierarchy of who to call/visit for symptoms/problems? PCP, Specialist, Home health nurse, Urgent Care, ED, 911 Yes   If the patient is a current smoker, are they able to teach back resources for cessation? Not a smoker   TCM call completed? Yes   Wrap up additional comments Pt reports ongoing right side rib pain. Reviewed meds with pt,  pt is waiting for [facility] to fill oxycodone,  pt shares [facility] pharmacy did not have Oxycodone. Pt had PCP fu appt today.   Call end time 1619   Would this patient benefit from a Referral to Amb Social Work? No   Is the patient interested in additional calls from an ambulatory ? No             Christie Osorio RN    2/24/2025, 16:21 EST

## 2025-02-24 NOTE — PROGRESS NOTES
Transitional Care Follow Up Visit  Subjective     Guevara Barreto is a 54 y.o. male who presents for a transitional care management visit.    Within 48 business hours after discharge our office contacted him via telephone to coordinate his care and needs.      I reviewed and discussed the details of that call along with the discharge summary, hospital problems, inpatient lab results, inpatient diagnostic studies, and consultation reports with Guevara.     Current outpatient and discharge medications have been reconciled for the patient.  Reviewed by: Iris Etienne MA          2/22/2025    11:33 PM   Date of TCM Phone Call   [facility]   Date of Admission 2/19/2025   Date of Discharge 2/22/2025   Discharge Disposition Home or Self Care     Risk for Readmission (LACE) Score: 8 (2/22/2025  6:00 AM)      History of Present Illness   Course During Hospital Stay:  Mr. Barreto is a 54 y.o. male initially complaining of right flank and rib pain.  He had been coughing extensively due to recent UTI and felt a pop. He was confirmed to have right 8th rib fx and hemothorax. He was seen by thoracics and CT guided chest tube was placed and left to suction. Drainage stopped and the chest tube was removed.     {Common H&P Review Areas:45590}    Review of Systems    Objective   There were no vitals taken for this visit.  Physical Exam    Assessment & Plan   {Assess/PlanSmartLinks:72931}

## 2025-02-24 NOTE — PROGRESS NOTES
Transitional Care Follow Up Visit  Subjective     Guevara Barreto is a 54 y.o. male who presents for a transitional care management visit.    Within 48 business hours after discharge our office contacted him via telephone to coordinate his care and needs.      I reviewed and discussed the details of that call along with the discharge summary, hospital problems, inpatient lab results, inpatient diagnostic studies, and consultation reports with Guevara.     Current outpatient and discharge medications have been reconciled for the patient.  Reviewed by: Bigg Hassan MD          2/22/2025    11:33 PM   Date of TCM Phone Call   [facility]   Date of Admission 2/19/2025   Date of Discharge 2/22/2025   Discharge Disposition Home or Self Care     Risk for Readmission (LACE) Score: 8 (2/22/2025  6:00 AM)      History of Present Illness   Course During Hospital Stay:  Course During Hospital Stay:  Mr. Barreto is a 54 y.o. male initially complaining of right flank and rib pain.  He had been coughing extensively due to recent UTI and felt a pop. He was confirmed to have right 8th rib fx and hemothorax. He was seen by thoracics and CT guided chest tube was placed and left to suction. Drainage stopped and the chest tube was removed.       The patient is a 54-year-old male who presents for follow-up from a recent hospital stay.    He was recently hospitalized due to a severe coughing episode, which resulted in a displaced fracture of the 8th rib on the right side. This complication was further complicated by a hemothorax, necessitating the placement of a chest tube. His hospital stay extended from Wednesday to Saturday evening, during which the chest tube was subsequently removed. He continues to experience productive cough with mucus expectoration, accompanied by significant pain. He was discharged with a prescription for Percocet, but it remains unfilled. Initially, he was provided with a medication to suppress his  "cough, which proved beneficial. However, upon returning to the hospital, he was informed that oxycodone was unavailable. He has not yet consulted a pulmonologist. He reports no febrile episodes or signs of infection. His current routine involves resting in bed, with occasional periods of sitting on the couch at night, using a back warmer for comfort. He is an active person and is eager to resume physical activity, particularly walking. He is seeking advice on the number of steps he should aim for daily. He is also curious about the duration of his work absence. He was administered Valium during his hospital stay but has not continued its use post-discharge.    MEDICATIONS  Current: Oxycodone, diazepam (Valium).        The following portions of the patient's history were reviewed and updated as appropriate: allergies, current medications, past family history, past medical history, past social history, past surgical history, and problem list.    Review of Systems    Objective   /76 (BP Location: Left arm, Patient Position: Sitting, Cuff Size: Adult)   Pulse 120   Temp 98 °F (36.7 °C) (Temporal)   Resp 14   Ht 190.5 cm (75\")   Wt (!) 144 kg (318 lb)   SpO2 95%   BMI 39.75 kg/m²   Physical Exam  Constitutional:       General: He is not in acute distress.     Appearance: Normal appearance. He is not ill-appearing, toxic-appearing or diaphoretic.   HENT:      Head: Normocephalic and atraumatic.      Right Ear: There is no impacted cerumen.      Left Ear: There is no impacted cerumen.      Nose: No congestion or rhinorrhea.      Mouth/Throat:      Pharynx: Oropharynx is clear. No oropharyngeal exudate or posterior oropharyngeal erythema.   Eyes:      General: No scleral icterus.        Right eye: No discharge.         Left eye: No discharge.      Extraocular Movements: Extraocular movements intact.      Conjunctiva/sclera: Conjunctivae normal.      Pupils: Pupils are equal, round, and reactive to light. "   Cardiovascular:      Rate and Rhythm: Normal rate and regular rhythm.      Pulses: Normal pulses.      Heart sounds: Normal heart sounds.   Pulmonary:      Effort: Pulmonary effort is normal.      Breath sounds: Normal breath sounds.   Abdominal:      General: Abdomen is flat. Bowel sounds are normal.      Palpations: Abdomen is soft.   Musculoskeletal:         General: Normal range of motion.      Cervical back: Normal range of motion and neck supple.   Skin:     General: Skin is warm.   Neurological:      General: No focal deficit present.      Mental Status: He is alert and oriented to person, place, and time. Mental status is at baseline.   Psychiatric:         Mood and Affect: Mood normal.         Behavior: Behavior normal.         Thought Content: Thought content normal.         Judgment: Judgment normal.            Lungs are clear.       Results  Imaging  Chest x-ray showed some elevation in the right hemidiaphragm.       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 Right-sided hemothorax.  He was hospitalized for 4 days due to a right-sided hemothorax, which required a chest tube placement. The last chest x-ray showed an elevated right hemidiaphragm, but no signs of pulmonary embolism were detected. He is advised to avoid heavy lifting and strenuous exercise, including martial arts, to  prevent further injury. He is encouraged to engage in activities that promote lung expansion, such as walking, deep breathing exercises, and using an incentive spirometer. Controlled coughing and early mobilization are also recommended. A repeat chest x-ray will be ordered to monitor his progress. A prescription for oxycodone will be provided for pain management, to be taken as needed. He is cautioned against driving while on this medication.    2. 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  He sustained a closed fracture of the right eighth rib, which has not yet achieved union. He is advised to continue avoiding heavy lifting and strenuous activities to allow the fracture to heal properly. Pain management with oxycodone will also help suppress coughing, which can exacerbate the pain.    Follow-up  The patient will follow up in 2 weeks.    PROCEDURE  The patient had a chest tube placed during a recent hospital stay due to a hemothorax. The chest tube was subsequently removed.              Patient or patient representative verbalized consent for the use of Ambient Listening during the visit with  Bigg Hassan MD for chart documentation. 2/24/2025  09:10 EST

## 2025-02-24 NOTE — PAYOR COMM NOTE
"Daria Wagner (54 y.o. Male)    PLEASE SEE ATTACHED FOR DC NOTICE   REF#[id number]   THANK YOU  HARMEET JOINER RN/ DEPT   [facility]  PH: [phone]  FAX:  [phone]     Date of Birth   1970    Social Security Number       Address   [address] [place] [address]    Home Phone   [phone]    MRN   [id number]       Hindu   Worship    Marital Status                               Admission Date   25    Admission Type   Emergency    Admitting Provider   Luz Marina Dahl MD    Attending Provider       Department, Room/Bed   [facility], E557/1       Discharge Date   2025    Discharge Disposition   Home or Self Care    Discharge Destination   Home                              Attending Provider: (none)   Allergies: Penicillins    Isolation: None   Infection: MRSA/History Only (25)   Code Status: Prior    Ht: 190.5 cm (75\")   Wt: 143 kg (315 lb)    Admission Cmt: None   Principal Problem: Hemothorax on right [J94.2]                   Active Insurance as of 2025       Primary Coverage       Payor Plan Insurance Group Employer/Plan Group    ANTHEM BLUE CROSS ANTHEM BLUE CROSS BLUE SHIELD PPO [id number]       Payor Plan Address Payor Plan Phone Number Payor Plan Fax Number Effective Dates    [address] [phone]  2015 - None Entered    [place] [address]         Subscriber Name Subscriber Birth Date Member ID       DARIA WAGNER 1970 [id number]                     Emergency Contacts        (Rel.) Home Phone Work Phone Mobile Phone    Emily Wagner (Spouse) [phone] -- [phone]              [place]: [facility] [id number]  Tax ID [id number]     Discharge Summary        Valentin Villa MD at 25 1217              Patient Name: Daria Wagner  : 1970  MRN: [id number]    Date of Admission: 2025  Date of Discharge:  2025  Primary Care Physician: Mo" Bigg GORDILLO MD      Chief Complaint:   Abdominal Pain and Rib Pain      Discharge Diagnoses     Active Hospital Problems    Diagnosis  POA    **Hemothorax on right [J94.2]  Yes    Diabetes [E11.9]  Yes    Closed fracture of one rib [S22.39XA]  Yes    Chronic cough [R05.3]  Yes    Gastroesophageal reflux disease without esophagitis [K21.9]  Yes    Generalized anxiety disorder [F41.1]  Yes    Seasonal allergic rhinitis [J30.2]  Yes    Essential hypertension [I10]  Yes      Resolved Hospital Problems   No resolved problems to display.        Hospital Course     Mr. Barreto is a 54 y.o. male with a history of DM2 and HTN who presented to [facility] initially complaining of right flank and rib pain.  Please see the admitting history and physical for further details.  He had been coughing extensively due to recent UTI and felt a pop. He was confirmed to have right 8th rib fx and hemothorax. He was seen by thoracics and CT guided chest tube was placed and left to suction. Drainage has stopped and chest tube is being removed today and he will be discharged if CXR stable.      Day of Discharge     Subjective:  Feeling a lot better    Physical Exam:  Temp:  [97.7 °F (36.5 °C)-99.6 °F (37.6 °C)] 98.3 °F (36.8 °C)  Heart Rate:  [] 101  Resp:  [14-18] 14  BP: (124-140)/(72-83) 131/78  Body mass index is 39.37 kg/m².  Physical Exam  Vitals reviewed.   Constitutional:       General: He is not in acute distress.     Appearance: He is obese.   Cardiovascular:      Rate and Rhythm: Normal rate and regular rhythm.   Pulmonary:      Effort: No respiratory distress.      Breath sounds: Normal breath sounds.   Abdominal:      General: There is no distension.      Palpations: Abdomen is soft.      Tenderness: There is no abdominal tenderness.   Musculoskeletal:      Right lower leg: No edema.      Left lower leg: No edema.   Skin:     General: Skin is warm and dry.   Neurological:      Mental Status: He is alert and  oriented to person, place, and time.   Psychiatric:         Mood and Affect: Mood normal.         Consultants     Consult Orders (all) (From admission, onward)       Start     Ordered    02/19/25 1420  Inpatient Anesthesia Pain Management Consult  Once        Specialty:  Pain Medicine  Provider:  Rosales De La Torre MD    02/19/25 1419    02/19/25 1332  [facility] (on-call MD unless specified) Details  Once        Specialty:  Hospitalist  Provider:  Luz Marina Dahl MD    02/19/25 1331    02/19/25 1324  General MD Inpatient Consult  Once        Provider:  (Not yet assigned)    02/19/25 1324                  Procedures       Imaging Results (All)       Procedure Component Value Units Date/Time    XR Chest 1 View [[id number]] Resulted: 02/22/25 1729     Updated: 02/22/25 1729    XR Chest 1 View [[id number]] Collected: 02/22/25 0624     Updated: 02/22/25 0629    Narrative:      XR CHEST 1 VW-     HISTORY: Male who is 54 years-old, pleural effusion, follow-up     TECHNIQUE: Frontal view of the chest     COMPARISON: 2/21/2025     FINDINGS: Right chest tube appears stable. The heart appears borderline  enlarged. Pulmonary vasculature is unremarkable. Small likely  atelectasis at the bases. Minimal residual right pleural effusion is  suggested. No pneumothorax. Soft tissue gas is seen at the right chest  wall. Right hemidiaphragm remains elevated. No acute osseous process.       Impression:      No significant change.     This report was finalized on 2/22/2025 6:26 AM by Dr. Lavell Sanchez M.D on Workstation: [id number]       XR Chest 1 View [[id number]] Collected: 02/21/25 0724     Updated: 02/21/25 0728    Narrative:      XR CHEST 1 VW-2/21/2025     HISTORY: Follow-up effusion.     Since yesterday's study there has been placement of a pigtail catheter  which terminates over the base of the right hemithorax. The right lower  lung has largely cleared since the previous study. There is some  elevation of the right  hemidiaphragm. There still may be some minimal  pleural fluid blunting the right costophrenic sulcus. No pneumothorax is  seen.     Heart size is mildly enlarged.       Impression:      1. Since yesterday's study there has been placement of a right pigtail  catheter with significant clearing of the right lower hemithorax.  2 there is still may be some minimal blunting of the right costophrenic  sulcus with minimal right pleural effusion.        This report was finalized on 2/21/2025 7:25 AM by Dr. Shan Pineda M.D on Workstation: [id number]       CT Guided Chest Tube [[id number]] Collected: 02/20/25 1327     Updated: 02/20/25 1355    Narrative:      CT GUIDED RIGHT CHEST TUBE PLACEMENT 02/20/2025     HISTORY: Right hemothorax.     After signed informed consent was obtained the patient was prepped and  draped in the left lateral decubitus position. Lidocaine was used for  local anesthesia.     5 [place].comeh catheter was introduced into the right hemothorax. Small  amount of sanguinous fluid was visualized. 035 wire was exchanged. This  was followed by placement of 8 Georgian, 10 Georgian and 12 Georgian dilators.  This was followed by placement of a 12 [place] catheter into the right  hemothorax. Approximately 20 cc of bloody fluid was aspirated. Fluid  sample was sent to the laboratory for evaluation.     The catheter was left in place and connected to atrium suction. The  procedure tolerated well with no complications.       Impression:      1. Successful CT-guided placement of 12 [place] catheter into right  hemothorax     Radiation dose reduction techniques were utilized, including automated  exposure control and exposure modulation based on body size.              This report was finalized on 2/20/2025 1:52 PM by Dr. Shan Pineda M.D on Workstation: [id number]       XR Chest 1 View [[id number]] Collected: 02/20/25 0709     Updated: 02/20/25 0714    Narrative:      XR CHEST 1 VW-     DATE OF EXAM: 2/20/2025 5:32  AM     INDICATION: Follow-up right pleural effusion.     COMPARISON: CT studies 2/19/2025 and 2/17/2025.     TECHNIQUE: A single portable AP view of the chest was obtained.     FINDINGS:  Lordotic positioning. Overlying artifacts. Low lung volumes.  Similar-appearing opacification of the mid and basilar right lung,  likely reflecting the small to moderate right hemothorax and underlying  right perihilar and right basilar atelectasis, pulmonary contusion,  and/or pneumonia. Similar-appearing left basilar opacity correlating  with prominent epicardial fat on the prior CT study. No pneumothorax.  Unchanged cardiac and mediastinal contours. Known right eighth rib  fracture is obscured.       Impression:      Similar-appearing opacification of the mid and basilar right lung,  likely reflecting a small to moderate right hemothorax and underlying  right perihilar and right basilar atelectasis, pulmonary contusion,  and/or pneumonia.     This report was finalized on 2/20/2025 7:11 AM by Luis Bryson MD on  Workstation: [id number]       CT Angiogram Chest Pulmonary Embolism [[id number]] Collected: 02/19/25 1252     Updated: 02/19/25 1315    Narrative:      CT ANGIOGRAM CHEST PULMONARY EMBOLISM-, CT ABDOMEN PELVIS W CONTRAST-     DATE OF EXAM: 2/19/2025 12:11 PM     INDICATION: right anterior lateral and inferior rib pain, shortness of  breath. Right-sided abdominal pain and bruising     COMPARISON: CT abdomen pelvis 2/17/2025.     TECHNIQUE: Multiple contiguous axial images were acquired through the  chest, abdomen, and pelvis following the intravenous administration of  95 mL of Isovue-370. Reformatted coronal, sagittal, and 3D  reconstruction images were also reviewed. Radiation dose reduction  techniques were utilized, including automated exposure control and  exposure modulation based on body size.     FINDINGS:  CTA chest:  The study is mild limited by suboptimal contrast bolus timing. No  evidence of a filling defect  in the main or proximal segmental pulmonary  arterial branches to suggest pulmonary embolism. Evaluation of the more  distal segmental and subsegmental pulmonary arterial branches is  limited. Dense contrast remains in the right axillary, subclavian, and  brachiocephalic veins and the SVC. The heart and great vessels of the  chest are normal in size. No evidence of calcified coronary artery  disease. Trace pericardial fluid. No pathologically enlarged  intrathoracic lymph nodes are identified. Partially imaged enlarged  heterogeneous multinodular left lobe of the thyroid gland with  calcification. Tiny hiatal hernia.     Enlarged heterogeneous small to moderate right pleural effusion, likely  hemothorax, with associated compressive atelectasis of the right lower  lobe. Patchy groundglass opacities in the dependent and basilar right  lower lobe could represent atelectasis and/or pulmonary contusion. The  left lung is clear. Trace retained secretions in the right interlobar  bronchus.     Displaced fracture of the posterolateral right eighth rib. Adjacent  heterogeneous family high attenuation collection in the posterolateral  right chest wall, measuring approximately 7.5 cm x 2 cm (axial series 1  image 97), probable hematoma. Additional enlarged predominately high  attenuation collection in the posterolateral right chest wall between  the right eighth and ninth ribs, now measuring 8 cm x 2.5 cm (axial  series 1 image 114), also likely representing a hematoma. Mild fat  stranding in the right chest wall.     CT abdomen and pelvis:  Stable incompletely evaluated tiny subcentimeter low-density lesion in  the left lobe of the liver, probably benign cyst/hemangioma. The  gallbladder, spleen, pancreas, adrenal glands, and kidneys are  unremarkable. High density contents of the otherwise unremarkable  appearing urinary bladder could represent hematuria or contrast.     Mild colorectal stool. No bowel obstruction or  significant bowel wall  thickening. The appendix is normal.     No free fluid in the abdomen or pelvis. No free intraperitoneal air. No  pathologically enlarged lymph nodes in the abdomen or pelvis.     Mild stranding in the subcutaneous fat of the anterolateral right  abdominal wall. Postoperative changes in the right inguinal region with  surgical clips in place. Mild to moderate bilateral hip and SI joint DJD  with bilateral SI joint bridging osteophyte formation. Chronic appearing  bilateral L5 pars defects with associated minimal grade 1  anterolisthesis of L5 on S1 and mild multilevel lumbar spondylosis. No  acute osseous abnormality or concerning osseous lesion       Impression:         1. The study is negative for pulmonary embolism in the main or proximal  segmental pulmonary arterial branches. Evaluation of the more distal  segmental and subsegmental pulmonary arterial branches is limited no  evidence of right heart strain.  2. Displaced fracture of the posterolateral right eighth rib with high  attenuation collections in the posterolateral lower right chest wall  that likely represent hematomas.  3. Enlarged heterogeneous small to moderate right pleural effusion,  likely hemothorax, with associated compressive atelectasis of the right  lower lobe.  4. Patchy groundglass opacities in the dependent and basilar right lower  lobe could represent atelectasis or pulmonary contusion.  5. Mild stranding in the subcutaneous fat of the anterior and lateral  right abdominal wall.     This report was finalized on 2/19/2025 1:12 PM by Luis Bryson MD on  Workstation: [id number]       CT Abdomen Pelvis With Contrast [[id number]] Collected: 02/19/25 1252     Updated: 02/19/25 1315    Narrative:      CT ANGIOGRAM CHEST PULMONARY EMBOLISM-, CT ABDOMEN PELVIS W CONTRAST-     DATE OF EXAM: 2/19/2025 12:11 PM     INDICATION: right anterior lateral and inferior rib pain, shortness of  breath. Right-sided abdominal pain and  bruising     COMPARISON: CT abdomen pelvis 2/17/2025.     TECHNIQUE: Multiple contiguous axial images were acquired through the  chest, abdomen, and pelvis following the intravenous administration of  95 mL of Isovue-370. Reformatted coronal, sagittal, and 3D  reconstruction images were also reviewed. Radiation dose reduction  techniques were utilized, including automated exposure control and  exposure modulation based on body size.     FINDINGS:  CTA chest:  The study is mild limited by suboptimal contrast bolus timing. No  evidence of a filling defect in the main or proximal segmental pulmonary  arterial branches to suggest pulmonary embolism. Evaluation of the more  distal segmental and subsegmental pulmonary arterial branches is  limited. Dense contrast remains in the right axillary, subclavian, and  brachiocephalic veins and the SVC. The heart and great vessels of the  chest are normal in size. No evidence of calcified coronary artery  disease. Trace pericardial fluid. No pathologically enlarged  intrathoracic lymph nodes are identified. Partially imaged enlarged  heterogeneous multinodular left lobe of the thyroid gland with  calcification. Tiny hiatal hernia.     Enlarged heterogeneous small to moderate right pleural effusion, likely  hemothorax, with associated compressive atelectasis of the right lower  lobe. Patchy groundglass opacities in the dependent and basilar right  lower lobe could represent atelectasis and/or pulmonary contusion. The  left lung is clear. Trace retained secretions in the right interlobar  bronchus.     Displaced fracture of the posterolateral right eighth rib. Adjacent  heterogeneous family high attenuation collection in the posterolateral  right chest wall, measuring approximately 7.5 cm x 2 cm (axial series 1  image 97), probable hematoma. Additional enlarged predominately high  attenuation collection in the posterolateral right chest wall between  the right eighth and ninth ribs,  now measuring 8 cm x 2.5 cm (axial  series 1 image 114), also likely representing a hematoma. Mild fat  stranding in the right chest wall.     CT abdomen and pelvis:  Stable incompletely evaluated tiny subcentimeter low-density lesion in  the left lobe of the liver, probably benign cyst/hemangioma. The  gallbladder, spleen, pancreas, adrenal glands, and kidneys are  unremarkable. High density contents of the otherwise unremarkable  appearing urinary bladder could represent hematuria or contrast.     Mild colorectal stool. No bowel obstruction or significant bowel wall  thickening. The appendix is normal.     No free fluid in the abdomen or pelvis. No free intraperitoneal air. No  pathologically enlarged lymph nodes in the abdomen or pelvis.     Mild stranding in the subcutaneous fat of the anterolateral right  abdominal wall. Postoperative changes in the right inguinal region with  surgical clips in place. Mild to moderate bilateral hip and SI joint DJD  with bilateral SI joint bridging osteophyte formation. Chronic appearing  bilateral L5 pars defects with associated minimal grade 1  anterolisthesis of L5 on S1 and mild multilevel lumbar spondylosis. No  acute osseous abnormality or concerning osseous lesion       Impression:         1. The study is negative for pulmonary embolism in the main or proximal  segmental pulmonary arterial branches. Evaluation of the more distal  segmental and subsegmental pulmonary arterial branches is limited no  evidence of right heart strain.  2. Displaced fracture of the posterolateral right eighth rib with high  attenuation collections in the posterolateral lower right chest wall  that likely represent hematomas.  3. Enlarged heterogeneous small to moderate right pleural effusion,  likely hemothorax, with associated compressive atelectasis of the right  lower lobe.  4. Patchy groundglass opacities in the dependent and basilar right lower  lobe could represent atelectasis or  "pulmonary contusion.  5. Mild stranding in the subcutaneous fat of the anterior and lateral  right abdominal wall.     This report was finalized on 2/19/2025 1:12 PM by Luis Bryson MD on  Workstation: [id number]                 Pertinent Labs     Results from last 7 days   Lab Units 02/22/25 0512 02/20/25  0532 02/19/25  1107 02/17/25  0928   WBC 10*3/mm3 12.59* 12.09* 10.69 6.49   HEMOGLOBIN g/dL 11.3* 12.4* 14.2 15.3   PLATELETS 10*3/mm3 444 467* 525* 488*     Results from last 7 days   Lab Units 02/22/25 0512 02/21/25 2015 02/21/25  0844 02/20/25  0532 02/19/25  1107   SODIUM mmol/L 138  --  134* 134* 136   POTASSIUM mmol/L 4.3 4.1 3.6 3.8 3.4*   CHLORIDE mmol/L 103  --  99 100 99   CO2 mmol/L 25.7  --  26.0 26.0 24.2   BUN mg/dL 10  --  11 10 9   CREATININE mg/dL 0.53*  --  0.54* 0.60* 0.71*   GLUCOSE mg/dL 113*  --  161* 110* 154*   EGFR mL/min/1.73 118.4  --  117.7 114.0 108.4     Results from last 7 days   Lab Units 02/22/25 0512 02/21/25 0844 02/20/25  0532 02/19/25  1107   ALBUMIN g/dL 2.8* 3.0* 3.1* 3.1*   BILIRUBIN mg/dL 0.6 0.8 0.8 0.6   ALK PHOS U/L 66 67 60 70   AST (SGOT) U/L 19 20 29 36   ALT (SGPT) U/L 35 38 51* 69*     Results from last 7 days   Lab Units 02/22/25 0512 02/21/25 0844 02/20/25  0532 02/19/25  1242 02/19/25  1107   CALCIUM mg/dL 8.3* 8.5* 8.3*  --  8.9   ALBUMIN g/dL 2.8* 3.0* 3.1*  --  3.1*   MAGNESIUM mg/dL  --   --  2.3 2.2  --    PHOSPHORUS mg/dL  --   --  2.4*  --   --        Results from last 7 days   Lab Units 02/19/25  1242 02/19/25  1107   HSTROP T ng/L 8 9   PROBNP pg/mL  --  <36.0           Invalid input(s): \"LDLCALC\"  Results from last 7 days   Lab Units 02/20/25  1150   BODYFLDCX  No growth     Results from last 7 days   Lab Units 02/19/25  1419   COVID19  Not Detected       Test Results Pending at Discharge     Pending Results       Procedure [Order ID] Specimen - Date/Time    XR Chest 1 View [[id number]] Resulted: 02/22/25 1729     Updated: 02/22/25 1729    XR " Chest 1 View [[id number]]     XR Chest 1 View [[id number]]               Discharge Details        Discharge Medications        New Medications        Instructions Start Date   acetaminophen 500 MG tablet  Commonly known as: TYLENOL   1,000 mg, Oral, 3 Times Daily      benzonatate 100 MG capsule  Commonly known as: TESSALON   100 mg, Oral, 3 Times Daily PRN      diazePAM 2 MG tablet  Commonly known as: VALIUM   2 mg, Oral, Every 6 Hours PRN      gabapentin 300 MG capsule  Commonly known as: NEURONTIN   300 mg, Oral, 3 Times Daily      oxyCODONE 5 MG immediate release tablet  Commonly known as: ROXICODONE   5 mg, Oral, Every 4 Hours PRN             Continue These Medications        Instructions Start Date   Farxiga 10 MG tablet  Generic drug: dapagliflozin Propanediol   10 mg, Daily      omeprazole 40 MG capsule  Commonly known as: priLOSEC   40 mg, Nightly      Rybelsus 3 MG tablet  Generic drug: Semaglutide   3 mg, Daily      telmisartan 40 MG tablet  Commonly known as: MICARDIS   40 mg, Nightly      VITAMIN C PO   1 tablet, Nightly      VITAMIN D-3 PO   1 tablet, Nightly      ZINC PO   1 tablet, Nightly               Allergies   Allergen Reactions    Penicillins Hives and Rash       Discharge Disposition:  Home or Self Care      Discharge Diet:  Diet Order   Procedures    Diet: Regular/House; Fluid Consistency: Thin (IDDSI 0)       Discharge Activity:       CODE STATUS:    Code Status and Medical Interventions: CPR (Attempt to Resuscitate); Full   Ordered at: 02/19/25 1611     Code Status (Patient has no pulse and is not breathing):    CPR (Attempt to Resuscitate)     Medical Interventions (Patient has pulse or is breathing):    Full       Future Appointments   Date Time Provider Department Center   2/24/2025  8:45 AM Bigg Hassan MD [facility]      Follow-up Information       [facility] THORACIC SURGERY Follow up.    Specialty: Thoracic Surgery  Why: As needed  Contact information:  [address] [address] [phone]  Additional information:  PH: [phone]. Located in the Medical Offices [facility] at [address] [place] [address].             Bigg Hassan MD .    Specialty: Family Medicine  Contact information:  [address]  [facility]  [place] [address]  [phone]               Bobbi Huitron MD Follow up.    Specialty: Thoracic Surgery  Why: As needed  Contact information:  [address] [place] [place] [address]  [phone]                             Time Spent on Discharge:  Greater than 30 minutes      Valentin Villa MD  [facility]  02/22/25  17:47 EST    Electronically signed by Valentin Villa MD at 02/22/25 0131

## 2025-02-24 NOTE — TELEPHONE ENCOUNTER
Caller: Emily Barreto    Relationship to patient: Emergency Contact    Best call back number: [phone]    Patient is needing: STATES WOULD LIKE TO GET PRESCRIPTION FROM [facility] STILL BUT THEY ARE NEEDING ADDITIONAL INFORMATION BEFORE THEY CAN FILL MEDICATION ASKING IF THIS CAN BE DONE ASAP AND ASKING FOR A CALL BACK ONCE DONE SO SHE CAN GO  MEDICATION

## 2025-02-24 NOTE — TELEPHONE ENCOUNTER
Caller: Emily Barreto    Relationship to patient: Emergency Contact    Best call back number:     [phone]       Patient is needing: CAN CANCEL MED REFILL FOR [facility], HOSPITAL FILLED THE MED FOR THEM THIS MORNING.      oxyCODONE (ROXICODONE) 5 MG immediate release tablet

## 2025-02-25 NOTE — TELEPHONE ENCOUNTER
[facility] February 24 [facility]  Your PA request has been approved. Additional information will be provided in the approval communication. (Message [id number])  Effective Date: 2/23/2025  Authorization Expiration Date: 3/23/2025

## 2025-03-05 ENCOUNTER — READMISSION MANAGEMENT (OUTPATIENT)
Dept: CALL CENTER | Facility: HOSPITAL | Age: 55
End: 2025-03-05
Payer: COMMERCIAL

## 2025-03-05 NOTE — OUTREACH NOTE
Medical Week 2 Survey      Flowsheet Row Responses   [facility] patient discharged from? [place]   Does the patient have one of the following disease processes/diagnoses(primary or secondary)? Other   Week 2 attempt successful? Yes   Call start time 0955   Discharge diagnosis Hemothorax on right   Call end time 0957   Person spoke with today (if not patient) and relationship pt   Meds reviewed with patient/caregiver? Yes   Is the patient having any side effects they believe may be caused by any medication additions or changes? No   Does the patient have all medications ordered at discharge? Yes   Is the patient taking all medications as directed (includes completed medication regime)? Yes   Comments regarding appointments Has a hospital followup scheduled on 3/10   Does the patient have a primary care provider?  Yes   Does the patient have an appointment with their PCP within 7 days of discharge? Yes   Has the patient kept scheduled appointments due by today? Yes   Psychosocial issues? No   Did the patient receive a copy of their discharge instructions? Yes   Nursing interventions Reviewed instructions with patient   What is the patient's perception of their health status since discharge? Improving   Is the patient/caregiver able to teach back signs and symptoms related to disease process for when to call PCP? Yes   Is the patient/caregiver able to teach back signs and symptoms related to disease process for when to call 911? Yes   Is the patient/caregiver able to teach back the hierarchy of who to call/visit for symptoms/problems? PCP, Specialist, Home health nurse, Urgent Care, ED, 911 Yes   If the patient is a current smoker, are they able to teach back resources for cessation? Not a smoker   Week 2 Call Completed? Yes   Graduated Yes   Is the patient interested in additional calls from an ambulatory ? No   Would this patient benefit from a Referral to Amb Social Work? No   Graduated/Revoked  comments Patient is improving. Pain is better but still hurts some.   Call end time [id number]            Tee SAUNDERS - Registered Nurse

## 2025-03-10 ENCOUNTER — OFFICE VISIT (OUTPATIENT)
Age: 55
End: 2025-03-10
Payer: COMMERCIAL

## 2025-03-10 VITALS
SYSTOLIC BLOOD PRESSURE: 124 MMHG | RESPIRATION RATE: 14 BRPM | WEIGHT: 315 LBS | BODY MASS INDEX: 39.17 KG/M2 | DIASTOLIC BLOOD PRESSURE: 74 MMHG | OXYGEN SATURATION: 99 % | HEIGHT: 75 IN | TEMPERATURE: 98 F | HEART RATE: 102 BPM

## 2025-03-10 DIAGNOSIS — J94.2 HEMOTHORAX ON RIGHT: Primary | ICD-10-CM

## 2025-03-10 DIAGNOSIS — E11.9 TYPE 2 DIABETES MELLITUS WITHOUT COMPLICATION, UNSPECIFIED WHETHER LONG TERM INSULIN USE: ICD-10-CM

## 2025-03-10 DIAGNOSIS — R05.1 ACUTE COUGH: ICD-10-CM

## 2025-03-10 DIAGNOSIS — Z12.5 SCREENING PSA (PROSTATE SPECIFIC ANTIGEN): ICD-10-CM

## 2025-03-10 DIAGNOSIS — E78.2 MIXED HYPERLIPIDEMIA: ICD-10-CM

## 2025-03-10 DIAGNOSIS — E29.1 HYPOGONADISM MALE: ICD-10-CM

## 2025-03-10 PROCEDURE — 99214 OFFICE O/P EST MOD 30 MIN: CPT | Performed by: FAMILY MEDICINE

## 2025-03-10 RX ORDER — LISINOPRIL 10 MG/1
1 TABLET ORAL DAILY
COMMUNITY

## 2025-03-10 RX ORDER — BENZONATATE 100 MG/1
100 CAPSULE ORAL 3 TIMES DAILY PRN
Qty: 90 CAPSULE | Refills: 3 | Status: SHIPPED | OUTPATIENT
Start: 2025-03-10

## 2025-03-10 NOTE — PROGRESS NOTES
"Chief Complaint  Hemothorax follow up    Subjective        Guevara Barreto presents to [facility]  History of Present Illness    History of Present Illness  The patient presents for evaluation of rib fractures.    He reports a significant improvement in his condition, with pain levels currently at 4 on a scale of 10. He has been utilizing Tessalon Perles to manage his cough, which he finds highly effective. However, he ran out of the medication last weekend, leading to a resurgence of his cough and associated pain. He typically takes one dose in the morning upon waking and another before bedtime, which provides him with all-day relief. He has not yet returned to work due to his ongoing recovery. 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 He has been on medical leave for the past 3 weeks, during which he has been primarily resting and sleeping. He attempted to engage in some recreational activities with his son on Saturday but found the exertion to be excessive, necessitating a full day of rest on Sunday.    SOCIAL HISTORY  He works as a  for [employer].    MEDICATIONS  Tessalon Perles       Objective   Vital Signs:  /74 (BP Location: Left arm, Patient Position: Sitting, Cuff Size: Adult)   Pulse 102   Temp 98 °F (36.7 °C) (Temporal)   Resp 14   Ht 190.5 cm (75\")   Wt (!) 144 kg (318 lb)   SpO2 99%   BMI 39.75 kg/m²   Estimated body mass index is 39.75 kg/m² as calculated from the following:    Height as of this encounter: 190.5 cm (75\").    Weight as of this encounter: 144 kg (318 lb).    Class 2 Severe Obesity (BMI >=35 and <=39.9). Obesity-related health conditions include the following: hypertension, diabetes mellitus, and hepatic steatosis. Obesity is unchanged. BMI is is above average; BMI management plan is completed. We discussed portion " control, increasing exercise, and joining a fitness center or start home based exercise program.       Physical Exam  Constitutional:       General: He is not in acute distress.     Appearance: Normal appearance. He is morbidly obese. He is not ill-appearing, toxic-appearing or diaphoretic.       HENT:      Head: Normocephalic and atraumatic.      Right Ear: There is no impacted cerumen.      Left Ear: There is no impacted cerumen.      Nose: No congestion or rhinorrhea.      Mouth/Throat:      Pharynx: Oropharynx is clear. No oropharyngeal exudate or posterior oropharyngeal erythema.   Eyes:      General: No scleral icterus.        Right eye: No discharge.         Left eye: No discharge.      Extraocular Movements: Extraocular movements intact.      Conjunctiva/sclera: Conjunctivae normal.      Pupils: Pupils are equal, round, and reactive to light.   Cardiovascular:      Rate and Rhythm: Normal rate and regular rhythm.      Pulses: Normal pulses.      Heart sounds: Normal heart sounds.   Pulmonary:      Effort: Pulmonary effort is normal.      Breath sounds: Normal breath sounds.   Abdominal:      General: Abdomen is flat. Bowel sounds are normal.      Palpations: Abdomen is soft.   Musculoskeletal:         General: Normal range of motion.      Cervical back: Normal range of motion and neck supple.   Skin:     General: Skin is warm.   Neurological:      General: No focal deficit present.      Mental Status: He is oriented to person, place, and time. Mental status is at baseline. He is lethargic.   Psychiatric:         Mood and Affect: Mood normal.         Behavior: Behavior is uncooperative.         Thought Content: Thought content normal.         Judgment: Judgment normal.             Lungs were auscultated.       Result Review :    Results                Assessment and Plan   Diagnoses and all orders for this visit:    1. Hemothorax on right (Primary)    2. Type 2 diabetes mellitus without complication,  unspecified whether long term insulin use  -     Basic Metabolic Panel  -     Hemoglobin A1c  -     Microalbumin / Creatinine Urine Ratio - Urine, Clean Catch    3. Mixed hyperlipidemia  -     Hepatic Function Panel (6) ([facility])  -     Lipid Panel    4. Hypogonadism male  -     CBC & Differential  -     Testosterone    5. Screening PSA (prostate specific antigen)  -     PSA SCREENING    6. Acute cough  -     benzonatate (TESSALON) 100 MG capsule; Take 1 capsule by mouth 3 (Three) Times a Day As Needed for Cough.  Dispense: 90 capsule; Refill: 3        Assessment & Plan  1. Rib fractures.  His condition has shown significant improvement, with pain levels currently at 4 out of 10. The pain is primarily experienced during coughing episodes. He has found relief from Tessalon Perles, which he takes once in the morning and once at night. A prescription for 90 Tessalon Perles will be provided. A chest x-ray will be ordered to further evaluate the healing process of the rib fractures. He is advised to continue resting and avoid strenuous activities, including his work as a , until reassessed.    2. Mixed hyperlipidemia.  Blood work will be conducted to monitor cholesterol levels. He is advised to continue any current lipid-lowering therapy and maintain a balanced diet.    3. Diabetes mellitus.  An A1c test will be performed to monitor blood glucose control. He is advised to continue his current diabetes management plan, including medication, diet, and exercise.    4. Health maintenance.  Blood work will also include tests for testosterone, PSA, and liver function. 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          Follow Up   Return in about 3 weeks (around 3/31/2025).  Patient was given instructions and counseling regarding his condition or for health maintenance advice. Please see specific information pulled into the AVS if appropriate.         Patient or  patient representative verbalized consent for the use of Ambient Listening during the visit with  Bigg Hassan MD for chart documentation. 3/10/2025  08:36 EDT

## 2025-03-11 LAB
ALBUMIN SERPL-MCNC: 3.9 G/DL (ref 3.8–4.9)
ALP SERPL-CCNC: 100 IU/L (ref 44–121)
ALT SERPL-CCNC: 27 IU/L (ref 0–44)
AST SERPL-CCNC: 17 IU/L (ref 0–40)
BASOPHILS # BLD AUTO: 0.1 X10E3/UL (ref 0–0.2)
BASOPHILS NFR BLD AUTO: 1 %
BILIRUB DIRECT SERPL-MCNC: 0.17 MG/DL (ref 0–0.4)
BILIRUB SERPL-MCNC: 0.4 MG/DL (ref 0–1.2)
BUN SERPL-MCNC: 17 MG/DL (ref 6–24)
BUN/CREAT SERPL: 27 (ref 9–20)
CALCIUM SERPL-MCNC: 9.2 MG/DL (ref 8.7–10.2)
CHLORIDE SERPL-SCNC: 98 MMOL/L (ref 96–106)
CHOLEST SERPL-MCNC: 186 MG/DL (ref 100–199)
CO2 SERPL-SCNC: 22 MMOL/L (ref 20–29)
CREAT SERPL-MCNC: 0.64 MG/DL (ref 0.76–1.27)
EGFRCR SERPLBLD CKD-EPI 2021: 112 ML/MIN/1.73
EOSINOPHIL # BLD AUTO: 1.2 X10E3/UL (ref 0–0.4)
EOSINOPHIL NFR BLD AUTO: 14 %
ERYTHROCYTE [DISTWIDTH] IN BLOOD BY AUTOMATED COUNT: 14.5 % (ref 11.6–15.4)
GLUCOSE SERPL-MCNC: 129 MG/DL (ref 70–99)
HBA1C MFR BLD: 6.4 % (ref 4.8–5.6)
HCT VFR BLD AUTO: 43 % (ref 37.5–51)
HDLC SERPL-MCNC: 54 MG/DL
HGB BLD-MCNC: 13.5 G/DL (ref 13–17.7)
IMM GRANULOCYTES # BLD AUTO: 0 X10E3/UL (ref 0–0.1)
IMM GRANULOCYTES NFR BLD AUTO: 0 %
LDLC SERPL CALC-MCNC: 119 MG/DL (ref 0–99)
LYMPHOCYTES # BLD AUTO: 2.4 X10E3/UL (ref 0.7–3.1)
LYMPHOCYTES NFR BLD AUTO: 29 %
MCH RBC QN AUTO: 28.2 PG (ref 26.6–33)
MCHC RBC AUTO-ENTMCNC: 31.4 G/DL (ref 31.5–35.7)
MCV RBC AUTO: 90 FL (ref 79–97)
MONOCYTES # BLD AUTO: 0.8 X10E3/UL (ref 0.1–0.9)
MONOCYTES NFR BLD AUTO: 9 %
NEUTROPHILS # BLD AUTO: 4.1 X10E3/UL (ref 1.4–7)
NEUTROPHILS NFR BLD AUTO: 47 %
PLATELET # BLD AUTO: 454 X10E3/UL (ref 150–450)
POTASSIUM SERPL-SCNC: 4.7 MMOL/L (ref 3.5–5.2)
PSA SERPL-MCNC: 1.3 NG/ML (ref 0–4)
RBC # BLD AUTO: 4.79 X10E6/UL (ref 4.14–5.8)
SODIUM SERPL-SCNC: 138 MMOL/L (ref 134–144)
TESTOST SERPL-MCNC: 321 NG/DL (ref 264–916)
TRIGL SERPL-MCNC: 70 MG/DL (ref 0–149)
UNABLE TO VOID: NORMAL
VLDLC SERPL CALC-MCNC: 13 MG/DL (ref 5–40)
WBC # BLD AUTO: 8.6 X10E3/UL (ref 3.4–10.8)

## 2025-03-14 ENCOUNTER — HOSPITAL ENCOUNTER (OUTPATIENT)
Dept: GENERAL RADIOLOGY | Facility: HOSPITAL | Age: 55
Discharge: HOME | End: 2025-03-14
Admitting: FAMILY MEDICINE
Payer: COMMERCIAL

## 2025-03-14 DIAGNOSIS — J94.2 HEMOTHORAX ON RIGHT: ICD-10-CM

## 2025-03-14 PROCEDURE — 71046 X-RAY EXAM CHEST 2 VIEWS: CPT

## 2025-03-24 ENCOUNTER — OFFICE VISIT (OUTPATIENT)
Age: 55
End: 2025-03-24
Payer: COMMERCIAL

## 2025-03-24 VITALS
SYSTOLIC BLOOD PRESSURE: 132 MMHG | HEART RATE: 90 BPM | DIASTOLIC BLOOD PRESSURE: 74 MMHG | RESPIRATION RATE: 14 BRPM | OXYGEN SATURATION: 98 % | HEIGHT: 75 IN | WEIGHT: 315 LBS | BODY MASS INDEX: 39.17 KG/M2 | TEMPERATURE: 98 F

## 2025-03-24 DIAGNOSIS — E29.1 HYPOGONADISM MALE: ICD-10-CM

## 2025-03-24 DIAGNOSIS — E11.9 TYPE 2 DIABETES MELLITUS WITHOUT COMPLICATION, WITHOUT LONG-TERM CURRENT USE OF INSULIN: ICD-10-CM

## 2025-03-24 DIAGNOSIS — S22.49XG: Primary | ICD-10-CM

## 2025-03-24 DIAGNOSIS — S42.301G: Primary | ICD-10-CM

## 2025-03-24 DIAGNOSIS — J90 PLEURAL EFFUSION ON RIGHT: ICD-10-CM

## 2025-03-24 PROCEDURE — 99214 OFFICE O/P EST MOD 30 MIN: CPT | Performed by: FAMILY MEDICINE

## 2025-03-24 RX ORDER — TESTOSTERONE 1.62 MG/G
GEL TRANSDERMAL
Qty: 75 G | Refills: 5 | Status: SHIPPED | OUTPATIENT
Start: 2025-03-24

## 2025-03-24 NOTE — PROGRESS NOTES
"Chief Complaint  Hemothorax on right (Pt is still having a lot of pain. Will like to extend RTW date to April 14.)    Subjective        Guevara Barreto presents to [facility]  History of Present Illness    History of Present Illness  The patient presents for evaluation of right pleural effusion and erectile dysfunction.    He reports persistent soreness throughout the day, which intensifies during activities such as bending over to  objects or coughing. Despite these symptoms, he has been attempting to increase his physical activity, achieving 8000 steps yesterday and aiming for 10,000 today. He is not taking any narcotics but continues to use the prescribed antitussive medication. He expresses concern about a recent chest x-ray that indicated an increase in fluid in his right lung, conducted approximately 2 weeks ago. He also inquires about the possibility of resuming scuba diving, with a planned trip scheduled from 08/09/2025 to 08/16/2025.    He reports difficulty maintaining an erection during sexual intercourse and occasional inability to achieve orgasm. He is currently on telmisartan.    MEDICATIONS  Current: Telmisartan.       Objective   Vital Signs:  /74 (BP Location: Left arm, Patient Position: Sitting, Cuff Size: Adult)   Pulse 90   Temp 98 °F (36.7 °C) (Temporal)   Resp 14   Ht 190.5 cm (75\")   Wt (!) 144 kg (318 lb)   SpO2 98%   BMI 39.75 kg/m²   Estimated body mass index is 39.75 kg/m² as calculated from the following:    Height as of this encounter: 190.5 cm (75\").    Weight as of this encounter: 144 kg (318 lb).            Physical Exam  Constitutional:       General: He is not in acute distress.     Appearance: Normal appearance. He is not ill-appearing, toxic-appearing or diaphoretic.   HENT:      Head: Normocephalic and atraumatic.      Right Ear: There is no impacted cerumen.      Left Ear: There is no impacted cerumen.      Nose: No congestion or " rhinorrhea.      Mouth/Throat:      Pharynx: Oropharynx is clear. No oropharyngeal exudate or posterior oropharyngeal erythema.   Eyes:      General: No scleral icterus.        Right eye: No discharge.         Left eye: No discharge.      Extraocular Movements: Extraocular movements intact.      Conjunctiva/sclera: Conjunctivae normal.      Pupils: Pupils are equal, round, and reactive to light.   Cardiovascular:      Rate and Rhythm: Normal rate and regular rhythm.      Pulses: Normal pulses.      Heart sounds: Normal heart sounds.   Pulmonary:      Effort: Pulmonary effort is normal.      Breath sounds: Normal breath sounds.   Abdominal:      General: Abdomen is flat. Bowel sounds are normal.      Palpations: Abdomen is soft.   Musculoskeletal:         General: Normal range of motion.      Cervical back: Normal range of motion and neck supple.      Comments: Chest wall tenderness   Skin:     General: Skin is warm.   Neurological:      General: No focal deficit present.      Mental Status: He is alert and oriented to person, place, and time. Mental status is at baseline.   Psychiatric:         Mood and Affect: Mood normal.         Behavior: Behavior normal.         Thought Content: Thought content normal.         Judgment: Judgment normal.                  Result Review :    Results  Laboratory Studies  A1c is 6.4. Blood sugar is 129. Creatinine is normal. Hemoglobin is 13.5. Albumin is normal. PSA was 1.3. Testosterone was 321.    Imaging  Last chest x-ray showed small right pleural effusion with underlying perihilar and right.              Assessment and Plan   Diagnoses and all orders for this visit:    1. Multiple closed fractures of right upper extremity and ribs with delayed healing, subsequent encounter (Primary)  -     XR Chest 2 View; Future    2. Type 2 diabetes mellitus without complication, without long-term current use of insulin  -     Microalbumin / Creatinine Urine Ratio - Urine, Clean Catch    3.  Pleural effusion on right    4. Hypogonadism male  -     Testosterone 20.25 MG/ACT (1.62%) gel; 1 pump each shoulder daily  Dispense: 75 g; Refill: 5        Assessment & Plan  1. Right pleural effusion.  He reports persistent soreness, especially when bending down or coughing. A follow-up chest x-ray will be ordered to monitor the right pleural effusion noted in the last x-ray.    2. Erectile dysfunction.  His testosterone levels are at the lower end of the normal range (321 ng/dL). The potential side effects of testosterone therapy, including secondary polycythemia, were discussed. A prescription for topical testosterone was provided, with instructions to apply one pump on each shoulder daily. He was advised to avoid contact with others for an hour post-application to prevent transfer. Monthly monitoring of testosterone levels and complete blood count (CBC) will be conducted to ensure no adverse effects.    Follow-up  The patient will follow up on 04/14/2025.          Follow Up   Return in about 3 weeks (around 4/14/2025).  Patient was given instructions and counseling regarding his condition or for health maintenance advice. Please see specific information pulled into the AVS if appropriate.         Patient or patient representative verbalized consent for the use of Ambient Listening during the visit with  Bigg Hassan MD for chart documentation. 3/24/2025  10:47 EDT

## 2025-03-25 LAB
ALBUMIN/CREAT UR: 4 MG/G CREAT (ref 0–29)
CREAT UR-MCNC: 156 MG/DL
MICROALBUMIN UR-MCNC: 6.2 UG/ML

## 2025-04-11 ENCOUNTER — OFFICE VISIT (OUTPATIENT)
Age: 55
End: 2025-04-11
Payer: COMMERCIAL

## 2025-04-11 VITALS
HEART RATE: 82 BPM | RESPIRATION RATE: 18 BRPM | TEMPERATURE: 98.1 F | BODY MASS INDEX: 39.17 KG/M2 | SYSTOLIC BLOOD PRESSURE: 108 MMHG | DIASTOLIC BLOOD PRESSURE: 70 MMHG | WEIGHT: 315 LBS | OXYGEN SATURATION: 99 % | HEIGHT: 75 IN

## 2025-04-11 DIAGNOSIS — Z23 IMMUNIZATION DUE: ICD-10-CM

## 2025-04-11 DIAGNOSIS — Z23 NEED FOR VACCINATION: ICD-10-CM

## 2025-04-11 DIAGNOSIS — E29.1 HYPOGONADISM MALE: Primary | ICD-10-CM

## 2025-04-11 NOTE — PROGRESS NOTES
Chief Complaint  No chief complaint on file.    Jamaica Barreto presents to [facility]  History of Present Illness    History of Present Illness  The patient presents for a work release, hypogonadism, and health maintenance.    He is seeking a work release following a 7-week recovery period from a right 8th rib fracture sustained during a coughing episode at work. He reports significant improvement in his condition and plans to resume work on Monday, 04/14/2025. 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 He is currently asymptomatic. He also mentions a scuba diving incident where he experienced bruising, but he does not believe this contributed to his rib fracture. He recalls feeling unwell on the Thursday and Friday prior to the incident. Despite being advised to seek hospital care, he declined. Upon returning home, he underwent a CT scan at [facility], which did not reveal any abnormalities. He was cleared to return to work but experienced the rib fracture during a subsequent coughing fit. He is currently taking vitamin D 25 mg, magnesium 400 mg, zinc 50 mg, vitamin C 500 mg, fish oil, and other prescription medications. Over the past year and a half, he has resumed regular martial arts training and maintains an active lifestyle.    He has a history of hypogonadism and has been using a testosterone cream prescribed by Dr. Hassan, which he reports has significantly improved his symptoms.    He is due for his shingles and pneumonia vaccines.    SOCIAL HISTORY  He works for [employer].    MEDICATIONS  Current: testosterone cream, vitamin D, magnesium, zinc, vitamin C, fish oil    IMMUNIZATIONS  He has received his COVID-19 vaccine.       Objective   Vital Signs:  /70 (BP Location: Right arm, Patient Position: Sitting, Cuff Size: Large Adult)   Pulse 82   Temp 98.1 °F  "(36.7 °C) (Oral)   Resp 18   Ht 190.5 cm (75\")   Wt (!) 145 kg (320 lb)   SpO2 99%   BMI 40.00 kg/m²   Estimated body mass index is 40 kg/m² as calculated from the following:    Height as of this encounter: 190.5 cm (75\").    Weight as of this encounter: 145 kg (320 lb).            Review of Systems   Physical Exam  Constitutional:       General: He is not in acute distress.     Appearance: Normal appearance. He is not ill-appearing, toxic-appearing or diaphoretic.   HENT:      Head: Normocephalic and atraumatic.      Right Ear: There is no impacted cerumen.      Left Ear: There is no impacted cerumen.      Nose: No congestion or rhinorrhea.      Mouth/Throat:      Pharynx: Oropharynx is clear. No oropharyngeal exudate or posterior oropharyngeal erythema.   Eyes:      General: No scleral icterus.        Right eye: No discharge.         Left eye: No discharge.      Extraocular Movements: Extraocular movements intact.      Conjunctiva/sclera: Conjunctivae normal.      Pupils: Pupils are equal, round, and reactive to light.   Cardiovascular:      Rate and Rhythm: Normal rate and regular rhythm.      Pulses: Normal pulses.      Heart sounds: Normal heart sounds.   Pulmonary:      Effort: Pulmonary effort is normal.      Breath sounds: Normal breath sounds.   Abdominal:      General: Abdomen is flat. Bowel sounds are normal.      Palpations: Abdomen is soft.   Musculoskeletal:         General: Normal range of motion.      Cervical back: Normal range of motion and neck supple.      Comments: Chest wall tenderness   Skin:     General: Skin is warm.   Neurological:      General: No focal deficit present.      Mental Status: He is alert and oriented to person, place, and time. Mental status is at baseline.   Psychiatric:         Mood and Affect: Mood normal.         Behavior: Behavior normal.         Thought Content: Thought content normal.         Judgment: Judgment normal.        Result Review " :          Results  Imaging  CT scan showed no abnormalities.              Assessment and Plan   Diagnoses and all orders for this visit:    1. Hypogonadism male (Primary)  -     DEXA Bone Density Axial    2. Need for vaccination  -     Cancel: Pneumococcal Conjugate Vaccine 20-Valent All  -     Pneumococcal Polysaccharide Vaccine 23-Valent Greater Than or Equal To 3yo Subcutaneous / IM    3. Immunization due  -     Shingrix Vaccine        Assessment & Plan  1. Fracture of the right 8th rib.  He is deemed fit to resume work without any restrictions. A bone density test will be conducted to ensure optimal bone health. He is advised to continue his current regimen of vitamin D and calcium supplements. Weight-bearing exercises are recommended to maintain bone density.    2. Hypogonadism.  He reports significant improvement with the use of testosterone cream prescribed by Dr. Hassan. Continuation of testosterone therapy is recommended. A bone density test will be conducted to monitor for potential bone loss associated with hypogonadism.    3. Health maintenance.  He will receive the shingles vaccine and the pneumococcal 23 vaccine today. He is informed that some people may feel slightly unwell for a couple of days after the pneumococcal vaccine. His annual wellness visit is due and can be scheduled at his convenience.           Follow Up   No follow-ups on file.  Patient was given instructions and counseling regarding his condition or for health maintenance advice. Please see specific information pulled into the AVS if appropriate.         Patient or patient representative verbalized consent for the use of Ambient Listening during the visit with  PRADIP Collins for chart documentation. 4/19/2025  08:30 EDT

## 2025-04-28 ENCOUNTER — OFFICE VISIT (OUTPATIENT)
Age: 55
End: 2025-04-28
Payer: COMMERCIAL

## 2025-04-28 VITALS
WEIGHT: 315 LBS | RESPIRATION RATE: 17 BRPM | TEMPERATURE: 97 F | BODY MASS INDEX: 39.17 KG/M2 | DIASTOLIC BLOOD PRESSURE: 88 MMHG | SYSTOLIC BLOOD PRESSURE: 140 MMHG | HEART RATE: 90 BPM | HEIGHT: 75 IN | OXYGEN SATURATION: 99 %

## 2025-04-28 DIAGNOSIS — E11.9 TYPE 2 DIABETES MELLITUS WITHOUT COMPLICATION, WITHOUT LONG-TERM CURRENT USE OF INSULIN: Primary | ICD-10-CM

## 2025-04-28 DIAGNOSIS — Z00.00 WELLNESS EXAMINATION: ICD-10-CM

## 2025-04-28 DIAGNOSIS — E78.2 MIXED HYPERLIPIDEMIA: ICD-10-CM

## 2025-04-28 RX ORDER — ORAL SEMAGLUTIDE 14 MG/1
14 TABLET ORAL DAILY
Start: 2025-04-28

## 2025-04-28 NOTE — PROGRESS NOTES
"Chief Complaint  Annual Exam    Subjective        Guevara Barreto presents to [facility]  History of Present Illness    History of Present Illness  The patient is a 54-year-old male who presents for an annual wellness visit.    The chief complaint is the management of prediabetes. He has been managing his condition with medication and increased physical activity. He has never tried metformin. He has been on Rybelsus 14 mg since his initial diagnosis of diabetes 2 years ago. Recent lab results show an A1c of 6.4, up from 6.2 four months ago, and an elevated LDL of 119. He is considering incorporating elliptical training into his routine to facilitate weight loss and has discussed intermittent fasting as a weight management strategy. He has previously used a continuous glucose monitor but discontinued its use due to sleep disturbances caused by frequent alarms.    He is not currently on any cholesterol-lowering medications. He recalls a previous prescription for a cholesterol medication, which was discontinued due to joint pain, a known side effect of the drug.    No rectal bleeding is reported. He has had a colonoscopy in the past, but it has been a while since his last one. He sees a dentist every year. 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    He reports feeling the bone moving when he uses his right arm, which may be related to a previous rib fracture. He has allergies affecting his nose. He had a broken rib and was unable to do anything for a period. Upon returning to work, he was bumped off his bed and has been on call. He started his new schedule on Friday.      Objective   Vital Signs:  /88 (BP Location: Right arm, Patient Position: Sitting, Cuff Size: Adult)   Pulse 90   Temp 97 °F (36.1 °C) (Oral)   Resp 17   Ht 190.5 cm (75\")   Wt (!) 145 kg (319 lb 8 oz)   SpO2 99%   BMI 39.93 kg/m²   Estimated body mass index " "is 39.93 kg/m² as calculated from the following:    Height as of this encounter: 190.5 cm (75\").    Weight as of this encounter: 145 kg (319 lb 8 oz).             Physical Exam  Constitutional:       General: He is not in acute distress.     Appearance: Normal appearance. He is normal weight. He is not toxic-appearing.   HENT:      Head: Normocephalic and atraumatic.      Right Ear: Tympanic membrane, ear canal and external ear normal. There is no impacted cerumen. Tympanic membrane is not erythematous.      Left Ear: Ear canal and external ear normal. There is no impacted cerumen. Tympanic membrane is not erythematous.      Nose: Nose normal. No congestion or rhinorrhea.      Mouth/Throat:      Mouth: Mucous membranes are moist.      Tongue: No lesions. Tongue does not deviate from midline.      Pharynx: Oropharynx is clear. No pharyngeal swelling, oropharyngeal exudate, posterior oropharyngeal erythema or uvula swelling.      Tonsils: No tonsillar exudate or tonsillar abscesses.   Eyes:      General: Lids are normal. Vision grossly intact. Gaze aligned appropriately. No scleral icterus.     Extraocular Movements: Extraocular movements intact.      Conjunctiva/sclera: Conjunctivae normal.      Pupils: Pupils are equal, round, and reactive to light.   Neck:      Vascular: No carotid bruit.   Cardiovascular:      Rate and Rhythm: Normal rate and regular rhythm.      Pulses: Normal pulses.      Heart sounds: Normal heart sounds. No murmur heard.     No friction rub. No gallop.   Pulmonary:      Effort: Pulmonary effort is normal. No respiratory distress.      Breath sounds: Normal breath sounds. No stridor. No wheezing, rhonchi or rales.   Chest:      Chest wall: No tenderness.   Abdominal:      General: Abdomen is flat. There is no distension.      Palpations: Abdomen is soft. There is no mass.      Tenderness: There is no abdominal tenderness. There is no right CVA tenderness, left CVA tenderness, guarding or " rebound.      Hernia: No hernia is present.   Musculoskeletal:         General: Normal range of motion.      Cervical back: No rigidity or tenderness.      Right lower leg: No edema.      Left lower leg: No edema.   Lymphadenopathy:      Cervical: No cervical adenopathy.   Skin:     General: Skin is warm and dry.      Capillary Refill: Capillary refill takes less than 2 seconds.      Coloration: Skin is not jaundiced or pale.      Findings: No bruising, erythema or lesion.   Neurological:      General: No focal deficit present.      Mental Status: He is alert and oriented to person, place, and time. Mental status is at baseline.   Psychiatric:         Mood and Affect: Mood normal.         Behavior: Behavior normal. Behavior is cooperative.         Thought Content: Thought content normal.         Judgment: Judgment normal.                      Result Review :    Common labs          2/22/2025    05:12 3/10/2025    08:48 3/24/2025    15:38   Common Labs   Glucose 113  129     BUN 10  17     Creatinine 0.53  0.64     Sodium 138  138     Potassium 4.3  4.7     Chloride 103  98     Calcium 8.3  9.2     Albumin 2.8  3.9     Total Bilirubin 0.6  0.4     Alkaline Phosphatase 66  100     AST (SGOT) 19  17     ALT (SGPT) 35  27     WBC 12.59  8.6     Hemoglobin 11.3  13.5     Hematocrit 35.7  43.0     Platelets 444  454     Total Cholesterol  186     Triglycerides  70     HDL Cholesterol  54     LDL Cholesterol   119     Hemoglobin A1C  6.4     Microalbumin, Urine   6.2    PSA  1.3       CMP          2/21/2025    08:44 2/21/2025    20:15 2/22/2025    05:12 3/10/2025    08:48   CMP   Glucose 161   113  129    BUN 11   10  17    Creatinine 0.54   0.53  0.64    EGFR 117.7   118.4  112    Sodium 134   138  138    Potassium 3.6  4.1  4.3  4.7    Chloride 99   103  98    Calcium 8.5   8.3  9.2    Total Protein 5.9   6.1     Albumin 3.0   2.8  3.9    Globulin 2.9   3.3     Total Bilirubin 0.8   0.6  0.4    Alkaline Phosphatase 67    66  100    AST (SGOT) 20   19  17    ALT (SGPT) 38   35  27    Albumin/Globulin Ratio 1.0   0.8     BUN/Creatinine Ratio 20.4   18.9  27    Anion Gap 9.0   9.3       CBC          2/20/2025    05:32 2/22/2025    05:12 3/10/2025    08:48   CBC   WBC 12.09  12.59  8.6    RBC 4.39  4.13  4.79    Hemoglobin 12.4  11.3  13.5    Hematocrit 36.7  35.7  43.0    MCV 83.6  86.4  90    MCH 28.2  27.4  28.2    MCHC 33.8  31.7  31.4    RDW 13.4  13.6  14.5    Platelets 467  444  454      CBC w/diff          2/20/2025    05:32 2/22/2025    05:12 3/10/2025    08:48   CBC w/Diff   WBC 12.09  12.59  8.6    RBC 4.39  4.13  4.79    Hemoglobin 12.4  11.3  13.5    Hematocrit 36.7  35.7  43.0    MCV 83.6  86.4  90    MCH 28.2  27.4  28.2    MCHC 33.8  31.7  31.4    RDW 13.4  13.6  14.5    Platelets 467  444  454    Neutrophil Rel % 69.5   47    Immature Granulocyte Rel % 0.6      Lymphocyte Rel % 17.8   29    Monocyte Rel % 11.3   9    Eosinophil Rel % 0.6   14    Basophil Rel % 0.2   1      Lipid Panel          3/10/2025    08:48   Lipid Panel   Total Cholesterol 186    Triglycerides 70    HDL Cholesterol 54    VLDL Cholesterol 13    LDL Cholesterol  119      TSH          2/20/2025    05:32   TSH   TSH 0.940      Electrolytes          2/21/2025    08:44 2/21/2025    20:15 2/22/2025    05:12 3/10/2025    08:48   Electrolytes   Sodium 134   138  138    Potassium 3.6  4.1  4.3  4.7    Chloride 99   103  98    Calcium 8.5   8.3  9.2      Renal Profile          2/21/2025    08:44 2/22/2025    05:12 3/10/2025    08:48   Renal Profile   BUN 11  10  17    Creatinine 0.54  0.53  0.64      BMP          2/21/2025    08:44 2/21/2025    20:15 2/22/2025    05:12 3/10/2025    08:48   BMP   BUN 11   10  17    Creatinine 0.54   0.53  0.64    Sodium 134   138  138    Potassium 3.6  4.1  4.3  4.7    Chloride 99   103  98    CO2 26.0   25.7  22    Calcium 8.5   8.3  9.2      Most Recent A1C          3/10/2025    08:48   HGBA1C Most Recent   Hemoglobin A1C  6.4        A1C Last 3 Results          10/15/2024    08:29 3/10/2025    08:48   HGBA1C Last 3 Results   Hemoglobin A1C 6.20  6.4      Microalbumin          3/24/2025    15:38   Microalbumin   Microalbumin, Urine 6.2      HgB          2/20/2025    05:32 2/22/2025    05:12 3/10/2025    08:48   HGB   Hemoglobin 12.4  11.3  13.5      HCT          2/20/2025    05:32 2/22/2025    05:12 3/10/2025    08:48   HGT   Hematocrit 36.7  35.7  43.0      PSA          3/10/2025    08:48   PSA   PSA 1.3           Results  L             Patient Counseling:  --Nutrition: Stressed importance of moderation in sodium/caffeine intake, saturated fat and cholesterol, caloric balance, sufficient intake of fresh fruits, vegetables, fiber, calcium, iron, and 1 mg of folate supplement per day (for females capable of pregnancy).  --Exercise: Stressed the importance of regular exercise.   --Substance Abuse: Discussed cessation/primary prevention of tobacco, alcohol, or other drug use; driving or other dangerous activities under the influence; availability of treatment for abuse.    --Sexuality: Discussed sexually transmitted diseases, partner selection, use of condoms, avoidance of unintended pregnancy  and contraceptive alternatives.   --Injury prevention: Discussed safety belts, safety helmets, smoke detector, smoking near bedding or upholstery.   --Dental health: Discussed importance of regular tooth brushing, flossing, and dental visits.  --Immunizations reviewed.  --Discussed benefits of screening colonoscopy.  --After hours service discussed with patient       Assessment and Plan   Diagnoses and all orders for this visit:    1. Type 2 diabetes mellitus without complication, without long-term current use of insulin (Primary)    2. Mixed hyperlipidemia    3. Wellness examination        Assessment & Plan  1. Prediabetes.  - Glucose levels are within the prediabetic range, with an A1c of 6.4, up from 6.2 four months ago.  - Currently taking  Rybelsus 14 mg daily.  - Advised to follow up in one month to report on progress and discuss the potential need for a continuous glucose monitor. Advised to reduce carbohydrate intake and increase physical activity to 150 minutes per week.  - Metformin will be initiated at 500 mg once daily for one week, then increased to 500 mg twice daily. If diarrhea occurs, dietary evaluation and adjustment are recommended.    2. Hyperlipidemia.  - LDL cholesterol is elevated at 119, with a goal of <70.  - Not currently on any cholesterol medication due to previous side effects.  - Cholesterol management will be revisited at the next follow-up visit.    3. Health maintenance.  - Reports no rectal bleeding and has not had a colonoscopy in a while.  - Advised to schedule a colonoscopy. 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    4. Right arm pain.  - It takes 6 months for the bones to heal, but in someone as active as him, if he keeps moving those endplates, they may never heal.  - What he could be feeling could also be cartilage changes.  - Advised to monitor the pain and avoid excessive movement to facilitate healing.    Follow-up  The patient will follow up when he comes in for his shingles booster.            Follow Up   No follow-ups on file.  Patient was given instructions and counseling regarding his condition or for health maintenance advice. Please see specific information pulled into the AVS if appropriate.         Patient or patient representative verbalized consent for the use of Ambient Listening during the visit with  PRADIP Collins for chart documentation. 4/28/2025  09:16 EDT

## 2025-06-05 ENCOUNTER — OFFICE VISIT (OUTPATIENT)
Age: 55
End: 2025-06-05
Payer: COMMERCIAL

## 2025-06-05 VITALS
BODY MASS INDEX: 39.17 KG/M2 | WEIGHT: 315 LBS | SYSTOLIC BLOOD PRESSURE: 124 MMHG | OXYGEN SATURATION: 100 % | TEMPERATURE: 96.8 F | DIASTOLIC BLOOD PRESSURE: 76 MMHG | HEIGHT: 75 IN | HEART RATE: 85 BPM | RESPIRATION RATE: 17 BRPM

## 2025-06-05 DIAGNOSIS — E11.9 TYPE 2 DIABETES MELLITUS WITHOUT COMPLICATION, WITHOUT LONG-TERM CURRENT USE OF INSULIN: ICD-10-CM

## 2025-06-05 DIAGNOSIS — L02.91 ABSCESS: Primary | ICD-10-CM

## 2025-06-05 RX ORDER — DOXYCYCLINE 100 MG/1
100 CAPSULE ORAL 2 TIMES DAILY
Qty: 20 CAPSULE | Refills: 0 | Status: SHIPPED | OUTPATIENT
Start: 2025-06-05 | End: 2025-06-15

## 2025-06-06 ENCOUNTER — TELEPHONE (OUTPATIENT)
Age: 55
End: 2025-06-06

## 2025-06-06 NOTE — TELEPHONE ENCOUNTER
PER JACKY AT THE HUB, MR DARIA WAGNER PREFERRED A LATER TIME WITH DR SRINIVASAN. I SPOKE WITH HIM AND INFORMED HIM HE HAD AN APPOINTMENT ON 6/11 AT 9:30. DR SRINIVASAN HAD AN OPENING AT 8:45 ON 6/9 ALSO. HE SAID THESE OPENINGS WERE UNACCEPTABLE DUE TO HIS WORK SCHEDULE, I INFORMED HIM I WOULD PUT HIM ON A BRIEF HOLD TO TRY TO GET A MA TO FIT HIM INTO THE SCHEDULE. HE INFORMED ME HE WOULD GO TO THE HOSPITAL INSTEAD AND HUNG UP. MRN [id number]

## 2025-06-14 NOTE — PROGRESS NOTES
"Chief Complaint  Abscess (Pt states he has a boil on his bottom)    Subjective        Guevara Barreto presents to [facility]  Abscess      History of Present Illness  The patient presents for evaluation of a painful boil on his buttock.    He reports the development of the boil two days ago, which he noticed upon waking up. The boil is located near his tailbone and has been causing significant discomfort. He has a history of small boils but has not previously experienced a pilonidal cyst. He notes that the boil was not red or hairy yesterday, but it has since become inflamed. He is uncertain about his past use of Augmentin or doxycycline.    He reports that his blood sugar levels are within normal range, with his last A1c test conducted 2 months ago.    He is currently taking calcium supplements, vitamin D3 at a dosage of 1000 units, magnesium, zinc at a dosage of 50 mg, vitamin C at a dosage of 500 mg, and fish oil.       Objective   Vital Signs:  /76 (BP Location: Left arm, Patient Position: Sitting, Cuff Size: Adult)   Pulse 85   Temp 96.8 °F (36 °C) (Oral)   Resp 17   Ht 190.5 cm (75\")   Wt (!) 150 kg (331 lb)   SpO2 100%   BMI 41.37 kg/m²   Estimated body mass index is 41.37 kg/m² as calculated from the following:    Height as of this encounter: 190.5 cm (75\").    Weight as of this encounter: 150 kg (331 lb).            Review of Systems   Physical Exam  Skin:            Comments: likely bacterial in etiology, with a size of approximately 4-5 cm in diameter (the size of a golf ball). The abscess is tender, fluctuant, and erythematous, suggestive of an active infection. No signs of systemic infection (no fever, chills or other systemic symptoms noted).        Result Review :          Results                Assessment and Plan   Diagnoses and all orders for this visit:    1. Abscess (Primary)    Other orders  -     doxycycline (VIBRAMYCIN) 100 MG capsule; Take 1 capsule " by mouth 2 (Two) Times a Day for 10 days.  Dispense: 20 capsule; Refill: 0        Assessment & Plan  1. Abscess   - The boil is deep and lacks a head  - It is likely due to bacterial infection.  - He is advised to clean any pimple, bug bite, or sore with alcohol prep to prevent future boils. He should also soak in Epsom salts to draw abscess to the surface.  - A prescription for doxycycline has been provided. He will follow up with Dr. Hassan on Monday to assess his response to the antibiotic treatment. If the boil does not improve, it may requite I&D.    2. Diabetes mellitus.  - He reports that his blood sugar levels are doing okay.  - His last A1c was checked 2 months ago.  - He is advised to continue monitoring his blood sugar levels closely.  - No changes in diabetes management were discussed.    3. Vitamin supplementation.  - He is currently taking calcium, vitamin D3 (1000 units), magnesium, zinc (50 mg), vitamin C (500 mg), and fish oil.  - He should take the vitamin D and K2 with a fat-soluble meal.  - No other changes in supplementation were discussed.           Follow Up   No follow-ups on file.  Patient was given instructions and counseling regarding his condition or for health maintenance advice. Please see specific information pulled into the AVS if appropriate.         Patient or patient representative verbalized consent for the use of Ambient Listening during the visit with  PRADIP Collins for chart documentation. 6/14/2025  12:40 EDT

## 2025-07-03 ENCOUNTER — CLINICAL SUPPORT (OUTPATIENT)
Age: 55
End: 2025-07-03
Payer: COMMERCIAL

## 2025-07-03 DIAGNOSIS — Z23 IMMUNIZATION DUE: Primary | ICD-10-CM

## 2025-07-03 PROCEDURE — 90750 HZV VACC RECOMBINANT IM: CPT

## 2025-07-03 PROCEDURE — 90471 IMMUNIZATION ADMIN: CPT

## 2025-07-03 NOTE — PROGRESS NOTES
After obtaining consent, and per orders of Dr. Hassan, injection of Shingrix given in RT deltoid by Lisette Lee MA. Patient instructed to remain in clinic for 20 minutes afterwards, and to report any adverse reaction to me immediately.

## 2025-07-24 ENCOUNTER — TELEPHONE (OUTPATIENT)
Age: 55
End: 2025-07-24
Payer: COMMERCIAL

## 2025-07-24 NOTE — TELEPHONE ENCOUNTER
Called and s/w patient about bone density scan from 4/11/25. Patient states this was placed for a broken rib and he does not wish to complete this.

## 2025-08-18 RX ORDER — ORAL SEMAGLUTIDE 14 MG/1
1 TABLET ORAL DAILY
Qty: 90 TABLET | Refills: 3 | Status: SHIPPED | OUTPATIENT
Start: 2025-08-18

## (undated) DEVICE — SUT ETHIB 0/0 MO6 I8IN CX45D

## (undated) DEVICE — LOU MINOR PROCEDURE: Brand: MEDLINE INDUSTRIES, INC.

## (undated) DEVICE — ELECTRD BLD EZ CLN MOD XLNG 2.75IN

## (undated) DEVICE — PENCL E/S ULTRAVAC TELESCP NOSE HOLSTR 10FT

## (undated) DEVICE — ANTIBACTERIAL UNDYED BRAIDED (POLYGLACTIN 910), SYNTHETIC ABSORBABLE SUTURE: Brand: COATED VICRYL

## (undated) DEVICE — SKIN PREP TRAY W/CHG: Brand: MEDLINE INDUSTRIES, INC.

## (undated) DEVICE — CULT AER/ANAEROB FASTIDIOUS BACT

## (undated) DEVICE — PAD,ABDOMINAL,8"X10",ST,LF: Brand: MEDLINE

## (undated) DEVICE — GOWN ,SIRUS,NONREINFORCED SMALL: Brand: MEDLINE

## (undated) DEVICE — JACKSON-PRATT 100CC BULB RESERVOIR: Brand: CARDINAL HEALTH

## (undated) DEVICE — DRSNG WND BORDR/ADHS NONADHR/GZ LF 4X4IN STRL

## (undated) DEVICE — PENROSE DRAIN 18" X 5/8": Brand: CARDINAL HEALTH

## (undated) DEVICE — SUT VIC 5/0 PS2 18IN J495H

## (undated) DEVICE — GLV SURG PREMIERPRO ORTHO LTX PF SZ7.5 BRN

## (undated) DEVICE — SUT VIC 3/0 TIES 18IN J110T

## (undated) DEVICE — PATIENT RETURN ELECTRODE, SINGLE-USE, CONTACT QUALITY MONITORING, ADULT, WITH 9FT CORD, FOR PATIENTS WEIGING OVER 33LBS. (15KG): Brand: MEGADYNE

## (undated) DEVICE — SUT ETHLN 3/0 PS1 18IN 1663H

## (undated) DEVICE — GLV SURG BIOGEL LTX PF 6

## (undated) DEVICE — TRAP FLD MINIVAC MEGADYNE 100ML

## (undated) DEVICE — DRSNG WND GZ PAD BORDERED 4X8IN STRL

## (undated) DEVICE — Device